# Patient Record
Sex: FEMALE | Race: WHITE | Employment: FULL TIME | URBAN - METROPOLITAN AREA
[De-identification: names, ages, dates, MRNs, and addresses within clinical notes are randomized per-mention and may not be internally consistent; named-entity substitution may affect disease eponyms.]

---

## 2017-01-09 ENCOUNTER — OFFICE VISIT (OUTPATIENT)
Dept: FAMILY MEDICINE CLINIC | Age: 52
End: 2017-01-09

## 2017-01-09 VITALS
HEIGHT: 60 IN | WEIGHT: 137.6 LBS | OXYGEN SATURATION: 96 % | SYSTOLIC BLOOD PRESSURE: 120 MMHG | BODY MASS INDEX: 27.01 KG/M2 | DIASTOLIC BLOOD PRESSURE: 80 MMHG | TEMPERATURE: 97.7 F | HEART RATE: 74 BPM

## 2017-01-09 DIAGNOSIS — J45.21 MILD INTERMITTENT ASTHMA WITH ACUTE EXACERBATION: Primary | ICD-10-CM

## 2017-01-09 PROCEDURE — 99213 OFFICE O/P EST LOW 20 MIN: CPT | Performed by: NURSE PRACTITIONER

## 2017-01-09 PROCEDURE — 94150 VITAL CAPACITY TEST: CPT | Performed by: NURSE PRACTITIONER

## 2017-01-09 PROCEDURE — 94640 AIRWAY INHALATION TREATMENT: CPT | Performed by: NURSE PRACTITIONER

## 2017-01-09 RX ORDER — ALBUTEROL SULFATE 90 UG/1
2 AEROSOL, METERED RESPIRATORY (INHALATION) EVERY 6 HOURS PRN
Qty: 3 INHALER | Refills: 1 | Status: SHIPPED | OUTPATIENT
Start: 2017-01-09 | End: 2019-09-09

## 2017-01-09 RX ORDER — MONTELUKAST SODIUM 10 MG/1
10 TABLET ORAL DAILY
Qty: 90 TABLET | Refills: 1 | Status: SHIPPED | OUTPATIENT
Start: 2017-01-09 | End: 2017-06-20 | Stop reason: SDUPTHER

## 2017-01-09 RX ORDER — PREDNISONE 10 MG/1
TABLET ORAL
Qty: 20 TABLET | Refills: 0 | Status: SHIPPED | OUTPATIENT
Start: 2017-01-09 | End: 2017-08-15 | Stop reason: ALTCHOICE

## 2017-01-09 RX ORDER — ALBUTEROL SULFATE 2.5 MG/3ML
2.5 SOLUTION RESPIRATORY (INHALATION) ONCE
Status: COMPLETED | OUTPATIENT
Start: 2017-01-09 | End: 2017-01-09

## 2017-01-09 RX ADMIN — ALBUTEROL SULFATE 2.5 MG: 2.5 SOLUTION RESPIRATORY (INHALATION) at 16:16

## 2017-01-09 ASSESSMENT — ENCOUNTER SYMPTOMS
WHEEZING: 0
HEARTBURN: 0
CHEST TIGHTNESS: 1
COUGH: 1
EYES NEGATIVE: 1
GASTROINTESTINAL NEGATIVE: 1
ALLERGIC/IMMUNOLOGIC NEGATIVE: 1
HEMOPTYSIS: 0
SORE THROAT: 0
SHORTNESS OF BREATH: 0
RHINORRHEA: 0

## 2017-04-20 RX ORDER — PAROXETINE 10 MG/1
10 TABLET, FILM COATED ORAL EVERY MORNING
Qty: 90 TABLET | Refills: 0 | Status: SHIPPED | OUTPATIENT
Start: 2017-04-20 | End: 2017-07-19 | Stop reason: SDUPTHER

## 2017-05-08 DIAGNOSIS — E03.9 ACQUIRED HYPOTHYROIDISM: ICD-10-CM

## 2017-05-08 RX ORDER — LEVOTHYROXINE SODIUM 0.1 MG/1
100 TABLET ORAL DAILY
Qty: 90 TABLET | Refills: 0 | Status: SHIPPED | OUTPATIENT
Start: 2017-05-08 | End: 2017-08-07 | Stop reason: SDUPTHER

## 2017-06-20 DIAGNOSIS — J30.1 SEASONAL ALLERGIC RHINITIS DUE TO POLLEN: Primary | ICD-10-CM

## 2017-06-20 RX ORDER — MONTELUKAST SODIUM 10 MG/1
10 TABLET ORAL NIGHTLY
Qty: 90 TABLET | Refills: 1 | Status: SHIPPED | OUTPATIENT
Start: 2017-06-20 | End: 2017-12-18 | Stop reason: SDUPTHER

## 2017-07-19 RX ORDER — PAROXETINE 10 MG/1
10 TABLET, FILM COATED ORAL EVERY MORNING
Qty: 90 TABLET | Refills: 0 | Status: SHIPPED | OUTPATIENT
Start: 2017-07-19 | End: 2017-10-17 | Stop reason: SDUPTHER

## 2017-07-25 ENCOUNTER — TELEPHONE (OUTPATIENT)
Dept: FAMILY MEDICINE CLINIC | Age: 52
End: 2017-07-25

## 2017-07-25 DIAGNOSIS — Z00.00 ROUTINE GENERAL MEDICAL EXAMINATION AT A HEALTH CARE FACILITY: Primary | ICD-10-CM

## 2017-07-25 DIAGNOSIS — Z11.59 NEED FOR HEPATITIS C SCREENING TEST: ICD-10-CM

## 2017-07-25 DIAGNOSIS — Z11.4 SCREENING FOR HIV (HUMAN IMMUNODEFICIENCY VIRUS): ICD-10-CM

## 2017-08-07 DIAGNOSIS — E03.9 ACQUIRED HYPOTHYROIDISM: ICD-10-CM

## 2017-08-07 RX ORDER — LEVOTHYROXINE SODIUM 0.1 MG/1
100 TABLET ORAL DAILY
Qty: 90 TABLET | Refills: 1 | Status: SHIPPED | OUTPATIENT
Start: 2017-08-07 | End: 2018-01-22 | Stop reason: SDUPTHER

## 2017-08-15 ENCOUNTER — OFFICE VISIT (OUTPATIENT)
Dept: FAMILY MEDICINE CLINIC | Age: 52
End: 2017-08-15

## 2017-08-15 VITALS
HEART RATE: 64 BPM | HEIGHT: 60 IN | BODY MASS INDEX: 27.84 KG/M2 | SYSTOLIC BLOOD PRESSURE: 100 MMHG | OXYGEN SATURATION: 96 % | DIASTOLIC BLOOD PRESSURE: 60 MMHG | WEIGHT: 141.8 LBS | TEMPERATURE: 98 F

## 2017-08-15 DIAGNOSIS — Z00.00 ROUTINE GENERAL MEDICAL EXAMINATION AT A HEALTH CARE FACILITY: Primary | ICD-10-CM

## 2017-08-15 LAB
BILIRUBIN, POC: NORMAL
BLOOD URINE, POC: NORMAL
CLARITY, POC: CLEAR
COLOR, POC: YELLOW
GLUCOSE URINE, POC: NORMAL
KETONES, POC: NORMAL
LEUKOCYTE EST, POC: NORMAL
NITRITE, POC: NORMAL
PH, POC: 5
PROTEIN, POC: NORMAL
SPECIFIC GRAVITY, POC: 1.02
UROBILINOGEN, POC: 0.2

## 2017-08-15 PROCEDURE — 99396 PREV VISIT EST AGE 40-64: CPT | Performed by: NURSE PRACTITIONER

## 2017-08-15 PROCEDURE — 81002 URINALYSIS NONAUTO W/O SCOPE: CPT | Performed by: NURSE PRACTITIONER

## 2017-10-17 RX ORDER — PAROXETINE 10 MG/1
10 TABLET, FILM COATED ORAL EVERY MORNING
Qty: 90 TABLET | Refills: 1 | Status: SHIPPED | OUTPATIENT
Start: 2017-10-17 | End: 2018-10-26 | Stop reason: SDUPTHER

## 2017-12-07 ENCOUNTER — TELEPHONE (OUTPATIENT)
Dept: OTHER | Facility: CLINIC | Age: 52
End: 2017-12-07

## 2017-12-18 DIAGNOSIS — J30.1 SEASONAL ALLERGIC RHINITIS DUE TO POLLEN: ICD-10-CM

## 2017-12-18 RX ORDER — MONTELUKAST SODIUM 10 MG/1
10 TABLET ORAL NIGHTLY
Qty: 90 TABLET | Refills: 1 | Status: SHIPPED | OUTPATIENT
Start: 2017-12-18 | End: 2019-09-09

## 2018-01-22 DIAGNOSIS — E03.9 ACQUIRED HYPOTHYROIDISM: ICD-10-CM

## 2018-01-22 RX ORDER — LEVOTHYROXINE SODIUM 0.1 MG/1
100 TABLET ORAL DAILY
Qty: 90 TABLET | Refills: 1 | Status: SHIPPED | OUTPATIENT
Start: 2018-01-22 | End: 2018-07-21 | Stop reason: SDUPTHER

## 2018-07-21 DIAGNOSIS — E03.9 ACQUIRED HYPOTHYROIDISM: ICD-10-CM

## 2018-07-21 RX ORDER — LEVOTHYROXINE SODIUM 0.1 MG/1
TABLET ORAL
Qty: 90 TABLET | Refills: 0 | Status: SHIPPED | OUTPATIENT
Start: 2018-07-21 | End: 2019-02-06 | Stop reason: SDUPTHER

## 2018-07-26 ENCOUNTER — OFFICE VISIT (OUTPATIENT)
Dept: FAMILY MEDICINE CLINIC | Age: 53
End: 2018-07-26

## 2018-07-26 ENCOUNTER — HOSPITAL ENCOUNTER (OUTPATIENT)
Age: 53
Discharge: HOME | End: 2018-07-26
Payer: COMMERCIAL

## 2018-07-26 ENCOUNTER — HOSPITAL ENCOUNTER (OUTPATIENT)
Dept: GENERAL RADIOLOGY | Age: 53
Discharge: HOME | End: 2018-07-26
Payer: COMMERCIAL

## 2018-07-26 VITALS
WEIGHT: 143.6 LBS | BODY MASS INDEX: 28.04 KG/M2 | DIASTOLIC BLOOD PRESSURE: 86 MMHG | SYSTOLIC BLOOD PRESSURE: 124 MMHG | TEMPERATURE: 98.3 F

## 2018-07-26 DIAGNOSIS — E03.9 ACQUIRED HYPOTHYROIDISM: ICD-10-CM

## 2018-07-26 DIAGNOSIS — G89.29 CHRONIC RIGHT-SIDED LOW BACK PAIN WITHOUT SCIATICA: Primary | ICD-10-CM

## 2018-07-26 DIAGNOSIS — S76.019A HIP STRAIN, UNSPECIFIED LATERALITY, INITIAL ENCOUNTER: ICD-10-CM

## 2018-07-26 DIAGNOSIS — G89.29 CHRONIC RIGHT-SIDED LOW BACK PAIN WITHOUT SCIATICA: ICD-10-CM

## 2018-07-26 DIAGNOSIS — N95.1 MENOPAUSE SYNDROME: ICD-10-CM

## 2018-07-26 DIAGNOSIS — J30.1 CHRONIC SEASONAL ALLERGIC RHINITIS DUE TO POLLEN: ICD-10-CM

## 2018-07-26 DIAGNOSIS — M54.50 CHRONIC RIGHT-SIDED LOW BACK PAIN WITHOUT SCIATICA: ICD-10-CM

## 2018-07-26 DIAGNOSIS — M54.50 CHRONIC RIGHT-SIDED LOW BACK PAIN WITHOUT SCIATICA: Primary | ICD-10-CM

## 2018-07-26 LAB
T4 FREE: 1.4 NG/DL (ref 0.9–1.8)
TSH SERPL DL<=0.05 MIU/L-ACNC: 2.4 UIU/ML (ref 0.27–4.2)

## 2018-07-26 PROCEDURE — 72100 X-RAY EXAM L-S SPINE 2/3 VWS: CPT

## 2018-07-26 PROCEDURE — 99214 OFFICE O/P EST MOD 30 MIN: CPT | Performed by: FAMILY MEDICINE

## 2018-07-26 RX ORDER — CIPROFLOXACIN 500 MG/1
TABLET, FILM COATED ORAL
COMMUNITY
Start: 2018-07-23 | End: 2018-12-27

## 2018-07-27 ASSESSMENT — PATIENT HEALTH QUESTIONNAIRE - PHQ9
SUM OF ALL RESPONSES TO PHQ QUESTIONS 1-9: 2
SUM OF ALL RESPONSES TO PHQ9 QUESTIONS 1 & 2: 2
2. FEELING DOWN, DEPRESSED OR HOPELESS: 1
1. LITTLE INTEREST OR PLEASURE IN DOING THINGS: 1

## 2018-07-27 ASSESSMENT — ENCOUNTER SYMPTOMS
GASTROINTESTINAL NEGATIVE: 1
RESPIRATORY NEGATIVE: 1
EYES NEGATIVE: 1

## 2018-07-27 NOTE — PROGRESS NOTES
7/27/18    Linda Hays  1965      Chief Complaint   Patient presents with    Back Pain     lower back pain radiating into hip and leg, no injury, consant, dull ache, worse when working out     Low Back Pain: Patient complains of chronic low back pain. This is evaluated as a personal injury. The patient first noted symptoms severalmonths ago. It was related to no known injury. The pain is rated mild, moderate, and is located at the right lumbar area, right sacroiliac area or radiating to right leg(s). The pain is described as aching and occurs intermittently. The symptoms denies been progressive. Symptoms are exacerbated by sitting, standing, twisting and lying down. Factors which relieve the pain include change in body position and NSAIDs (otc). Other associated symptoms include none. Previous history of symptoms: never. Treatment efforts have included OTC NSAIDS, with and without relief. Hypothyroidism: Patient presents for evaluation of thyroid function. Symptoms consist of fatigue. Symptoms have present for several years. The symptoms are mild. The problem has been controlled. Previous thyroid studies include TSH. The hypothyroidism is due to hypothyroidism. Allergic Rhinitis: Linda Hays is here for evaluation of possible allergic rhinitis. Patient's symptoms include clear rhinorrhea, nasal congestion and postnasal drip. These symptoms are perennial with seasonal exacerbation. Current triggers include exposure to pollens. The patient has been suffering from these symptoms for approximately 12 years. The patient has tried over the counter medications with good relief of symptoms. Immunotherapy has never been tried. The patient has never had nasal polyps. The patient has no history of asthma. The patient does not suffer from frequent sinopulmonary infections. The patient has not had sinus surgery in the past. The patient has no history of eczema.     /86   Temp 98.3 °F (36.8 °C) (Oral)   Wt 143 lb 9.6 oz (65.1 kg)   BMI 28.04 kg/m²       Immunization History   Administered Date(s) Administered    Influenza, Quadv, 3 yrs and older, IM, Preservative Free 10/13/2016    Tdap (Boostrix, Adacel) 09/12/2014       Allergies   Allergen Reactions    Wasp Venom Other (See Comments)     Outpatient Prescriptions Marked as Taking for the 7/26/18 encounter (Office Visit) with Nanette Rosa MD   Medication Sig Dispense Refill    ciprofloxacin (CIPRO) 500 MG tablet       levothyroxine (SYNTHROID) 100 MCG tablet TAKE 1 TABLET DAILY 90 tablet 0    montelukast (SINGULAIR) 10 MG tablet Take 1 tablet by mouth nightly 90 tablet 1    PARoxetine (PAXIL) 10 MG tablet Take 1 tablet by mouth every morning 90 tablet 1    budesonide-formoterol (SYMBICORT) 160-4.5 MCG/ACT AERO Inhale 2 puffs into the lungs 2 times daily. 1 Inhaler 3       Past Medical History:   Diagnosis Date    Allergic rhinitis     Asthma     Hyperlipidemia     Hypothyroidism      No past surgical history on file. No family history on file. Social History     Social History    Marital status:      Spouse name: N/A    Number of children: N/A    Years of education: N/A     Occupational History    Not on file. Social History Main Topics    Smoking status: Passive Smoke Exposure - Never Smoker     Types: Cigarettes    Smokeless tobacco: Never Used    Alcohol use 0.0 oz/week    Drug use: No    Sexual activity: Yes     Partners: Male     Other Topics Concern    Not on file     Social History Narrative    No narrative on file         Any recent diagnostic tests, hospital reports, office notes or consultation letters were reviewed prior to and during the visit. Review of Systems   Constitutional: Negative. HENT: Negative. Eyes: Negative. Respiratory: Negative. Cardiovascular: Negative. Gastrointestinal: Negative. Genitourinary: Negative. Musculoskeletal: Negative.     Psychiatric/Behavioral: Negative. Physical Exam   Constitutional: She is oriented to person, place, and time. She appears well-developed and well-nourished. No distress. HENT:   Head: Normocephalic and atraumatic. Right Ear: Hearing, tympanic membrane, external ear and ear canal normal.   Left Ear: Hearing, tympanic membrane, external ear and ear canal normal.   Nose: Nose normal. No mucosal edema or rhinorrhea. Mouth/Throat: Uvula is midline, oropharynx is clear and moist and mucous membranes are normal.   Eyes: Conjunctivae and EOM are normal. Pupils are equal, round, and reactive to light. Right eye exhibits no discharge. Left eye exhibits no discharge. No scleral icterus. Neck: Trachea normal and normal range of motion. Neck supple. No JVD present. No tracheal deviation present. No thyromegaly present. Cardiovascular: Normal rate, regular rhythm, normal heart sounds and intact distal pulses. Exam reveals no gallop and no friction rub. No murmur heard. Pulses:       Dorsalis pedis pulses are 2+ on the right side, and 2+ on the left side. Posterior tibial pulses are 2+ on the right side, and 2+ on the left side. Pulmonary/Chest: Effort normal and breath sounds normal. No stridor. No respiratory distress. She has no decreased breath sounds. She has no wheezes. She has no rhonchi. She has no rales. She exhibits no tenderness. Abdominal: Soft. Bowel sounds are normal. She exhibits no distension and no mass. There is no hepatosplenomegaly. There is no tenderness. There is no rebound and no guarding. No hernia. Musculoskeletal:        Right hip: She exhibits decreased range of motion, decreased strength and tenderness. She exhibits no bony tenderness. Left hip: She exhibits normal range of motion, normal strength, no tenderness and no bony tenderness. Lumbar back: She exhibits decreased range of motion, tenderness and bony tenderness.  She exhibits no swelling, no deformity, no pain and no

## 2018-08-30 ENCOUNTER — OFFICE VISIT (OUTPATIENT)
Dept: INTERNAL MEDICINE | Age: 53
End: 2018-08-30

## 2018-08-30 ENCOUNTER — HOSPITAL ENCOUNTER (OUTPATIENT)
Dept: MAMMOGRAPHY | Age: 53
Discharge: HOME | End: 2018-08-30
Payer: COMMERCIAL

## 2018-08-30 VITALS
HEIGHT: 60 IN | SYSTOLIC BLOOD PRESSURE: 120 MMHG | DIASTOLIC BLOOD PRESSURE: 80 MMHG | HEART RATE: 68 BPM | WEIGHT: 144 LBS | BODY MASS INDEX: 28.27 KG/M2 | OXYGEN SATURATION: 96 %

## 2018-08-30 DIAGNOSIS — Z12.31 ENCOUNTER FOR SCREENING MAMMOGRAM FOR BREAST CANCER: ICD-10-CM

## 2018-08-30 DIAGNOSIS — L21.9 DERMATITIS SEBORRHEICA: ICD-10-CM

## 2018-08-30 DIAGNOSIS — L40.9 PSORIASIS: ICD-10-CM

## 2018-08-30 DIAGNOSIS — Z00.00 ROUTINE GENERAL MEDICAL EXAMINATION AT A HEALTH CARE FACILITY: Primary | ICD-10-CM

## 2018-08-30 PROCEDURE — 77067 SCR MAMMO BI INCL CAD: CPT

## 2018-08-30 PROCEDURE — 99396 PREV VISIT EST AGE 40-64: CPT | Performed by: NURSE PRACTITIONER

## 2018-08-30 RX ORDER — CLOBETASOL PROPIONATE 0.5 MG/G
OINTMENT TOPICAL
Qty: 60 G | Refills: 3 | Status: SHIPPED | OUTPATIENT
Start: 2018-08-30 | End: 2019-09-09

## 2018-08-30 RX ORDER — MOMETASONE FUROATE 1 MG/G
CREAM TOPICAL
Qty: 50 G | Refills: 3 | Status: ON HOLD | OUTPATIENT
Start: 2018-08-30 | End: 2019-09-11

## 2018-08-30 ASSESSMENT — PATIENT HEALTH QUESTIONNAIRE - PHQ9
SUM OF ALL RESPONSES TO PHQ9 QUESTIONS 1 & 2: 0
2. FEELING DOWN, DEPRESSED OR HOPELESS: 0
SUM OF ALL RESPONSES TO PHQ QUESTIONS 1-9: 0
1. LITTLE INTEREST OR PLEASURE IN DOING THINGS: 0
SUM OF ALL RESPONSES TO PHQ QUESTIONS 1-9: 0

## 2018-08-30 ASSESSMENT — ENCOUNTER SYMPTOMS
ROS SKIN COMMENTS: PSORIASIS
ABDOMINAL PAIN: 0
CONSTIPATION: 0
RHINORRHEA: 0
EYE REDNESS: 0
BLOOD IN STOOL: 0
EYE ITCHING: 0
WHEEZING: 0
NAUSEA: 0
SORE THROAT: 0
COUGH: 0
SHORTNESS OF BREATH: 0
SINUS PRESSURE: 0
COLOR CHANGE: 0
CHEST TIGHTNESS: 0
VOMITING: 0
BACK PAIN: 0
DIARRHEA: 0

## 2018-08-30 NOTE — PROGRESS NOTES
Position: Sitting   Cuff Size: Medium Adult   Pulse: 68   SpO2: 96%   Weight: 144 lb (65.3 kg)   Height: 5' (1.524 m)     Estimated body mass index is 28.12 kg/m² as calculated from the following:    Height as of this encounter: 5' (1.524 m). Weight as of this encounter: 144 lb (65.3 kg). Physical Exam   Constitutional: She appears well-developed and well-nourished. HENT:   Right Ear: Hearing, tympanic membrane, external ear and ear canal normal.   Left Ear: Hearing, tympanic membrane, external ear and ear canal normal.   Nose: No mucosal edema or rhinorrhea. Right sinus exhibits no maxillary sinus tenderness and no frontal sinus tenderness. Left sinus exhibits no maxillary sinus tenderness and no frontal sinus tenderness. Mouth/Throat: No oropharyngeal exudate, posterior oropharyngeal edema, posterior oropharyngeal erythema or tonsillar abscesses. Cardiovascular: Normal rate, regular rhythm and normal heart sounds. Pulmonary/Chest: Effort normal and breath sounds normal.   Lymphadenopathy:        Head (right side): No submental, no submandibular, no tonsillar, no preauricular, no posterior auricular and no occipital adenopathy present. Head (left side): No submental, no submandibular, no tonsillar, no preauricular, no posterior auricular and no occipital adenopathy present. She has no cervical adenopathy. Skin: Skin is warm and dry. Rash noted. seborrheic dermatitis, psoriasis       No flowsheet data found.     Lab Results   Component Value Date    CHOL 166 07/29/2017    CHOL 159 08/27/2016    CHOL 152 08/29/2015    TRIG 105 07/29/2017    TRIG 90 08/27/2016    TRIG 84 08/29/2015    HDL 60 07/29/2017    HDL 53 08/27/2016    HDL 61 08/29/2015    HDL 64 10/24/2011    LDLCALC 85 07/29/2017    LDLCALC 88 08/27/2016    LDLCALC 74 08/29/2015    GLUCOSE 92 07/29/2017    LABA1C 5.4 07/29/2017       The 10-year ASCVD risk score (Marilin Ovalle et al., 2013) is: 0.9%    Values used to calculate the score:      Age: 46 years      Sex: Female      Is Non- : No      Diabetic: No      Tobacco smoker: No      Systolic Blood Pressure: 675 mmHg      Is BP treated: No      HDL Cholesterol: 60 mg/dL      Total Cholesterol: 166 mg/dL    Immunization History   Administered Date(s) Administered    Influenza, Quadv, 3 yrs and older, IM, PF (Fluzone 3 yrs and older or Afluria 5 yrs and older) 10/13/2016    Pneumococcal Polysaccharide ([id number]) 10/23/2015    Tdap (Boostrix, Adacel) 09/12/2014       Health Maintenance   Topic Date Due    Cervical cancer screen  09/24/2013    Shingles Vaccine (1 of 2 - 2 Dose Series) 10/24/2015    Breast cancer screen  09/12/2016    Flu vaccine (1) 09/01/2018    TSH testing  07/26/2019    Lipid screen  07/29/2022    DTaP/Tdap/Td vaccine (2 - Td) 09/12/2024    Colon cancer screen colonoscopy  08/29/2026    Pneumococcal med risk  Completed    Hepatitis C screen  Completed    HIV screen  Completed       ASSESSMENT/PLAN:  1. Routine general medical examination at a health care facility    - Basic Metabolic Panel; Future  - CBC; Future  - Hemoglobin A1C; Future  - Lipid, Fasting; Future  - T4, Free; Future  - TSH with Reflex; Future  - Vitamin D 1,25 Dihydroxy; Future    2. Encounter for screening mammogram for breast cancer    - [facility] Digital Screen Bilateral [[id number]]; Future    3. Dermatitis seborrheica    - mometasone (ELOCON) 0.1 % cream; Apply topically daily. Dispense: 50 g; Refill: 3    4. Psoriasis    - clobetasol (TEMOVATE) 0.05 % ointment; Apply topically 2 times daily. Dispense: 60 g; Refill: 3      No Follow-up on file. An electronic signature was used to authenticate this note.     --REGINA Angela - CNP on 8/30/2018 at 2:39 PM

## 2018-09-01 DIAGNOSIS — Z00.00 ROUTINE GENERAL MEDICAL EXAMINATION AT A HEALTH CARE FACILITY: ICD-10-CM

## 2018-09-01 LAB
ANION GAP SERPL CALCULATED.3IONS-SCNC: 12 MMOL/L (ref 3–16)
BUN BLDV-MCNC: 11 MG/DL (ref 7–20)
CALCIUM SERPL-MCNC: 9.5 MG/DL (ref 8.3–10.6)
CHLORIDE BLD-SCNC: 102 MMOL/L (ref 99–110)
CHOLESTEROL, FASTING: 168 MG/DL (ref 0–199)
CO2: 27 MMOL/L (ref 21–32)
CREAT SERPL-MCNC: 0.7 MG/DL (ref 0.6–1.1)
GFR AFRICAN AMERICAN: >60
GFR NON-AFRICAN AMERICAN: >60
GLUCOSE BLD-MCNC: 90 MG/DL (ref 70–99)
HCT VFR BLD CALC: 42.6 % (ref 36–48)
HDLC SERPL-MCNC: 50 MG/DL (ref 40–60)
HEMOGLOBIN: 14.1 G/DL (ref 12–16)
LDL CHOLESTEROL CALCULATED: 88 MG/DL
MCH RBC QN AUTO: 30.3 PG (ref 26–34)
MCHC RBC AUTO-ENTMCNC: 33.2 G/DL (ref 31–36)
MCV RBC AUTO: 91.2 FL (ref 80–100)
PDW BLD-RTO: 13.3 % (ref 12.4–15.4)
PLATELET # BLD: 286 K/UL (ref 135–450)
PMV BLD AUTO: 8.6 FL (ref 5–10.5)
POTASSIUM SERPL-SCNC: 4.3 MMOL/L (ref 3.5–5.1)
RBC # BLD: 4.67 M/UL (ref 4–5.2)
SODIUM BLD-SCNC: 141 MMOL/L (ref 136–145)
T4 FREE: 1.3 NG/DL (ref 0.9–1.8)
TRIGLYCERIDE, FASTING: 152 MG/DL (ref 0–150)
TSH REFLEX: 1.71 UIU/ML (ref 0.27–4.2)
VLDLC SERPL CALC-MCNC: 30 MG/DL
WBC # BLD: 4.8 K/UL (ref 4–11)

## 2018-09-02 LAB
ESTIMATED AVERAGE GLUCOSE: 108.3 MG/DL
HBA1C MFR BLD: 5.4 %

## 2018-09-03 LAB — VITAMIN D 1,25-DIHYDROXY: 47.4 PG/ML (ref 19.9–79.3)

## 2018-09-05 ENCOUNTER — TELEPHONE (OUTPATIENT)
Dept: INTERNAL MEDICINE | Age: 53
End: 2018-09-05

## 2018-10-26 ENCOUNTER — TELEPHONE (OUTPATIENT)
Dept: INTERNAL MEDICINE CLINIC | Age: 53
End: 2018-10-26

## 2018-10-26 RX ORDER — PAROXETINE 10 MG/1
10 TABLET, FILM COATED ORAL EVERY MORNING
Qty: 90 TABLET | Refills: 1 | Status: SHIPPED | OUTPATIENT
Start: 2018-10-26 | End: 2018-10-28 | Stop reason: SDUPTHER

## 2018-10-28 RX ORDER — PAROXETINE 10 MG/1
10 TABLET, FILM COATED ORAL EVERY MORNING
Qty: 90 TABLET | Refills: 1 | Status: SHIPPED | OUTPATIENT
Start: 2018-10-28 | End: 2019-02-06 | Stop reason: SDUPTHER

## 2018-12-27 ENCOUNTER — TELEPHONE (OUTPATIENT)
Dept: INTERNAL MEDICINE CLINIC | Age: 53
End: 2018-12-27

## 2018-12-27 ENCOUNTER — OFFICE VISIT (OUTPATIENT)
Dept: INTERNAL MEDICINE CLINIC | Age: 53
End: 2018-12-27
Payer: COMMERCIAL

## 2018-12-27 VITALS
BODY MASS INDEX: 28.37 KG/M2 | HEART RATE: 80 BPM | WEIGHT: 144.5 LBS | OXYGEN SATURATION: 92 % | SYSTOLIC BLOOD PRESSURE: 124 MMHG | HEIGHT: 60 IN | DIASTOLIC BLOOD PRESSURE: 70 MMHG

## 2018-12-27 DIAGNOSIS — J32.4 CHRONIC PANSINUSITIS: ICD-10-CM

## 2018-12-27 DIAGNOSIS — R06.2 WHEEZING: Primary | ICD-10-CM

## 2018-12-27 DIAGNOSIS — J40 BRONCHITIS: ICD-10-CM

## 2018-12-27 PROCEDURE — 99213 OFFICE O/P EST LOW 20 MIN: CPT | Performed by: INTERNAL MEDICINE

## 2018-12-27 PROCEDURE — 96372 THER/PROPH/DIAG INJ SC/IM: CPT | Performed by: INTERNAL MEDICINE

## 2018-12-27 PROCEDURE — 94640 AIRWAY INHALATION TREATMENT: CPT | Performed by: INTERNAL MEDICINE

## 2018-12-27 RX ORDER — PREDNISONE 10 MG/1
TABLET ORAL
Qty: 30 TABLET | Refills: 0 | Status: SHIPPED | OUTPATIENT
Start: 2018-12-28 | End: 2019-01-06

## 2018-12-27 RX ORDER — METHYLPREDNISOLONE ACETATE 80 MG/ML
80 INJECTION, SUSPENSION INTRA-ARTICULAR; INTRALESIONAL; INTRAMUSCULAR; SOFT TISSUE ONCE
Status: COMPLETED | OUTPATIENT
Start: 2018-12-27 | End: 2018-12-27

## 2018-12-27 RX ORDER — ALBUTEROL SULFATE 2.5 MG/3ML
2.5 SOLUTION RESPIRATORY (INHALATION) ONCE
Status: COMPLETED | OUTPATIENT
Start: 2018-12-27 | End: 2018-12-27

## 2018-12-27 RX ORDER — CEFDINIR 300 MG/1
300 CAPSULE ORAL 2 TIMES DAILY
Qty: 20 CAPSULE | Refills: 0 | Status: SHIPPED | OUTPATIENT
Start: 2018-12-27 | End: 2019-01-06

## 2018-12-27 RX ADMIN — ALBUTEROL SULFATE 2.5 MG: 2.5 SOLUTION RESPIRATORY (INHALATION) at 17:00

## 2018-12-27 RX ADMIN — METHYLPREDNISOLONE ACETATE 80 MG: 80 INJECTION, SUSPENSION INTRA-ARTICULAR; INTRALESIONAL; INTRAMUSCULAR; SOFT TISSUE at 17:00

## 2018-12-27 ASSESSMENT — ENCOUNTER SYMPTOMS
SHORTNESS OF BREATH: 0
VOMITING: 0
SINUS PRESSURE: 1
NAUSEA: 0
FACIAL SWELLING: 0
WHEEZING: 0
CHEST TIGHTNESS: 0
RHINORRHEA: 1
COUGH: 1

## 2018-12-27 ASSESSMENT — PATIENT HEALTH QUESTIONNAIRE - PHQ9
1. LITTLE INTEREST OR PLEASURE IN DOING THINGS: 0
SUM OF ALL RESPONSES TO PHQ QUESTIONS 1-9: 0
SUM OF ALL RESPONSES TO PHQ9 QUESTIONS 1 & 2: 0
2. FEELING DOWN, DEPRESSED OR HOPELESS: 0
SUM OF ALL RESPONSES TO PHQ QUESTIONS 1-9: 0

## 2018-12-27 NOTE — PROGRESS NOTES
Subjective:      Patient ID: Judy Chiang is a 48 y.o. y.o. female. Chief Complaint   Patient presents with    Cough    Chest Congestion    Wheezing       HPI    Patient is here feeling sick for the last 5 days. She has sinus and ear pressure. She has congestion in her chest.  She is coughing up thick colored phlegm. She has no fever or chills. She has intermittent wheezing. Vitals:    12/27/18 1633   BP: 124/70   Pulse: 80   SpO2: 92%       Wt Readings from Last 3 Encounters:   12/27/18 144 lb 8 oz (65.5 kg)   08/30/18 144 lb (65.3 kg)   07/26/18 143 lb 9.6 oz (65.1 kg)           Discussed use, benefit, and side effects of prescribed medications. Barriers to medication compliance addressed. All patient questions answered. Pt voiced understanding. Review of Systems   Constitutional: Negative for activity change, appetite change, chills, diaphoresis and fever. HENT: Positive for congestion, postnasal drip, rhinorrhea, sinus pressure and sneezing. Negative for ear discharge, ear pain, facial swelling and hearing loss. Respiratory: Positive for cough. Negative for chest tightness, shortness of breath and wheezing. Gastrointestinal: Negative for nausea and vomiting. Musculoskeletal: Negative for neck pain. Objective:   Physical Exam   Constitutional: She appears well-developed and well-nourished. HENT:   Head: Normocephalic and atraumatic. Right Ear: Tympanic membrane and ear canal normal.   Left Ear: Tympanic membrane and ear canal normal.   Nose: Mucosal edema and rhinorrhea present. Mouth/Throat: Uvula is midline, oropharynx is clear and moist and mucous membranes are normal.   Cardiovascular: Normal rate, regular rhythm and S1 normal.    Pulmonary/Chest: Effort normal. She has wheezes.        Assessment:      See Problem List assessment and plan       Plan:     Sinusitis, Chronic  Omnicef  Prednisone  Mucinex  Align  Call if no better      Bronchitis  Improved with Albuterol and Depomedrol  Omnicef  Probiotic  Mucinex  Use inhalers  Steroid taper beginning tomorrow        Patient engaged in shared decision making. Information given to evaluateoptions of treatment, understand what is needed and discuss importance of following plan.

## 2019-02-05 ENCOUNTER — PATIENT MESSAGE (OUTPATIENT)
Dept: INTERNAL MEDICINE CLINIC | Age: 54
End: 2019-02-05

## 2019-02-05 DIAGNOSIS — E03.9 ACQUIRED HYPOTHYROIDISM: ICD-10-CM

## 2019-02-06 RX ORDER — PAROXETINE 10 MG/1
10 TABLET, FILM COATED ORAL EVERY MORNING
Qty: 90 TABLET | Refills: 1 | Status: SHIPPED | OUTPATIENT
Start: 2019-02-06 | End: 2019-12-02 | Stop reason: SDUPTHER

## 2019-02-06 RX ORDER — LEVOTHYROXINE SODIUM 0.1 MG/1
TABLET ORAL
Qty: 90 TABLET | Refills: 0 | Status: SHIPPED | OUTPATIENT
Start: 2019-02-06 | End: 2019-05-07 | Stop reason: SDUPTHER

## 2019-03-12 ENCOUNTER — OFFICE VISIT (OUTPATIENT)
Dept: INTERNAL MEDICINE CLINIC | Age: 54
End: 2019-03-12
Payer: COMMERCIAL

## 2019-03-12 VITALS
WEIGHT: 146 LBS | HEART RATE: 67 BPM | SYSTOLIC BLOOD PRESSURE: 122 MMHG | BODY MASS INDEX: 28.66 KG/M2 | HEIGHT: 60 IN | DIASTOLIC BLOOD PRESSURE: 82 MMHG | OXYGEN SATURATION: 97 %

## 2019-03-12 DIAGNOSIS — M75.52 BURSITIS OF LEFT SHOULDER: ICD-10-CM

## 2019-03-12 PROCEDURE — 99213 OFFICE O/P EST LOW 20 MIN: CPT | Performed by: NURSE PRACTITIONER

## 2019-03-12 RX ORDER — METHYLPREDNISOLONE 4 MG/1
TABLET ORAL
Qty: 1 KIT | Refills: 0 | Status: SHIPPED | OUTPATIENT
Start: 2019-03-12 | End: 2019-03-18

## 2019-03-12 ASSESSMENT — PATIENT HEALTH QUESTIONNAIRE - PHQ9
SUM OF ALL RESPONSES TO PHQ9 QUESTIONS 1 & 2: 0
1. LITTLE INTEREST OR PLEASURE IN DOING THINGS: 0
SUM OF ALL RESPONSES TO PHQ QUESTIONS 1-9: 0
2. FEELING DOWN, DEPRESSED OR HOPELESS: 0
SUM OF ALL RESPONSES TO PHQ QUESTIONS 1-9: 0

## 2019-03-12 ASSESSMENT — ENCOUNTER SYMPTOMS
SHORTNESS OF BREATH: 0
COUGH: 0
BACK PAIN: 0
COLOR CHANGE: 0
ABDOMINAL PAIN: 0
VOMITING: 0
DIARRHEA: 0
NAUSEA: 0
WHEEZING: 0

## 2019-05-07 ENCOUNTER — OFFICE VISIT (OUTPATIENT)
Dept: INTERNAL MEDICINE CLINIC | Age: 54
End: 2019-05-07
Payer: COMMERCIAL

## 2019-05-07 VITALS
WEIGHT: 145 LBS | SYSTOLIC BLOOD PRESSURE: 114 MMHG | HEART RATE: 71 BPM | OXYGEN SATURATION: 96 % | DIASTOLIC BLOOD PRESSURE: 72 MMHG | BODY MASS INDEX: 28.32 KG/M2

## 2019-05-07 DIAGNOSIS — M79.89 SWELLING OF BOTH HANDS: Primary | ICD-10-CM

## 2019-05-07 DIAGNOSIS — E03.9 ACQUIRED HYPOTHYROIDISM: ICD-10-CM

## 2019-05-07 DIAGNOSIS — M79.89 SWELLING OF BOTH HANDS: ICD-10-CM

## 2019-05-07 LAB
C-REACTIVE PROTEIN: 7.7 MG/L (ref 0–5.1)
RHEUMATOID FACTOR: <10 IU/ML
SEDIMENTATION RATE, ERYTHROCYTE: 12 MM/HR (ref 0–30)

## 2019-05-07 PROCEDURE — 99213 OFFICE O/P EST LOW 20 MIN: CPT | Performed by: NURSE PRACTITIONER

## 2019-05-07 RX ORDER — LEVOTHYROXINE SODIUM 0.1 MG/1
TABLET ORAL
Qty: 90 TABLET | Refills: 0 | Status: SHIPPED | OUTPATIENT
Start: 2019-05-07 | End: 2019-08-16 | Stop reason: SDUPTHER

## 2019-05-07 ASSESSMENT — ENCOUNTER SYMPTOMS
BACK PAIN: 0
CHEST TIGHTNESS: 0
COLOR CHANGE: 0
ABDOMINAL PAIN: 0
NAUSEA: 0
BLOOD IN STOOL: 0
SINUS PRESSURE: 0
DIARRHEA: 0
WHEEZING: 0
RHINORRHEA: 0
EYE ITCHING: 0
SHORTNESS OF BREATH: 0
VOMITING: 0
COUGH: 0
CONSTIPATION: 0
SORE THROAT: 0
EYE REDNESS: 0

## 2019-05-07 NOTE — PROGRESS NOTES
Subjective:      Patient ID: Gregory Chamberlain is a 48 y.o. female. Chief Complaint   Patient presents with    Hand Pain     swelling and pain in her hands. started in the right and now it is in the both    Shoulder Pain     worse at night when she lays on it, she has shooting pain when she moves a certain way       HPI   Pain in the left shoulder for the past month . Diagnosed with bursitis, she was given round of steroids. Which did not help. The shoulder is bothering her at night and laying on it causes a pain in her upper left aspect of her arm and it is keeping her awake. She also reports that 3 weeks ago both hands started to swell. She also has skin peaking. Review of Systems   Constitutional: Negative for chills, fatigue and fever. HENT: Negative for congestion, ear pain, postnasal drip, rhinorrhea, sinus pressure, sneezing and sore throat. Eyes: Negative for redness and itching. Respiratory: Negative for cough, chest tightness, shortness of breath and wheezing. Cardiovascular: Negative for chest pain and palpitations. Gastrointestinal: Negative for abdominal pain, blood in stool, constipation, diarrhea, nausea and vomiting. Endocrine: Negative for cold intolerance and heat intolerance. Genitourinary: Negative for difficulty urinating, dysuria, flank pain, frequency, hematuria and urgency. Musculoskeletal: Positive for arthralgias and joint swelling (bilateral hands, left shoulder). Negative for back pain and myalgias. Skin: Negative for color change, pallor, rash and wound. Allergic/Immunologic: Negative for environmental allergies and food allergies. Neurological: Negative for dizziness, seizures, syncope, weakness, light-headedness, numbness and headaches. Hematological: Negative for adenopathy. Does not bruise/bleed easily. Psychiatric/Behavioral: Negative for confusion, sleep disturbance and suicidal ideas. The patient is not nervous/anxious and is not hyperactive.

## 2019-05-08 ENCOUNTER — TELEPHONE (OUTPATIENT)
Dept: INTERNAL MEDICINE CLINIC | Age: 54
End: 2019-05-08

## 2019-05-10 RX ORDER — MELOXICAM 15 MG/1
15 TABLET ORAL DAILY
Qty: 30 TABLET | Refills: 3 | Status: SHIPPED | OUTPATIENT
Start: 2019-05-10 | End: 2019-09-09

## 2019-06-03 DIAGNOSIS — M25.50 ARTHRALGIA, UNSPECIFIED JOINT: Primary | ICD-10-CM

## 2019-06-12 ENCOUNTER — OFFICE VISIT (OUTPATIENT)
Dept: RHEUMATOLOGY | Age: 54
End: 2019-06-12
Payer: COMMERCIAL

## 2019-06-12 VITALS
BODY MASS INDEX: 28.86 KG/M2 | WEIGHT: 147 LBS | DIASTOLIC BLOOD PRESSURE: 74 MMHG | SYSTOLIC BLOOD PRESSURE: 108 MMHG | HEIGHT: 60 IN

## 2019-06-12 DIAGNOSIS — M25.50 POLYARTHRALGIA: ICD-10-CM

## 2019-06-12 DIAGNOSIS — R20.2 PARESTHESIA OF BOTH HANDS: Primary | ICD-10-CM

## 2019-06-12 DIAGNOSIS — Z13.828: ICD-10-CM

## 2019-06-12 LAB
A/G RATIO: 1.6 (ref 1.1–2.2)
ALBUMIN SERPL-MCNC: 4.1 G/DL (ref 3.4–5)
ALP BLD-CCNC: 82 U/L (ref 40–129)
ALT SERPL-CCNC: 96 U/L (ref 10–40)
ANION GAP SERPL CALCULATED.3IONS-SCNC: 11 MMOL/L (ref 3–16)
AST SERPL-CCNC: 98 U/L (ref 15–37)
BASOPHILS ABSOLUTE: 0 K/UL (ref 0–0.2)
BASOPHILS RELATIVE PERCENT: 0.4 %
BILIRUB SERPL-MCNC: 0.4 MG/DL (ref 0–1)
BILIRUBIN URINE: NEGATIVE
BLOOD, URINE: NEGATIVE
BUN BLDV-MCNC: 17 MG/DL (ref 7–20)
C-REACTIVE PROTEIN: 7.9 MG/L (ref 0–5.1)
CALCIUM SERPL-MCNC: 9.5 MG/DL (ref 8.3–10.6)
CHLORIDE BLD-SCNC: 101 MMOL/L (ref 99–110)
CLARITY: CLEAR
CO2: 27 MMOL/L (ref 21–32)
COLOR: YELLOW
CREAT SERPL-MCNC: 0.6 MG/DL (ref 0.6–1.1)
EOSINOPHILS ABSOLUTE: 0.4 K/UL (ref 0–0.6)
EOSINOPHILS RELATIVE PERCENT: 6.3 %
GFR AFRICAN AMERICAN: >60
GFR NON-AFRICAN AMERICAN: >60
GLOBULIN: 2.6 G/DL
GLUCOSE BLD-MCNC: 91 MG/DL (ref 70–99)
GLUCOSE URINE: NEGATIVE MG/DL
HBV SURFACE AB TITR SER: <3.5 MIU/ML
HCT VFR BLD CALC: 42.1 % (ref 36–48)
HEMOGLOBIN: 14.1 G/DL (ref 12–16)
HEPATITIS B CORE IGM ANTIBODY: NORMAL
HEPATITIS B SURFACE ANTIGEN INTERPRETATION: NORMAL
HEPATITIS C ANTIBODY INTERPRETATION: NORMAL
KETONES, URINE: NEGATIVE MG/DL
LEUKOCYTE ESTERASE, URINE: NEGATIVE
LYMPHOCYTES ABSOLUTE: 1.5 K/UL (ref 1–5.1)
LYMPHOCYTES RELATIVE PERCENT: 21.3 %
MCH RBC QN AUTO: 30.5 PG (ref 26–34)
MCHC RBC AUTO-ENTMCNC: 33.6 G/DL (ref 31–36)
MCV RBC AUTO: 91 FL (ref 80–100)
MICROSCOPIC EXAMINATION: NORMAL
MONOCYTES ABSOLUTE: 0.6 K/UL (ref 0–1.3)
MONOCYTES RELATIVE PERCENT: 9.1 %
NEUTROPHILS ABSOLUTE: 4.3 K/UL (ref 1.7–7.7)
NEUTROPHILS RELATIVE PERCENT: 62.9 %
NITRITE, URINE: NEGATIVE
PDW BLD-RTO: 13.7 % (ref 12.4–15.4)
PH UA: 7 (ref 5–8)
PLATELET # BLD: 309 K/UL (ref 135–450)
PMV BLD AUTO: 8.7 FL (ref 5–10.5)
POTASSIUM SERPL-SCNC: 4.8 MMOL/L (ref 3.5–5.1)
PROTEIN UA: NEGATIVE MG/DL
RBC # BLD: 4.63 M/UL (ref 4–5.2)
SEDIMENTATION RATE, ERYTHROCYTE: 10 MM/HR (ref 0–30)
SODIUM BLD-SCNC: 139 MMOL/L (ref 136–145)
SPECIFIC GRAVITY UA: 1.01 (ref 1–1.03)
TOTAL CK: 1626 U/L (ref 26–192)
TOTAL PROTEIN: 6.7 G/DL (ref 6.4–8.2)
URINE TYPE: NORMAL
UROBILINOGEN, URINE: 0.2 E.U./DL
WBC # BLD: 6.8 K/UL (ref 4–11)

## 2019-06-12 PROCEDURE — 99244 OFF/OP CNSLTJ NEW/EST MOD 40: CPT | Performed by: INTERNAL MEDICINE

## 2019-06-13 LAB
ANA INTERPRETATION: ABNORMAL
ANA PATTERN: ABNORMAL
ANA TITER: ABNORMAL
ANTI-CENTROMERE B IGG: <0.2 AI (ref 0–0.9)
ANTI-CHROMATIN IGG: <0.2 AI (ref 0–0.9)
ANTI-DSDNA IGG: <1 IU/ML (ref 0–9)
ANTI-JO1 IGG: >8 AI (ref 0–0.9)
ANTI-NUCLEAR ANTIBODY (ANA): POSITIVE
ANTI-RIBOSOMAL P IGG: <0.2 AI (ref 0–0.9)
ANTI-RNP IGG: 0.2 AI (ref 0–0.9)
ANTI-SCL70 IGG: <0.2 AI (ref 0–0.9)
ANTI-SMITH IGG: <0.2 AI (ref 0–0.9)
ANTI-SMRNP IGG: <0.2 AI (ref 0–0.9)
ANTI-SS-A IGG: 0.2 AI (ref 0–0.9)
ANTI-SS-B IGG: <0.2 AI (ref 0–0.9)
CYCLIC CITRULLINATED PEPTIDE ANTIBODY IGG: <0.5 U/ML (ref 0–2.9)

## 2019-06-14 LAB — ALDOLASE: 48.9 U/L (ref 1.5–8.1)

## 2019-06-27 ENCOUNTER — OFFICE VISIT (OUTPATIENT)
Dept: RHEUMATOLOGY | Age: 54
End: 2019-06-27
Payer: COMMERCIAL

## 2019-06-27 VITALS
DIASTOLIC BLOOD PRESSURE: 78 MMHG | SYSTOLIC BLOOD PRESSURE: 115 MMHG | WEIGHT: 146 LBS | BODY MASS INDEX: 28.66 KG/M2 | HEIGHT: 60 IN

## 2019-06-27 DIAGNOSIS — Z23 NEED FOR VACCINATION: ICD-10-CM

## 2019-06-27 DIAGNOSIS — M33.20 POLYMYOSITIS (HCC): Primary | ICD-10-CM

## 2019-06-27 PROCEDURE — 99215 OFFICE O/P EST HI 40 MIN: CPT | Performed by: INTERNAL MEDICINE

## 2019-06-27 PROCEDURE — 90471 IMMUNIZATION ADMIN: CPT | Performed by: INTERNAL MEDICINE

## 2019-06-27 PROCEDURE — 90746 HEPB VACCINE 3 DOSE ADULT IM: CPT | Performed by: INTERNAL MEDICINE

## 2019-06-27 RX ORDER — AZATHIOPRINE 50 MG/1
TABLET ORAL
Qty: 30 TABLET | Refills: 0 | Status: SHIPPED | OUTPATIENT
Start: 2019-06-27 | End: 2019-09-09

## 2019-06-27 RX ORDER — PREDNISONE 20 MG/1
TABLET ORAL
Qty: 60 TABLET | Refills: 0 | Status: SHIPPED | OUTPATIENT
Start: 2019-06-27 | End: 2019-09-09

## 2019-06-27 NOTE — PROGRESS NOTES
Rx.    Bilateral paresthesias and carpal tunnel distribution-improved with wrist brace. Plan-  Discussed diagnosis, prognosis, treatment plan and monitoring. She is up-to-date with cancer monitoring, is due for mammogram and Pap smear this year, last colonoscopy was 4 years ago, normal.   Will obtain full myositis panel today. Obtain PFTs as well. If abnormal, will plan for high-resolution CT. Therapeutically-  Start with prednisone 40 mg daily with taper. Azathioprine 50 mg a day. Side effects, directions were explained. Recheck labs in 2 weeks. Hepatitis B vaccine was given 1 dose today, will do booster in  next visit. Do not lift weight at this time. Once CPK normalizes, okay to resume exercise. Follow-up in 1 month. Time spent 40 mints, > 20 mints was spent explaining disease, diagnosis, prognosis, monitoring, complications, reviewing various medications side effects and monitoring. Patient indicates understanding and agrees with the management plan. I reviewed patient's history, referral documents and electronic medical records. Copy of consult note is being routed electronically/faxed to referring physician. #######################################################################    Subjective-  Follow up for inflammatory polyarthritis and muscle weakness. History is  unchanged from last visit 2 weeks ago except  Feels that legs are weak and is having trouble from getting up from couch and sitting position parminder in AM. Joints are still sore, swollen, and can not make fist. Hand and feet skin is very dry and peeling. Nail fold are very red and crack. Labs- Elevated CPK, aldolase, sed rate, crp, strongly positive anti SHANDRA. Rf, CCP negatve.     History from 2 weeks ago- [address] problem began around Eliud time 2018- abrupt onset of left shoulder pain, was told to have bursitis, still has pain despite steroid ( helped a little bit), NSAIDs and stretches, then 3 month later noticed pain and meetings of clubs or organizations: Not on file     Relationship status: Not on file    Intimate partner violence:     Fear of current or ex partner: Not on file     Emotionally abused: Not on file     Physically abused: Not on file     Forced sexual activity: Not on file   Other Topics Concern    Not on file   Social History Narrative    Not on file       No family history of autoimmune diseases    Current Outpatient Medications   Medication Sig Dispense Refill    azaTHIOprine (IMURAN) 50 MG tablet Take 1 tab po daily. 30 tablet 0    predniSONE (DELTASONE) 20 MG tablet Take 2 tab po dailyx 2 weeks, then take 1.5 tab po daily. 60 tablet 0    meloxicam (MOBIC) 15 MG tablet Take 1 tablet by mouth daily 30 tablet 3    levothyroxine (SYNTHROID) 100 MCG tablet TAKE 1 TABLET DAILY 90 tablet 0    PARoxetine (PAXIL) 10 MG tablet Take 1 tablet by mouth every morning 90 tablet 1    mometasone (ELOCON) 0.1 % cream Apply topically daily. 50 g 3    clobetasol (TEMOVATE) 0.05 % ointment Apply topically 2 times daily. 60 g 3    montelukast (SINGULAIR) 10 MG tablet Take 1 tablet by mouth nightly 90 tablet 1    budesonide-formoterol (SYMBICORT) 160-4.5 MCG/ACT AERO Inhale 2 puffs into the lungs 2 times daily. 1 Inhaler 3    albuterol sulfate HFA (PROVENTIL HFA) 108 (90 BASE) MCG/ACT inhaler Inhale 2 puffs into the lungs every 6 hours as needed for Wheezing or Shortness of Breath MAY SUBSTITUTE PER INSURANCE 3 Inhaler 1     No current facility-administered medications for this visit. Allergies   Allergen Reactions    Wasp Venom Other (See Comments)       PHYSICAL EXAM:    Vitals:    /78   Ht 5' (1.524 m)   Wt 146 lb (66.2 kg)   LMP  (Approximate)   BMI 28.51 kg/m²   General appearance/ Psychiatric: well nourished, and well groomed, normal judgement, alert, appears stated age and cooperative. MKS:   Sclerodactyly in both hand- all fingers, right worse than left.  Puffiness of all fingers both hand, Rt >

## 2019-07-10 ENCOUNTER — HOSPITAL ENCOUNTER (OUTPATIENT)
Dept: PULMONOLOGY | Age: 54
Discharge: HOME | End: 2019-07-10
Payer: COMMERCIAL

## 2019-07-10 DIAGNOSIS — M33.20 POLYMYOSITIS (HCC): ICD-10-CM

## 2019-07-10 PROCEDURE — 94726 PLETHYSMOGRAPHY LUNG VOLUMES: CPT

## 2019-07-10 PROCEDURE — 94640 AIRWAY INHALATION TREATMENT: CPT

## 2019-07-10 PROCEDURE — 94761 N-INVAS EAR/PLS OXIMETRY MLT: CPT

## 2019-07-10 PROCEDURE — 94729 DIFFUSING CAPACITY: CPT

## 2019-07-10 PROCEDURE — 6360000002 HC RX W HCPCS: Performed by: INTERNAL MEDICINE

## 2019-07-10 PROCEDURE — 94060 EVALUATION OF WHEEZING: CPT

## 2019-07-10 PROCEDURE — 94664 DEMO&/EVAL PT USE INHALER: CPT

## 2019-07-10 RX ORDER — ALBUTEROL SULFATE 2.5 MG/3ML
2.5 SOLUTION RESPIRATORY (INHALATION) ONCE
Status: COMPLETED | OUTPATIENT
Start: 2019-07-10 | End: 2019-07-10

## 2019-07-10 RX ADMIN — ALBUTEROL SULFATE 2.5 MG: 2.5 SOLUTION RESPIRATORY (INHALATION) at 16:53

## 2019-07-16 DIAGNOSIS — M33.20 POLYMYOSITIS (HCC): ICD-10-CM

## 2019-07-16 LAB
A/G RATIO: 1.7 (ref 1.1–2.2)
ALBUMIN SERPL-MCNC: 3.9 G/DL (ref 3.4–5)
ALP BLD-CCNC: 57 U/L (ref 40–129)
ALT SERPL-CCNC: 57 U/L (ref 10–40)
ANION GAP SERPL CALCULATED.3IONS-SCNC: 14 MMOL/L (ref 3–16)
AST SERPL-CCNC: 41 U/L (ref 15–37)
BASOPHILS ABSOLUTE: 0 K/UL (ref 0–0.2)
BASOPHILS RELATIVE PERCENT: 0.5 %
BILIRUB SERPL-MCNC: 0.4 MG/DL (ref 0–1)
BUN BLDV-MCNC: 19 MG/DL (ref 7–20)
C-REACTIVE PROTEIN: 2.9 MG/L (ref 0–5.1)
CALCIUM SERPL-MCNC: 9.4 MG/DL (ref 8.3–10.6)
CHLORIDE BLD-SCNC: 103 MMOL/L (ref 99–110)
CO2: 25 MMOL/L (ref 21–32)
CREAT SERPL-MCNC: 0.8 MG/DL (ref 0.6–1.1)
EOSINOPHILS ABSOLUTE: 0.2 K/UL (ref 0–0.6)
EOSINOPHILS RELATIVE PERCENT: 2.1 %
GFR AFRICAN AMERICAN: >60
GFR NON-AFRICAN AMERICAN: >60
GLOBULIN: 2.3 G/DL
GLUCOSE BLD-MCNC: 85 MG/DL (ref 70–99)
HCT VFR BLD CALC: 40.5 % (ref 36–48)
HEMOGLOBIN: 13.5 G/DL (ref 12–16)
LYMPHOCYTES ABSOLUTE: 2.5 K/UL (ref 1–5.1)
LYMPHOCYTES RELATIVE PERCENT: 30.4 %
MCH RBC QN AUTO: 30.4 PG (ref 26–34)
MCHC RBC AUTO-ENTMCNC: 33.3 G/DL (ref 31–36)
MCV RBC AUTO: 91.4 FL (ref 80–100)
MONOCYTES ABSOLUTE: 0.7 K/UL (ref 0–1.3)
MONOCYTES RELATIVE PERCENT: 8.2 %
NEUTROPHILS ABSOLUTE: 4.9 K/UL (ref 1.7–7.7)
NEUTROPHILS RELATIVE PERCENT: 58.8 %
PDW BLD-RTO: 14.2 % (ref 12.4–15.4)
PLATELET # BLD: 313 K/UL (ref 135–450)
PMV BLD AUTO: 8.5 FL (ref 5–10.5)
POTASSIUM SERPL-SCNC: 3.9 MMOL/L (ref 3.5–5.1)
RBC # BLD: 4.43 M/UL (ref 4–5.2)
REASON FOR REJECTION: NORMAL
REJECTED TEST: NORMAL
SEDIMENTATION RATE, ERYTHROCYTE: 10 MM/HR (ref 0–30)
SODIUM BLD-SCNC: 142 MMOL/L (ref 136–145)
TOTAL CK: 591 U/L (ref 26–192)
TOTAL PROTEIN: 6.2 G/DL (ref 6.4–8.2)
WBC # BLD: 8.3 K/UL (ref 4–11)

## 2019-07-18 LAB — ALDOLASE: 26.6 U/L (ref 1.5–8.1)

## 2019-07-22 LAB — MISCELLANEOUS LAB TEST ORDER: ABNORMAL

## 2019-07-24 ENCOUNTER — NURSE ONLY (OUTPATIENT)
Dept: INTERNAL MEDICINE CLINIC | Age: 54
End: 2019-07-24
Payer: COMMERCIAL

## 2019-07-24 ENCOUNTER — OFFICE VISIT (OUTPATIENT)
Dept: RHEUMATOLOGY | Age: 54
End: 2019-07-24
Payer: COMMERCIAL

## 2019-07-24 ENCOUNTER — TELEPHONE (OUTPATIENT)
Dept: INTERNAL MEDICINE CLINIC | Age: 54
End: 2019-07-24

## 2019-07-24 VITALS
HEIGHT: 60 IN | BODY MASS INDEX: 28.47 KG/M2 | DIASTOLIC BLOOD PRESSURE: 70 MMHG | WEIGHT: 145 LBS | SYSTOLIC BLOOD PRESSURE: 116 MMHG

## 2019-07-24 DIAGNOSIS — Z23 NEED FOR HEPATITIS B BOOSTER VACCINATION: Primary | ICD-10-CM

## 2019-07-24 DIAGNOSIS — Z00.00 ROUTINE GENERAL MEDICAL EXAMINATION AT A HEALTH CARE FACILITY: Primary | ICD-10-CM

## 2019-07-24 DIAGNOSIS — Z79.899 HIGH RISK MEDICATION USE: ICD-10-CM

## 2019-07-24 DIAGNOSIS — M33.20 POLYMYOSITIS (HCC): Primary | ICD-10-CM

## 2019-07-24 DIAGNOSIS — R20.2 RIGHT HAND PARESTHESIA: ICD-10-CM

## 2019-07-24 PROCEDURE — 90471 IMMUNIZATION ADMIN: CPT | Performed by: INTERNAL MEDICINE

## 2019-07-24 PROCEDURE — 90746 HEPB VACCINE 3 DOSE ADULT IM: CPT | Performed by: INTERNAL MEDICINE

## 2019-07-24 PROCEDURE — 99214 OFFICE O/P EST MOD 30 MIN: CPT | Performed by: INTERNAL MEDICINE

## 2019-07-24 RX ORDER — AZATHIOPRINE 50 MG/1
TABLET ORAL
Qty: 60 TABLET | Refills: 1 | Status: SHIPPED | OUTPATIENT
Start: 2019-07-24 | End: 2019-09-09

## 2019-07-24 RX ORDER — PREDNISONE 10 MG/1
TABLET ORAL
Qty: 120 TABLET | Refills: 1 | Status: SHIPPED | OUTPATIENT
Start: 2019-07-24 | End: 2019-09-09

## 2019-07-24 RX ORDER — AZATHIOPRINE 50 MG/1
TABLET ORAL
Qty: 120 TABLET | Refills: 2 | Status: SHIPPED | OUTPATIENT
Start: 2019-07-24 | End: 2019-11-13 | Stop reason: SDUPTHER

## 2019-07-24 NOTE — PROGRESS NOTES
delayed Rx. Bilateral paresthesias and carpal tunnel distribution-improved with wrist brace. Today's visit-  Feels nearly 100% better other than paresthesias in her fingers, right hand persistent, left hand occasionally. This is affecting ADLs. Tolerating medications well. Is on 40 mg of prednisone and 50 mg of azathioprine. Labs showed improvement in CPK, aldolase, and inflammatory markers. TPMT activity is mildly  Low ( 22.2, normal is 24-44). No objective muscle weakness today. PFT normal-DLCO, mild restrictive defect though to be from poor chest wall effort. Susana Avina Up-to-date with age-specific cancer screening. Plan-  Obtain nerve conduction study for suspected right-sided median nerve compression. For polymyositis-  Increase azathioprine 100 mg a day, taper prednisone, tapering directions were discussed with patient. Call me with any concerns or questions. Follow-up in blood work in 4 weeks. Patient indicates understanding and agrees with the management plan. I reviewed patient's history, referral documents and electronic medical records. Copy of consult note is being routed electronically/faxed to referring physician. #######################################################################    Subjective-  Follow-up for polymyositis. States that she feels 100% better in terms of muscle and joint pain and weakness. Only concern she has is constant tingling and numbness in the right hand, which affects ADLs. She started going to the gym, does light exercises. Skin and periungual changes resolved. Labs-improved. Tolerating medications well. Denies any GI upset, infections, irritability, weight gain. No dysphagia, diplopia or weakness in any muscles. All other review of systems are negative. Labs- Elevated CPK, aldolase, sed rate, crp, strongly positive anti SHANDRA. Rf, CCP negatve. All other ROS are negative.     Past Medical History:   Diagnosis Date    Allergic rhinitis     Asthma ischemia or deformities noted in digits or nails. Mechanics hand and feet. HEENT: Normal lids, lacrimal glands and pupils. No oral or nasal ulcers. Salivary glands reveal no evidence of abnormality. External inspection of the ears and nose within normal limits. Neck: No masses or asymmetry. No thyroid enlargement. Chest: Normal effort, clear to auscultation. Heart s1/2 regular, no added sounds. DATA:   Lab Results   Component Value Date    WBC 8.3 07/16/2019    HGB 13.5 07/16/2019    HCT 40.5 07/16/2019    MCV 91.4 07/16/2019     07/16/2019         Chemistry        Component Value Date/Time     07/16/2019 0743    K 3.9 07/16/2019 0743     07/16/2019 0743    CO2 25 07/16/2019 0743    BUN 19 07/16/2019 0743    CREATININE 0.8 07/16/2019 0743        Component Value Date/Time    CALCIUM 9.4 07/16/2019 0743    ALKPHOS 57 07/16/2019 0743    AST 41 (H) 07/16/2019 0743    ALT 57 (H) 07/16/2019 0743    BILITOT 0.4 07/16/2019 0743          No results found for: [facility]  Lab Results   Component Value Date    SEDRATE 10 07/16/2019     Lab Results   Component Value Date    CRP 2.9 07/16/2019     Lab Results   Component Value Date    MICHELLE POSITIVE (A) 06/12/2019    SEDRATE 10 07/16/2019     Lab Results   Component Value Date    NCBWEQI 428 (H) 07/16/2019     Lab Results   Component Value Date    TSH 2.40 07/26/2018     Lab Results   Component Value Date    VITD25 33.1 07/29/2017         Radiology Review:    7/9/19-   IMPRESSION:  Mild restrictive ventilatory defect. Finding may be  consistent with neuromuscular chest wall, pleural, or parenchymal  disorder, including mild obesity.     A/P- See above.

## 2019-07-26 LAB — MISCELLANEOUS LAB TEST ORDER: ABNORMAL

## 2019-08-05 ENCOUNTER — PATIENT MESSAGE (OUTPATIENT)
Dept: RHEUMATOLOGY | Age: 54
End: 2019-08-05

## 2019-08-05 ENCOUNTER — HOSPITAL ENCOUNTER (OUTPATIENT)
Dept: NEUROLOGY | Age: 54
Discharge: HOME | End: 2019-08-05
Payer: COMMERCIAL

## 2019-08-05 ENCOUNTER — TELEPHONE (OUTPATIENT)
Dept: RHEUMATOLOGY | Age: 54
End: 2019-08-05

## 2019-08-05 DIAGNOSIS — G56.03 BILATERAL CARPAL TUNNEL SYNDROME: Primary | ICD-10-CM

## 2019-08-05 PROCEDURE — 95886 MUSC TEST DONE W/N TEST COMP: CPT | Performed by: PHYSICAL MEDICINE & REHABILITATION

## 2019-08-05 PROCEDURE — 95908 NRV CNDJ TST 3-4 STUDIES: CPT | Performed by: PHYSICAL MEDICINE & REHABILITATION

## 2019-08-05 NOTE — PROCEDURES
Elbow    5.4  4.9  A Elbow B Elbow 1.2 8.0 67 >50   A Elbow    6.6  4.3            EMG     Side Muscle Nerve Root Ins Act Fibs Psw Amp Dur Poly Recrt Int Rodríguez Pickerel Comment   Right Deltoid Axillary C5-6 Nml Nml Nml Nml Nml 0 Nml Nml    Right Biceps Musculocut C5-6 Nml Nml Nml Nml Nml 0 Nml Nml    Right Triceps Radial C6-7-8 Nml Nml Nml Nml Nml 0 Nml Nml    Right BrachioRad Radial C5-6 Nml Nml Nml Nml Nml 0 Nml Nml    Right PronatorTeres Median C6-7 Nml Nml Nml Nml Nml 0 Nml Nml    Right Ext Indicis Radial (Post Int) C7-8 Nml Nml Nml Nml Nml 0 Nml Nml    Right 1stDorInt Ulnar C8-T1 Nml Nml Nml Nml Nml 0 Nml Nml    Right Abd Poll Brev Median C8-T1 Nml Nml Nml Nml Nml 0 Nml Nml    Right Cervical Parasp Up Rami C1-3 Nml Nml Nml         Right Cervical Parasp Mid Rami C4-6 Nml Nml Nml         Right Cervical Parasp Low Rami C7-8 Nml Nml Nml         Electronically signed by Christina Boston DO on 8/5/2019 at 4:07 PM

## 2019-08-06 NOTE — TELEPHONE ENCOUNTER
From: Nathaniel Hays  To: Meghna Eagle MD  Sent: 8/5/2019 5:33 PM EDT  Subject: Test Results Question    Hello, I received your nurses call this afternoon on my way home from the nerve test. She said to keep wearing the wrist brace during sleep. The doctor at the test said I had moderate to severe carpel tunnel. If you think I need surgery, I would like to schedule it as soon as possible. I have vacation to schedule before the end of the year and would rather get it done sooner than later. Thanks.

## 2019-08-16 ENCOUNTER — OFFICE VISIT (OUTPATIENT)
Dept: ORTHOPEDIC SURGERY | Age: 54
End: 2019-08-16
Payer: COMMERCIAL

## 2019-08-16 VITALS — BODY MASS INDEX: 28.48 KG/M2 | WEIGHT: 145.06 LBS | HEIGHT: 60 IN | RESPIRATION RATE: 15 BRPM

## 2019-08-16 DIAGNOSIS — G56.01 RIGHT CARPAL TUNNEL SYNDROME: ICD-10-CM

## 2019-08-16 DIAGNOSIS — Z00.00 ROUTINE GENERAL MEDICAL EXAMINATION AT A HEALTH CARE FACILITY: ICD-10-CM

## 2019-08-16 DIAGNOSIS — E03.9 ACQUIRED HYPOTHYROIDISM: ICD-10-CM

## 2019-08-16 DIAGNOSIS — Z79.899 HIGH RISK MEDICATION USE: ICD-10-CM

## 2019-08-16 LAB
A/G RATIO: 2.1 (ref 1.1–2.2)
ALBUMIN SERPL-MCNC: 4 G/DL (ref 3.4–5)
ALBUMIN SERPL-MCNC: 4.1 G/DL (ref 3.4–5)
ALP BLD-CCNC: 52 U/L (ref 40–129)
ALP BLD-CCNC: 55 U/L (ref 40–129)
ALT SERPL-CCNC: 24 U/L (ref 10–40)
ALT SERPL-CCNC: 25 U/L (ref 10–40)
ANION GAP SERPL CALCULATED.3IONS-SCNC: 12 MMOL/L (ref 3–16)
AST SERPL-CCNC: 19 U/L (ref 15–37)
AST SERPL-CCNC: 21 U/L (ref 15–37)
BASOPHILS ABSOLUTE: 0 K/UL (ref 0–0.2)
BASOPHILS RELATIVE PERCENT: 0.3 %
BILIRUB SERPL-MCNC: 0.5 MG/DL (ref 0–1)
BILIRUB SERPL-MCNC: 0.5 MG/DL (ref 0–1)
BILIRUBIN DIRECT: <0.2 MG/DL (ref 0–0.3)
BILIRUBIN, INDIRECT: ABNORMAL MG/DL (ref 0–1)
BUN BLDV-MCNC: 14 MG/DL (ref 7–20)
C-REACTIVE PROTEIN: 1 MG/L (ref 0–5.1)
CALCIUM SERPL-MCNC: 9.3 MG/DL (ref 8.3–10.6)
CHLORIDE BLD-SCNC: 102 MMOL/L (ref 99–110)
CHOLESTEROL, FASTING: 192 MG/DL (ref 0–199)
CO2: 29 MMOL/L (ref 21–32)
CREAT SERPL-MCNC: 0.7 MG/DL (ref 0.6–1.1)
CREAT SERPL-MCNC: 0.8 MG/DL (ref 0.6–1.1)
EOSINOPHILS ABSOLUTE: 0.1 K/UL (ref 0–0.6)
EOSINOPHILS RELATIVE PERCENT: 0.8 %
ESTIMATED AVERAGE GLUCOSE: 105.4 MG/DL
GFR AFRICAN AMERICAN: >60
GFR AFRICAN AMERICAN: >60
GFR NON-AFRICAN AMERICAN: >60
GFR NON-AFRICAN AMERICAN: >60
GLOBULIN: 2 G/DL
GLUCOSE BLD-MCNC: 87 MG/DL (ref 70–99)
HBA1C MFR BLD: 5.3 %
HCT VFR BLD CALC: 42.1 % (ref 36–48)
HDLC SERPL-MCNC: 60 MG/DL (ref 40–60)
HEMOGLOBIN: 13.8 G/DL (ref 12–16)
LDL CHOLESTEROL CALCULATED: 88 MG/DL
LYMPHOCYTES ABSOLUTE: 2.6 K/UL (ref 1–5.1)
LYMPHOCYTES RELATIVE PERCENT: 25.5 %
MCH RBC QN AUTO: 30.4 PG (ref 26–34)
MCHC RBC AUTO-ENTMCNC: 32.8 G/DL (ref 31–36)
MCV RBC AUTO: 92.9 FL (ref 80–100)
MONOCYTES ABSOLUTE: 0.8 K/UL (ref 0–1.3)
MONOCYTES RELATIVE PERCENT: 8.2 %
NEUTROPHILS ABSOLUTE: 6.6 K/UL (ref 1.7–7.7)
NEUTROPHILS RELATIVE PERCENT: 65.2 %
PDW BLD-RTO: 15 % (ref 12.4–15.4)
PLATELET # BLD: 315 K/UL (ref 135–450)
PMV BLD AUTO: 8.5 FL (ref 5–10.5)
POTASSIUM SERPL-SCNC: 3.9 MMOL/L (ref 3.5–5.1)
RBC # BLD: 4.53 M/UL (ref 4–5.2)
SEDIMENTATION RATE, ERYTHROCYTE: 8 MM/HR (ref 0–30)
SODIUM BLD-SCNC: 143 MMOL/L (ref 136–145)
TOTAL CK: 203 U/L (ref 26–192)
TOTAL PROTEIN: 6 G/DL (ref 6.4–8.2)
TOTAL PROTEIN: 6.1 G/DL (ref 6.4–8.2)
TRIGLYCERIDE, FASTING: 219 MG/DL (ref 0–150)
TSH REFLEX: 2.21 UIU/ML (ref 0.27–4.2)
VITAMIN D 25-HYDROXY: 25.9 NG/ML
VLDLC SERPL CALC-MCNC: 44 MG/DL
WBC # BLD: 10 K/UL (ref 4–11)

## 2019-08-16 PROCEDURE — 99243 OFF/OP CNSLTJ NEW/EST LOW 30: CPT | Performed by: ORTHOPAEDIC SURGERY

## 2019-08-16 RX ORDER — LEVOTHYROXINE SODIUM 0.1 MG/1
TABLET ORAL
Qty: 90 TABLET | Refills: 0 | Status: SHIPPED | OUTPATIENT
Start: 2019-08-16 | End: 2019-12-02 | Stop reason: SDUPTHER

## 2019-08-19 LAB — ALDOLASE: 9 U/L (ref 1.5–8.1)

## 2019-08-30 ENCOUNTER — TELEPHONE (OUTPATIENT)
Dept: ORTHOPEDIC SURGERY | Age: 54
End: 2019-08-30

## 2019-08-30 ENCOUNTER — OFFICE VISIT (OUTPATIENT)
Dept: RHEUMATOLOGY | Age: 54
End: 2019-08-30
Payer: COMMERCIAL

## 2019-08-30 ENCOUNTER — OFFICE VISIT (OUTPATIENT)
Dept: INTERNAL MEDICINE CLINIC | Age: 54
End: 2019-08-30
Payer: COMMERCIAL

## 2019-08-30 VITALS
SYSTOLIC BLOOD PRESSURE: 124 MMHG | DIASTOLIC BLOOD PRESSURE: 76 MMHG | HEIGHT: 60 IN | BODY MASS INDEX: 27.88 KG/M2 | WEIGHT: 142 LBS

## 2019-08-30 VITALS
BODY MASS INDEX: 27.88 KG/M2 | WEIGHT: 142 LBS | SYSTOLIC BLOOD PRESSURE: 124 MMHG | DIASTOLIC BLOOD PRESSURE: 76 MMHG | HEIGHT: 60 IN

## 2019-08-30 DIAGNOSIS — G56.01 RIGHT CARPAL TUNNEL SYNDROME: ICD-10-CM

## 2019-08-30 DIAGNOSIS — Z79.899 HIGH RISK MEDICATION USE: ICD-10-CM

## 2019-08-30 DIAGNOSIS — E55.9 VITAMIN D DEFICIENCY: ICD-10-CM

## 2019-08-30 DIAGNOSIS — J45.21 MILD INTERMITTENT ASTHMA WITH ACUTE EXACERBATION: ICD-10-CM

## 2019-08-30 DIAGNOSIS — M33.20 POLYMYOSITIS (HCC): Primary | ICD-10-CM

## 2019-08-30 DIAGNOSIS — Z01.818 PREOP EXAMINATION: ICD-10-CM

## 2019-08-30 DIAGNOSIS — Z00.00 ROUTINE GENERAL MEDICAL EXAMINATION AT A HEALTH CARE FACILITY: Primary | ICD-10-CM

## 2019-08-30 DIAGNOSIS — G56.03 BILATERAL CARPAL TUNNEL SYNDROME: ICD-10-CM

## 2019-08-30 DIAGNOSIS — E03.9 ACQUIRED HYPOTHYROIDISM: ICD-10-CM

## 2019-08-30 PROCEDURE — 99214 OFFICE O/P EST MOD 30 MIN: CPT | Performed by: INTERNAL MEDICINE

## 2019-08-30 PROCEDURE — 99396 PREV VISIT EST AGE 40-64: CPT | Performed by: NURSE PRACTITIONER

## 2019-08-30 PROCEDURE — 90746 HEPB VACCINE 3 DOSE ADULT IM: CPT | Performed by: NURSE PRACTITIONER

## 2019-08-30 PROCEDURE — 90471 IMMUNIZATION ADMIN: CPT | Performed by: NURSE PRACTITIONER

## 2019-08-30 RX ORDER — AZATHIOPRINE 50 MG/1
TABLET ORAL
Qty: 180 TABLET | Refills: 0 | Status: SHIPPED | OUTPATIENT
Start: 2019-08-30 | End: 2019-09-09

## 2019-08-30 RX ORDER — PREDNISONE 10 MG/1
TABLET ORAL
Qty: 120 TABLET | Refills: 1 | Status: SHIPPED | OUTPATIENT
Start: 2019-08-30 | End: 2021-03-23

## 2019-08-30 ASSESSMENT — ENCOUNTER SYMPTOMS
DIARRHEA: 0
RHINORRHEA: 0
BLOOD IN STOOL: 0
SINUS PRESSURE: 0
VOMITING: 0
COUGH: 0
EYE REDNESS: 0
SORE THROAT: 0
COLOR CHANGE: 0
CONSTIPATION: 0
ABDOMINAL PAIN: 0
WHEEZING: 0
EYE ITCHING: 0
SHORTNESS OF BREATH: 0
CHEST TIGHTNESS: 0
NAUSEA: 0
BACK PAIN: 0

## 2019-08-30 ASSESSMENT — PATIENT HEALTH QUESTIONNAIRE - PHQ9
SUM OF ALL RESPONSES TO PHQ QUESTIONS 1-9: 0
SUM OF ALL RESPONSES TO PHQ QUESTIONS 1-9: 0
2. FEELING DOWN, DEPRESSED OR HOPELESS: 0
SUM OF ALL RESPONSES TO PHQ9 QUESTIONS 1 & 2: 0
1. LITTLE INTEREST OR PLEASURE IN DOING THINGS: 0

## 2019-08-30 NOTE — TELEPHONE ENCOUNTER
Auth: NPR  Date: 9/11/19  Reference # [id number]  Spoke with: Kathleen MOBLEY  Type of SX: Outpatient  Location: [facility]  CPT [id number]   SX area: [facility]

## 2019-08-30 NOTE — PROGRESS NOTES
well-developed and well-nourished. HENT:   Head: Normocephalic. Right Ear: Hearing, tympanic membrane, external ear and ear canal normal.   Left Ear: Hearing, tympanic membrane, external ear and ear canal normal.   Nose: No mucosal edema or rhinorrhea. Right sinus exhibits no maxillary sinus tenderness and no frontal sinus tenderness. Left sinus exhibits no maxillary sinus tenderness and no frontal sinus tenderness. Mouth/Throat: No oropharyngeal exudate, posterior oropharyngeal edema, posterior oropharyngeal erythema or tonsillar abscesses. Eyes: Pupils are equal, round, and reactive to light. Conjunctivae and EOM are normal.   Neck: Normal range of motion. Neck supple. No JVD present. Cardiovascular: Normal rate, regular rhythm and normal heart sounds. Exam reveals no gallop and no friction rub. No murmur heard. Pulmonary/Chest: Effort normal and breath sounds normal. No respiratory distress. She has no wheezes. Abdominal: Soft. Bowel sounds are normal. She exhibits no distension and no mass. There is no tenderness. There is no rebound and no guarding. Musculoskeletal: Normal range of motion. She exhibits no tenderness. Lymphadenopathy:        Head (right side): No submental, no submandibular, no tonsillar, no preauricular, no posterior auricular and no occipital adenopathy present. Head (left side): No submental, no submandibular, no tonsillar, no preauricular, no posterior auricular and no occipital adenopathy present. She has no cervical adenopathy. Neurological: She is alert and oriented to person, place, and time. She has normal reflexes. Skin: Skin is warm and dry. Psychiatric: She has a normal mood and affect.  Her behavior is normal.       Assessment:      See ProblemList assessment and plan       Plan:      Routine general medical examination at a health care facility  Reviewed recent labs  HM up to date  Will see gyn for pap      Preop examination  Pre-Operative Risk

## 2019-09-10 ENCOUNTER — ANESTHESIA EVENT (OUTPATIENT)
Dept: OPERATING ROOM | Age: 54
End: 2019-09-10
Payer: COMMERCIAL

## 2019-09-10 NOTE — ANESTHESIA PRE PROCEDURE
AZATHIOPRINE Abdominal:           Vascular:     - DVT and PE. Anesthesia Plan      MAC and TIVA     ASA 3       Induction: intravenous. MIPS: Prophylactic antiemetics administered. Anesthetic plan and risks discussed with patient and mother. Plan discussed with CRNA.           Dayday Rebollar MD

## 2019-09-10 NOTE — H&P
I have reviewed the H&P and examined the patient and find no relevant changes. I have reviewed with the patient and/or family the risks, benefits, and alternatives to the procedure(s). Past Medical History:   Diagnosis Date    Allergic rhinitis     Arthritis     Asthma     Chronic sinusitis     Hypothyroidism     Polymyositis ([facility] [address]      Past Surgical History:   Procedure Laterality Date    NASAL SEPTUM SURGERY  2012    TONSILLECTOMY       No current facility-administered medications on file prior to encounter. Current Outpatient Medications on File Prior to Encounter   Medication Sig Dispense Refill    ALBUTEROL SULFATE IN Inhale into the lungs as needed      levothyroxine (SYNTHROID) 100 MCG tablet 1 tab daily 90 tablet 0    azaTHIOprine (IMURAN) 50 MG tablet Take 2 tab po daily. (Patient taking differently: Take 100 mg by mouth nightly ) 120 tablet 2    PARoxetine (PAXIL) 10 MG tablet Take 1 tablet by mouth every morning 90 tablet 1    mometasone (ELOCON) 0.1 % cream Apply topically daily.  (Patient taking differently: as needed Apply topically daily prn) 50 g [address] [facility]

## 2019-09-11 ENCOUNTER — ANESTHESIA (OUTPATIENT)
Dept: OPERATING ROOM | Age: 54
End: 2019-09-11
Payer: COMMERCIAL

## 2019-09-11 ENCOUNTER — HOSPITAL ENCOUNTER (OUTPATIENT)
Age: 54
Setting detail: OUTPATIENT SURGERY
Discharge: HOME | End: 2019-09-11
Attending: ORTHOPAEDIC SURGERY | Admitting: ORTHOPAEDIC SURGERY
Payer: COMMERCIAL

## 2019-09-11 VITALS
HEART RATE: 87 BPM | TEMPERATURE: 97.6 F | DIASTOLIC BLOOD PRESSURE: 64 MMHG | RESPIRATION RATE: 20 BRPM | HEIGHT: 60 IN | BODY MASS INDEX: 27.88 KG/M2 | SYSTOLIC BLOOD PRESSURE: 112 MMHG | WEIGHT: 142 LBS | OXYGEN SATURATION: 99 %

## 2019-09-11 VITALS — SYSTOLIC BLOOD PRESSURE: 87 MMHG | DIASTOLIC BLOOD PRESSURE: 54 MMHG | OXYGEN SATURATION: 98 %

## 2019-09-11 DIAGNOSIS — G56.01 RIGHT CARPAL TUNNEL SYNDROME: Primary | ICD-10-CM

## 2019-09-11 PROCEDURE — 2500000003 HC RX 250 WO HCPCS: Performed by: ANESTHESIOLOGY

## 2019-09-11 PROCEDURE — 2580000003 HC RX 258: Performed by: ANESTHESIOLOGY

## 2019-09-11 PROCEDURE — 2709999900 HC NON-CHARGEABLE SUPPLY: Performed by: ORTHOPAEDIC SURGERY

## 2019-09-11 PROCEDURE — 6360000002 HC RX W HCPCS: Performed by: NURSE ANESTHETIST, CERTIFIED REGISTERED

## 2019-09-11 PROCEDURE — 3700000001 HC ADD 15 MINUTES (ANESTHESIA): Performed by: ORTHOPAEDIC SURGERY

## 2019-09-11 PROCEDURE — 7100000010 HC PHASE II RECOVERY - FIRST 15 MIN: Performed by: ORTHOPAEDIC SURGERY

## 2019-09-11 PROCEDURE — 3700000000 HC ANESTHESIA ATTENDED CARE: Performed by: ORTHOPAEDIC SURGERY

## 2019-09-11 PROCEDURE — 3600000004 HC SURGERY LEVEL 4 BASE: Performed by: ORTHOPAEDIC SURGERY

## 2019-09-11 PROCEDURE — 7100000011 HC PHASE II RECOVERY - ADDTL 15 MIN: Performed by: ORTHOPAEDIC SURGERY

## 2019-09-11 PROCEDURE — 3600000014 HC SURGERY LEVEL 4 ADDTL 15MIN: Performed by: ORTHOPAEDIC SURGERY

## 2019-09-11 PROCEDURE — 2500000003 HC RX 250 WO HCPCS: Performed by: ORTHOPAEDIC SURGERY

## 2019-09-11 PROCEDURE — 2500000003 HC RX 250 WO HCPCS: Performed by: NURSE ANESTHETIST, CERTIFIED REGISTERED

## 2019-09-11 RX ORDER — HYDROCODONE BITARTRATE AND ACETAMINOPHEN 5; 325 MG/1; MG/1
1 TABLET ORAL EVERY 6 HOURS PRN
Qty: 10 TABLET | Refills: 0 | Status: SHIPPED | OUTPATIENT
Start: 2019-09-11 | End: 2019-09-18

## 2019-09-11 RX ORDER — PROMETHAZINE HYDROCHLORIDE 25 MG/ML
6.25 INJECTION, SOLUTION INTRAMUSCULAR; INTRAVENOUS
Status: DISCONTINUED | OUTPATIENT
Start: 2019-09-11 | End: 2019-09-11 | Stop reason: HOSPADM

## 2019-09-11 RX ORDER — OXYCODONE HYDROCHLORIDE AND ACETAMINOPHEN 5; 325 MG/1; MG/1
1 TABLET ORAL PRN
Status: DISCONTINUED | OUTPATIENT
Start: 2019-09-11 | End: 2019-09-11 | Stop reason: HOSPADM

## 2019-09-11 RX ORDER — OXYCODONE HYDROCHLORIDE AND ACETAMINOPHEN 5; 325 MG/1; MG/1
2 TABLET ORAL PRN
Status: DISCONTINUED | OUTPATIENT
Start: 2019-09-11 | End: 2019-09-11 | Stop reason: HOSPADM

## 2019-09-11 RX ORDER — IBUPROFEN 600 MG/1
600 TABLET ORAL EVERY 6 HOURS PRN
Qty: 60 TABLET | Refills: 1 | Status: ON HOLD | OUTPATIENT
Start: 2019-09-11 | End: 2019-12-27 | Stop reason: HOSPADM

## 2019-09-11 RX ORDER — MIDAZOLAM HYDROCHLORIDE 1 MG/ML
INJECTION INTRAMUSCULAR; INTRAVENOUS PRN
Status: DISCONTINUED | OUTPATIENT
Start: 2019-09-11 | End: 2019-09-11 | Stop reason: SDUPTHER

## 2019-09-11 RX ORDER — SODIUM CHLORIDE 0.9 % (FLUSH) 0.9 %
10 SYRINGE (ML) INJECTION EVERY 12 HOURS SCHEDULED
Status: DISCONTINUED | OUTPATIENT
Start: 2019-09-11 | End: 2019-09-11 | Stop reason: HOSPADM

## 2019-09-11 RX ORDER — SODIUM CHLORIDE, SODIUM LACTATE, POTASSIUM CHLORIDE, CALCIUM CHLORIDE 600; 310; 30; 20 MG/100ML; MG/100ML; MG/100ML; MG/100ML
INJECTION, SOLUTION INTRAVENOUS CONTINUOUS
Status: DISCONTINUED | OUTPATIENT
Start: 2019-09-11 | End: 2019-09-11 | Stop reason: HOSPADM

## 2019-09-11 RX ORDER — ONDANSETRON 2 MG/ML
4 INJECTION INTRAMUSCULAR; INTRAVENOUS
Status: DISCONTINUED | OUTPATIENT
Start: 2019-09-11 | End: 2019-09-11 | Stop reason: HOSPADM

## 2019-09-11 RX ORDER — LIDOCAINE HYDROCHLORIDE 20 MG/ML
INJECTION, SOLUTION INFILTRATION; PERINEURAL PRN
Status: DISCONTINUED | OUTPATIENT
Start: 2019-09-11 | End: 2019-09-11 | Stop reason: SDUPTHER

## 2019-09-11 RX ORDER — FENTANYL CITRATE 50 UG/ML
25 INJECTION, SOLUTION INTRAMUSCULAR; INTRAVENOUS EVERY 5 MIN PRN
Status: DISCONTINUED | OUTPATIENT
Start: 2019-09-11 | End: 2019-09-11 | Stop reason: HOSPADM

## 2019-09-11 RX ORDER — SODIUM CHLORIDE 0.9 % (FLUSH) 0.9 %
10 SYRINGE (ML) INJECTION PRN
Status: DISCONTINUED | OUTPATIENT
Start: 2019-09-11 | End: 2019-09-11 | Stop reason: HOSPADM

## 2019-09-11 RX ORDER — PROPOFOL 10 MG/ML
INJECTION, EMULSION INTRAVENOUS PRN
Status: DISCONTINUED | OUTPATIENT
Start: 2019-09-11 | End: 2019-09-11 | Stop reason: SDUPTHER

## 2019-09-11 RX ORDER — MEPERIDINE HYDROCHLORIDE 50 MG/ML
12.5 INJECTION INTRAMUSCULAR; INTRAVENOUS; SUBCUTANEOUS EVERY 5 MIN PRN
Status: DISCONTINUED | OUTPATIENT
Start: 2019-09-11 | End: 2019-09-11 | Stop reason: HOSPADM

## 2019-09-11 RX ORDER — HYDRALAZINE HYDROCHLORIDE 20 MG/ML
5 INJECTION INTRAMUSCULAR; INTRAVENOUS EVERY 10 MIN PRN
Status: DISCONTINUED | OUTPATIENT
Start: 2019-09-11 | End: 2019-09-11 | Stop reason: HOSPADM

## 2019-09-11 RX ORDER — FENTANYL CITRATE 50 UG/ML
INJECTION, SOLUTION INTRAMUSCULAR; INTRAVENOUS PRN
Status: DISCONTINUED | OUTPATIENT
Start: 2019-09-11 | End: 2019-09-11 | Stop reason: SDUPTHER

## 2019-09-11 RX ADMIN — MIDAZOLAM HYDROCHLORIDE 2 MG: 2 INJECTION, SOLUTION INTRAMUSCULAR; INTRAVENOUS at 07:39

## 2019-09-11 RX ADMIN — FENTANYL CITRATE 50 MCG: 50 INJECTION INTRAMUSCULAR; INTRAVENOUS at 07:41

## 2019-09-11 RX ADMIN — LIDOCAINE HYDROCHLORIDE 3 ML: 20 INJECTION, SOLUTION INFILTRATION; PERINEURAL at 07:44

## 2019-09-11 RX ADMIN — FAMOTIDINE 20 MG: 10 INJECTION, SOLUTION INTRAVENOUS at 06:55

## 2019-09-11 RX ADMIN — SODIUM CHLORIDE, POTASSIUM CHLORIDE, SODIUM LACTATE AND CALCIUM CHLORIDE: 600; 310; 30; 20 INJECTION, SOLUTION INTRAVENOUS at 07:18

## 2019-09-11 RX ADMIN — SODIUM CHLORIDE, POTASSIUM CHLORIDE, SODIUM LACTATE AND CALCIUM CHLORIDE: 600; 310; 30; 20 INJECTION, SOLUTION INTRAVENOUS at 06:45

## 2019-09-11 RX ADMIN — PROPOFOL 140 MG: 10 INJECTION, EMULSION INTRAVENOUS at 07:44

## 2019-09-11 RX ADMIN — FENTANYL CITRATE 50 MCG: 50 INJECTION INTRAMUSCULAR; INTRAVENOUS at 07:44

## 2019-09-11 ASSESSMENT — PULMONARY FUNCTION TESTS
PIF_VALUE: 0
PIF_VALUE: 1
PIF_VALUE: 0
PIF_VALUE: 0
PIF_VALUE: 1
PIF_VALUE: 0
PIF_VALUE: 1
PIF_VALUE: 0
PIF_VALUE: 0
PIF_VALUE: 1

## 2019-09-11 ASSESSMENT — LIFESTYLE VARIABLES: SMOKING_STATUS: 0

## 2019-09-11 ASSESSMENT — PAIN - FUNCTIONAL ASSESSMENT: PAIN_FUNCTIONAL_ASSESSMENT: 0-10

## 2019-09-11 ASSESSMENT — PAIN SCALES - GENERAL
PAINLEVEL_OUTOF10: 0

## 2019-09-11 NOTE — ANESTHESIA POSTPROCEDURE EVALUATION
Department of Anesthesiology  Postprocedure Note    Patient: Augustin Germain  MRN: [id number]  YOB: 1965  Date of evaluation: 9/11/2019    Procedure Summary     Date:  09/11/19 Room / Location:  [facility] OR 04 / [facility]    Anesthesia Start:  0740 Anesthesia Stop:  3883    Procedure:  RIGHT CARPAL TUNNEL RELEASE (Right Hand) Diagnosis:       Right carpal tunnel syndrome      (Right carpal tunnel syndrome)    Surgeon:  Gilbert Medrano MD Responsible Provider:  Jd Gasca MD    Anesthesia Type:  MAC, TIVA ASA Status:  3     Anesthesia Type: MAC, TIVA    Luis Daniel Phase I: Luis Daniel Score: 10    Luis Daniel Phase II: Luis Daniel Score: 10    Last vitals: Reviewed and per EMR flowsheets.      Anesthesia Post Evaluation   Anesthetic Problems: no   Cardiovascular System Stable: yes  Respiratory Function: Airway Patent yes  ETT no  Ventilator no  Level of consciousness: awake, alert and oriented  Post-op pain: adequate analgesia  Hydration Adequate: yes  Nausea/Vomiting:no  Other Issues:     Reema Parisi MD

## 2019-09-12 ENCOUNTER — HOSPITAL ENCOUNTER (OUTPATIENT)
Dept: GENERAL RADIOLOGY | Age: 54
Discharge: HOME | End: 2019-09-12
Payer: COMMERCIAL

## 2019-09-12 DIAGNOSIS — Z79.899 HIGH RISK MEDICATION USE: ICD-10-CM

## 2019-09-12 PROCEDURE — 77080 DXA BONE DENSITY AXIAL: CPT

## 2019-09-16 DIAGNOSIS — Z00.00 ROUTINE GENERAL MEDICAL EXAMINATION AT A HEALTH CARE FACILITY: ICD-10-CM

## 2019-09-16 DIAGNOSIS — Z79.899 HIGH RISK MEDICATION USE: ICD-10-CM

## 2019-09-16 DIAGNOSIS — E55.9 VITAMIN D DEFICIENCY: ICD-10-CM

## 2019-09-16 LAB
BILIRUBIN URINE: NEGATIVE
BLOOD, URINE: NEGATIVE
CHOLESTEROL, FASTING: 188 MG/DL (ref 0–199)
CLARITY: CLEAR
COLOR: YELLOW
GLUCOSE URINE: NEGATIVE MG/DL
HDLC SERPL-MCNC: 74 MG/DL (ref 40–60)
KETONES, URINE: NEGATIVE MG/DL
LDL CHOLESTEROL CALCULATED: 88 MG/DL
LEUKOCYTE ESTERASE, URINE: NEGATIVE
MICROSCOPIC EXAMINATION: NORMAL
NITRITE, URINE: NEGATIVE
PH UA: 6 (ref 5–8)
PROTEIN UA: NEGATIVE MG/DL
SPECIFIC GRAVITY UA: 1.01 (ref 1–1.03)
TOTAL CK: 171 U/L (ref 26–192)
TRIGLYCERIDE, FASTING: 130 MG/DL (ref 0–150)
TSH REFLEX: 3.85 UIU/ML (ref 0.27–4.2)
URINE TYPE: NORMAL
UROBILINOGEN, URINE: 0.2 E.U./DL
VITAMIN D 25-HYDROXY: 28.2 NG/ML
VLDLC SERPL CALC-MCNC: 26 MG/DL

## 2019-09-17 LAB
ESTIMATED AVERAGE GLUCOSE: 111.2 MG/DL
HBA1C MFR BLD: 5.5 %

## 2019-09-23 ENCOUNTER — OFFICE VISIT (OUTPATIENT)
Dept: ORTHOPEDIC SURGERY | Age: 54
End: 2019-09-23

## 2019-09-23 DIAGNOSIS — G56.01 RIGHT CARPAL TUNNEL SYNDROME: ICD-10-CM

## 2019-09-23 DIAGNOSIS — G56.01 RIGHT CARPAL TUNNEL SYNDROME: Primary | ICD-10-CM

## 2019-09-23 PROCEDURE — L3908 WHO COCK-UP NONMOLDE PRE OTS: HCPCS | Performed by: ORTHOPAEDIC SURGERY

## 2019-09-23 PROCEDURE — 99024 POSTOP FOLLOW-UP VISIT: CPT | Performed by: ORTHOPAEDIC SURGERY

## 2019-09-29 PROBLEM — Z01.818 PREOP EXAMINATION: Status: RESOLVED | Noted: 2019-08-30 | Resolved: 2019-09-29

## 2019-09-29 PROBLEM — Z00.00 ROUTINE GENERAL MEDICAL EXAMINATION AT A HEALTH CARE FACILITY: Status: RESOLVED | Noted: 2019-08-30 | Resolved: 2019-09-29

## 2019-10-08 ENCOUNTER — OFFICE VISIT (OUTPATIENT)
Dept: RHEUMATOLOGY | Age: 54
End: 2019-10-08
Payer: COMMERCIAL

## 2019-10-08 VITALS
BODY MASS INDEX: 28.86 KG/M2 | HEIGHT: 60 IN | WEIGHT: 147 LBS | DIASTOLIC BLOOD PRESSURE: 78 MMHG | SYSTOLIC BLOOD PRESSURE: 120 MMHG

## 2019-10-08 DIAGNOSIS — Z79.899 HIGH RISK MEDICATION USE: ICD-10-CM

## 2019-10-08 DIAGNOSIS — M33.20 POLYMYOSITIS (HCC): Primary | ICD-10-CM

## 2019-10-08 PROCEDURE — 99214 OFFICE O/P EST MOD 30 MIN: CPT | Performed by: INTERNAL MEDICINE

## 2019-11-13 RX ORDER — AZATHIOPRINE 50 MG/1
TABLET ORAL
Qty: 120 TABLET | Refills: 2 | Status: SHIPPED | OUTPATIENT
Start: 2019-11-13 | End: 2019-12-30 | Stop reason: SDUPTHER

## 2019-11-15 DIAGNOSIS — Z79.899 HIGH RISK MEDICATION USE: ICD-10-CM

## 2019-11-15 LAB
ALBUMIN SERPL-MCNC: 3.9 G/DL (ref 3.4–5)
ALP BLD-CCNC: 61 U/L (ref 40–129)
ALT SERPL-CCNC: 23 U/L (ref 10–40)
AST SERPL-CCNC: 26 U/L (ref 15–37)
BASOPHILS ABSOLUTE: 0 K/UL (ref 0–0.2)
BASOPHILS RELATIVE PERCENT: 0.5 %
BILIRUB SERPL-MCNC: 0.5 MG/DL (ref 0–1)
BILIRUBIN DIRECT: <0.2 MG/DL (ref 0–0.3)
BILIRUBIN, INDIRECT: ABNORMAL MG/DL (ref 0–1)
C-REACTIVE PROTEIN: 3.5 MG/L (ref 0–5.1)
CREAT SERPL-MCNC: 0.7 MG/DL (ref 0.6–1.1)
EOSINOPHILS ABSOLUTE: 0.3 K/UL (ref 0–0.6)
EOSINOPHILS RELATIVE PERCENT: 4.7 %
GFR AFRICAN AMERICAN: >60
GFR NON-AFRICAN AMERICAN: >60
HCT VFR BLD CALC: 41.2 % (ref 36–48)
HEMOGLOBIN: 13.7 G/DL (ref 12–16)
LYMPHOCYTES ABSOLUTE: 1.3 K/UL (ref 1–5.1)
LYMPHOCYTES RELATIVE PERCENT: 19.7 %
MCH RBC QN AUTO: 31.1 PG (ref 26–34)
MCHC RBC AUTO-ENTMCNC: 33.3 G/DL (ref 31–36)
MCV RBC AUTO: 93.3 FL (ref 80–100)
MONOCYTES ABSOLUTE: 0.5 K/UL (ref 0–1.3)
MONOCYTES RELATIVE PERCENT: 7.7 %
NEUTROPHILS ABSOLUTE: 4.4 K/UL (ref 1.7–7.7)
NEUTROPHILS RELATIVE PERCENT: 67.4 %
PDW BLD-RTO: 14.5 % (ref 12.4–15.4)
PLATELET # BLD: 325 K/UL (ref 135–450)
PMV BLD AUTO: 8.4 FL (ref 5–10.5)
RBC # BLD: 4.41 M/UL (ref 4–5.2)
SEDIMENTATION RATE, ERYTHROCYTE: 16 MM/HR (ref 0–30)
TOTAL CK: 130 U/L (ref 26–192)
TOTAL PROTEIN: 6.2 G/DL (ref 6.4–8.2)
WBC # BLD: 6.6 K/UL (ref 4–11)

## 2019-11-17 LAB — ALDOLASE: 6.8 U/L (ref 1.5–8.1)

## 2019-11-19 ENCOUNTER — OFFICE VISIT (OUTPATIENT)
Dept: ORTHOPEDIC SURGERY | Age: 54
End: 2019-11-19
Payer: COMMERCIAL

## 2019-11-19 DIAGNOSIS — G56.02 LEFT CARPAL TUNNEL SYNDROME: ICD-10-CM

## 2019-11-19 PROCEDURE — 99213 OFFICE O/P EST LOW 20 MIN: CPT | Performed by: ORTHOPAEDIC SURGERY

## 2019-12-02 DIAGNOSIS — E03.9 ACQUIRED HYPOTHYROIDISM: ICD-10-CM

## 2019-12-02 RX ORDER — PAROXETINE 10 MG/1
10 TABLET, FILM COATED ORAL EVERY MORNING
Qty: 90 TABLET | Refills: 1 | Status: SHIPPED | OUTPATIENT
Start: 2019-12-02 | End: 2021-03-23

## 2019-12-02 RX ORDER — LEVOTHYROXINE SODIUM 0.1 MG/1
TABLET ORAL
Qty: 90 TABLET | Refills: 0 | Status: SHIPPED | OUTPATIENT
Start: 2019-12-02 | End: 2020-02-26 | Stop reason: SDUPTHER

## 2019-12-12 ENCOUNTER — OFFICE VISIT (OUTPATIENT)
Dept: RHEUMATOLOGY | Age: 54
End: 2019-12-12
Payer: COMMERCIAL

## 2019-12-12 VITALS
BODY MASS INDEX: 29.25 KG/M2 | HEIGHT: 60 IN | SYSTOLIC BLOOD PRESSURE: 118 MMHG | DIASTOLIC BLOOD PRESSURE: 76 MMHG | WEIGHT: 149 LBS

## 2019-12-12 DIAGNOSIS — M33.20 POLYMYOSITIS (HCC): Primary | ICD-10-CM

## 2019-12-12 DIAGNOSIS — Z79.899 HIGH RISK MEDICATION USE: ICD-10-CM

## 2019-12-12 PROCEDURE — 99214 OFFICE O/P EST MOD 30 MIN: CPT | Performed by: INTERNAL MEDICINE

## 2019-12-20 ENCOUNTER — TELEPHONE (OUTPATIENT)
Dept: ORTHOPEDIC SURGERY | Age: 54
End: 2019-12-20

## 2019-12-26 ENCOUNTER — ANESTHESIA EVENT (OUTPATIENT)
Dept: OPERATING ROOM | Age: 54
End: 2019-12-26
Payer: COMMERCIAL

## 2019-12-27 ENCOUNTER — ANESTHESIA (OUTPATIENT)
Dept: OPERATING ROOM | Age: 54
End: 2019-12-27
Payer: COMMERCIAL

## 2019-12-27 ENCOUNTER — HOSPITAL ENCOUNTER (OUTPATIENT)
Age: 54
Setting detail: OUTPATIENT SURGERY
Discharge: HOME | End: 2019-12-27
Attending: ORTHOPAEDIC SURGERY | Admitting: ORTHOPAEDIC SURGERY
Payer: COMMERCIAL

## 2019-12-27 VITALS
HEART RATE: 67 BPM | TEMPERATURE: 97.9 F | RESPIRATION RATE: 16 BRPM | BODY MASS INDEX: 29.25 KG/M2 | SYSTOLIC BLOOD PRESSURE: 110 MMHG | HEIGHT: 60 IN | WEIGHT: 149 LBS | OXYGEN SATURATION: 99 % | DIASTOLIC BLOOD PRESSURE: 67 MMHG

## 2019-12-27 VITALS — DIASTOLIC BLOOD PRESSURE: 64 MMHG | SYSTOLIC BLOOD PRESSURE: 96 MMHG | OXYGEN SATURATION: 95 %

## 2019-12-27 DIAGNOSIS — G56.02 LEFT CARPAL TUNNEL SYNDROME: Primary | ICD-10-CM

## 2019-12-27 PROCEDURE — 7100000011 HC PHASE II RECOVERY - ADDTL 15 MIN: Performed by: ORTHOPAEDIC SURGERY

## 2019-12-27 PROCEDURE — 3600000012 HC SURGERY LEVEL 2 ADDTL 15MIN: Performed by: ORTHOPAEDIC SURGERY

## 2019-12-27 PROCEDURE — 2580000003 HC RX 258: Performed by: NURSE ANESTHETIST, CERTIFIED REGISTERED

## 2019-12-27 PROCEDURE — 2580000003 HC RX 258: Performed by: ANESTHESIOLOGY

## 2019-12-27 PROCEDURE — 3700000000 HC ANESTHESIA ATTENDED CARE: Performed by: ORTHOPAEDIC SURGERY

## 2019-12-27 PROCEDURE — 2709999900 HC NON-CHARGEABLE SUPPLY: Performed by: ORTHOPAEDIC SURGERY

## 2019-12-27 PROCEDURE — 7100000010 HC PHASE II RECOVERY - FIRST 15 MIN: Performed by: ORTHOPAEDIC SURGERY

## 2019-12-27 PROCEDURE — 3700000001 HC ADD 15 MINUTES (ANESTHESIA): Performed by: ORTHOPAEDIC SURGERY

## 2019-12-27 PROCEDURE — 2500000003 HC RX 250 WO HCPCS: Performed by: NURSE ANESTHETIST, CERTIFIED REGISTERED

## 2019-12-27 PROCEDURE — 2500000003 HC RX 250 WO HCPCS: Performed by: ORTHOPAEDIC SURGERY

## 2019-12-27 PROCEDURE — 6360000002 HC RX W HCPCS: Performed by: NURSE ANESTHETIST, CERTIFIED REGISTERED

## 2019-12-27 PROCEDURE — 3600000002 HC SURGERY LEVEL 2 BASE: Performed by: ORTHOPAEDIC SURGERY

## 2019-12-27 RX ORDER — HYDROCODONE BITARTRATE AND ACETAMINOPHEN 5; 325 MG/1; MG/1
1 TABLET ORAL EVERY 6 HOURS PRN
Qty: 10 TABLET | Refills: 0 | Status: SHIPPED | OUTPATIENT
Start: 2019-12-27 | End: 2020-01-03

## 2019-12-27 RX ORDER — IBUPROFEN 600 MG/1
600 TABLET ORAL EVERY 6 HOURS PRN
Qty: 60 TABLET | Refills: 1 | Status: SHIPPED | OUTPATIENT
Start: 2019-12-27

## 2019-12-27 RX ORDER — PROPOFOL 10 MG/ML
INJECTION, EMULSION INTRAVENOUS PRN
Status: DISCONTINUED | OUTPATIENT
Start: 2019-12-27 | End: 2019-12-27 | Stop reason: SDUPTHER

## 2019-12-27 RX ORDER — LIDOCAINE HYDROCHLORIDE 10 MG/ML
1 INJECTION, SOLUTION EPIDURAL; INFILTRATION; INTRACAUDAL; PERINEURAL
Status: DISCONTINUED | OUTPATIENT
Start: 2019-12-27 | End: 2019-12-27 | Stop reason: HOSPADM

## 2019-12-27 RX ORDER — SODIUM CHLORIDE 0.9 % (FLUSH) 0.9 %
10 SYRINGE (ML) INJECTION PRN
Status: DISCONTINUED | OUTPATIENT
Start: 2019-12-27 | End: 2019-12-27 | Stop reason: HOSPADM

## 2019-12-27 RX ORDER — SODIUM CHLORIDE, SODIUM LACTATE, POTASSIUM CHLORIDE, CALCIUM CHLORIDE 600; 310; 30; 20 MG/100ML; MG/100ML; MG/100ML; MG/100ML
INJECTION, SOLUTION INTRAVENOUS CONTINUOUS
Status: DISCONTINUED | OUTPATIENT
Start: 2019-12-27 | End: 2019-12-27 | Stop reason: HOSPADM

## 2019-12-27 RX ORDER — OXYCODONE HYDROCHLORIDE AND ACETAMINOPHEN 5; 325 MG/1; MG/1
1 TABLET ORAL PRN
Status: DISCONTINUED | OUTPATIENT
Start: 2019-12-27 | End: 2019-12-27 | Stop reason: HOSPADM

## 2019-12-27 RX ORDER — ONDANSETRON 2 MG/ML
4 INJECTION INTRAMUSCULAR; INTRAVENOUS EVERY 30 MIN PRN
Status: DISCONTINUED | OUTPATIENT
Start: 2019-12-27 | End: 2019-12-27 | Stop reason: HOSPADM

## 2019-12-27 RX ORDER — HYDRALAZINE HYDROCHLORIDE 20 MG/ML
5 INJECTION INTRAMUSCULAR; INTRAVENOUS EVERY 30 MIN PRN
Status: DISCONTINUED | OUTPATIENT
Start: 2019-12-27 | End: 2019-12-27 | Stop reason: HOSPADM

## 2019-12-27 RX ORDER — SODIUM CHLORIDE 0.9 % (FLUSH) 0.9 %
10 SYRINGE (ML) INJECTION EVERY 12 HOURS SCHEDULED
Status: DISCONTINUED | OUTPATIENT
Start: 2019-12-27 | End: 2019-12-27 | Stop reason: HOSPADM

## 2019-12-27 RX ORDER — LABETALOL HYDROCHLORIDE 5 MG/ML
5 INJECTION, SOLUTION INTRAVENOUS
Status: DISCONTINUED | OUTPATIENT
Start: 2019-12-27 | End: 2019-12-27 | Stop reason: HOSPADM

## 2019-12-27 RX ORDER — LIDOCAINE HYDROCHLORIDE 20 MG/ML
INJECTION, SOLUTION INFILTRATION; PERINEURAL PRN
Status: DISCONTINUED | OUTPATIENT
Start: 2019-12-27 | End: 2019-12-27 | Stop reason: SDUPTHER

## 2019-12-27 RX ORDER — ONDANSETRON 2 MG/ML
INJECTION INTRAMUSCULAR; INTRAVENOUS PRN
Status: DISCONTINUED | OUTPATIENT
Start: 2019-12-27 | End: 2019-12-27 | Stop reason: SDUPTHER

## 2019-12-27 RX ORDER — SODIUM CHLORIDE, SODIUM LACTATE, POTASSIUM CHLORIDE, CALCIUM CHLORIDE 600; 310; 30; 20 MG/100ML; MG/100ML; MG/100ML; MG/100ML
INJECTION, SOLUTION INTRAVENOUS CONTINUOUS PRN
Status: DISCONTINUED | OUTPATIENT
Start: 2019-12-27 | End: 2019-12-27 | Stop reason: SDUPTHER

## 2019-12-27 RX ORDER — DIPHENHYDRAMINE HYDROCHLORIDE 50 MG/ML
6.25 INJECTION INTRAMUSCULAR; INTRAVENOUS
Status: DISCONTINUED | OUTPATIENT
Start: 2019-12-27 | End: 2019-12-27 | Stop reason: HOSPADM

## 2019-12-27 RX ORDER — OXYCODONE HYDROCHLORIDE AND ACETAMINOPHEN 5; 325 MG/1; MG/1
2 TABLET ORAL PRN
Status: DISCONTINUED | OUTPATIENT
Start: 2019-12-27 | End: 2019-12-27 | Stop reason: HOSPADM

## 2019-12-27 RX ORDER — MEPERIDINE HYDROCHLORIDE 50 MG/ML
12.5 INJECTION INTRAMUSCULAR; INTRAVENOUS; SUBCUTANEOUS EVERY 5 MIN PRN
Status: DISCONTINUED | OUTPATIENT
Start: 2019-12-27 | End: 2019-12-27 | Stop reason: HOSPADM

## 2019-12-27 RX ADMIN — LIDOCAINE HYDROCHLORIDE 80 MG: 20 INJECTION, SOLUTION INFILTRATION; PERINEURAL at 10:27

## 2019-12-27 RX ADMIN — PROPOFOL 25 MG: 10 INJECTION, EMULSION INTRAVENOUS at 10:36

## 2019-12-27 RX ADMIN — PROPOFOL 25 MG: 10 INJECTION, EMULSION INTRAVENOUS at 10:32

## 2019-12-27 RX ADMIN — SODIUM CHLORIDE, POTASSIUM CHLORIDE, SODIUM LACTATE AND CALCIUM CHLORIDE: 600; 310; 30; 20 INJECTION, SOLUTION INTRAVENOUS at 10:23

## 2019-12-27 RX ADMIN — PROPOFOL 100 MG: 10 INJECTION, EMULSION INTRAVENOUS at 10:27

## 2019-12-27 RX ADMIN — ONDANSETRON 4 MG: 2 INJECTION INTRAMUSCULAR; INTRAVENOUS at 10:27

## 2019-12-27 RX ADMIN — SODIUM CHLORIDE, POTASSIUM CHLORIDE, SODIUM LACTATE AND CALCIUM CHLORIDE: 600; 310; 30; 20 INJECTION, SOLUTION INTRAVENOUS at 08:33

## 2019-12-27 ASSESSMENT — PULMONARY FUNCTION TESTS
PIF_VALUE: 1
PIF_VALUE: 0
PIF_VALUE: 1
PIF_VALUE: 0
PIF_VALUE: 1
PIF_VALUE: 0
PIF_VALUE: 1

## 2019-12-27 ASSESSMENT — PAIN SCALES - GENERAL: PAINLEVEL_OUTOF10: 0

## 2019-12-30 DIAGNOSIS — M33.20 POLYMYOSITIS (HCC): ICD-10-CM

## 2019-12-30 DIAGNOSIS — Z79.899 HIGH RISK MEDICATION USE: ICD-10-CM

## 2019-12-30 LAB
ALBUMIN SERPL-MCNC: 4.4 G/DL (ref 3.4–5)
ALP BLD-CCNC: 67 U/L (ref 40–129)
ALT SERPL-CCNC: 17 U/L (ref 10–40)
AST SERPL-CCNC: 27 U/L (ref 15–37)
BASOPHILS ABSOLUTE: 0 K/UL (ref 0–0.2)
BASOPHILS RELATIVE PERCENT: 0.3 %
BILIRUB SERPL-MCNC: 0.5 MG/DL (ref 0–1)
BILIRUBIN DIRECT: <0.2 MG/DL (ref 0–0.3)
BILIRUBIN, INDIRECT: NORMAL MG/DL (ref 0–1)
C-REACTIVE PROTEIN: 1.5 MG/L (ref 0–5.1)
CREAT SERPL-MCNC: 0.6 MG/DL (ref 0.6–1.1)
EOSINOPHILS ABSOLUTE: 0 K/UL (ref 0–0.6)
EOSINOPHILS RELATIVE PERCENT: 0.5 %
GFR AFRICAN AMERICAN: >60
GFR NON-AFRICAN AMERICAN: >60
HCT VFR BLD CALC: 41.5 % (ref 36–48)
HEMOGLOBIN: 14.2 G/DL (ref 12–16)
LYMPHOCYTES ABSOLUTE: 0.6 K/UL (ref 1–5.1)
LYMPHOCYTES RELATIVE PERCENT: 7.4 %
MCH RBC QN AUTO: 32.2 PG (ref 26–34)
MCHC RBC AUTO-ENTMCNC: 34.1 G/DL (ref 31–36)
MCV RBC AUTO: 94.3 FL (ref 80–100)
MONOCYTES ABSOLUTE: 0.2 K/UL (ref 0–1.3)
MONOCYTES RELATIVE PERCENT: 3.2 %
NEUTROPHILS ABSOLUTE: 6.8 K/UL (ref 1.7–7.7)
NEUTROPHILS RELATIVE PERCENT: 88.6 %
PDW BLD-RTO: 14.2 % (ref 12.4–15.4)
PLATELET # BLD: 360 K/UL (ref 135–450)
PMV BLD AUTO: 8.2 FL (ref 5–10.5)
RBC # BLD: 4.4 M/UL (ref 4–5.2)
SEDIMENTATION RATE, ERYTHROCYTE: 14 MM/HR (ref 0–30)
TOTAL CK: 363 U/L (ref 26–192)
TOTAL PROTEIN: 6.8 G/DL (ref 6.4–8.2)
WBC # BLD: 7.6 K/UL (ref 4–11)

## 2020-01-01 LAB — ALDOLASE: 9.5 U/L (ref 1.5–8.1)

## 2020-01-06 ENCOUNTER — OFFICE VISIT (OUTPATIENT)
Dept: ORTHOPEDIC SURGERY | Age: 55
End: 2020-01-06

## 2020-01-06 VITALS — BODY MASS INDEX: 29.26 KG/M2 | HEIGHT: 60 IN | WEIGHT: 149.03 LBS

## 2020-01-06 PROCEDURE — 99024 POSTOP FOLLOW-UP VISIT: CPT | Performed by: ORTHOPAEDIC SURGERY

## 2020-01-06 NOTE — PROGRESS NOTES
The patient is doing overall quite well after surgery. The wound is healing without signs of infection or dehiscence. There is satisfactory range of motion of the fingers. Doing well after left carpal tunnel release. We will plan for suture removal, and discuss appropriate wound care. Activities may be advanced depending upon comfort. Follow-up will be depending upon range of symptoms over the next several weeks. No orders of the defined types were placed in this encounter.

## 2020-01-28 ENCOUNTER — OFFICE VISIT (OUTPATIENT)
Dept: INTERNAL MEDICINE CLINIC | Age: 55
End: 2020-01-28
Payer: COMMERCIAL

## 2020-01-28 VITALS
HEART RATE: 71 BPM | DIASTOLIC BLOOD PRESSURE: 72 MMHG | BODY MASS INDEX: 29.1 KG/M2 | WEIGHT: 149 LBS | SYSTOLIC BLOOD PRESSURE: 110 MMHG | OXYGEN SATURATION: 95 %

## 2020-01-28 PROCEDURE — 99213 OFFICE O/P EST LOW 20 MIN: CPT | Performed by: NURSE PRACTITIONER

## 2020-01-28 ASSESSMENT — PATIENT HEALTH QUESTIONNAIRE - PHQ9
SUM OF ALL RESPONSES TO PHQ QUESTIONS 1-9: 0
SUM OF ALL RESPONSES TO PHQ9 QUESTIONS 1 & 2: 0
SUM OF ALL RESPONSES TO PHQ QUESTIONS 1-9: 0
2. FEELING DOWN, DEPRESSED OR HOPELESS: 0
1. LITTLE INTEREST OR PLEASURE IN DOING THINGS: 0

## 2020-01-29 ASSESSMENT — ENCOUNTER SYMPTOMS
VOMITING: 0
SHORTNESS OF BREATH: 0
RHINORRHEA: 0
SINUS PRESSURE: 0
CHEST TIGHTNESS: 0
ABDOMINAL PAIN: 0
EYE ITCHING: 0
SORE THROAT: 0
EYE REDNESS: 0
DIARRHEA: 0
COUGH: 0
BLOOD IN STOOL: 0
BACK PAIN: 0
COLOR CHANGE: 0
WHEEZING: 0
CONSTIPATION: 0
NAUSEA: 0

## 2020-01-29 NOTE — ASSESSMENT & PLAN NOTE
She has tried ketoconazole shampoo with no improvement  She has very thick hair, so wonder if there is enough penetration of ketoconazole to the area  Refer out to derm.

## 2020-01-29 NOTE — ASSESSMENT & PLAN NOTE
She has used multiple steroidal topicals and shampoos without improvement  At this time, will refer to derm for recommendations for long term maintenance of rash.

## 2020-01-29 NOTE — PROGRESS NOTES
Subjective:      Patient ID: Ana Maria Evans is a 47 y.o. female. Chief Complaint   Patient presents with    Other     f/up        HPI  Linda is in the office today with concerns about her skin. She has areas of redness and dry scaly plaques all over her body. Worse on her scalp. She has redness to bilateral hands and plaques to bilateral elbows. She was previously treated with oral steroids, and this improved symptoms, but once the steroids were complete, the rashes came right back. She has not seen derm yet. She does follow with Dr. Rebecca Nixon. Review of Systems   Constitutional: Negative for chills, fatigue and fever. HENT: Negative for congestion, ear pain, postnasal drip, rhinorrhea, sinus pressure, sneezing and sore throat. Eyes: Negative for redness and itching. Respiratory: Negative for cough, chest tightness, shortness of breath and wheezing. Cardiovascular: Negative for chest pain and palpitations. Gastrointestinal: Negative for abdominal pain, blood in stool, constipation, diarrhea, nausea and vomiting. Endocrine: Negative for cold intolerance and heat intolerance. Genitourinary: Negative for difficulty urinating, dysuria, flank pain, frequency, hematuria and urgency. Musculoskeletal: Negative for arthralgias, back pain, joint swelling and myalgias. Skin: Positive for rash (bilateral hands, elbows, and scalp). Negative for color change, pallor and wound. Allergic/Immunologic: Negative for environmental allergies and food allergies. Neurological: Negative for dizziness, seizures, syncope, weakness, light-headedness, numbness and headaches. Hematological: Negative for adenopathy. Does not bruise/bleed easily. Psychiatric/Behavioral: Negative for confusion, sleep disturbance and suicidal ideas. The patient is not nervous/anxious and is not hyperactive. Objective:   Physical Exam  Constitutional:       Appearance: She is well-developed.    HENT:      Right Ear: Hearing,

## 2020-02-03 RX ORDER — AZATHIOPRINE 50 MG/1
TABLET ORAL
Qty: 90 TABLET | Refills: 2 | Status: SHIPPED | OUTPATIENT
Start: 2020-02-03 | End: 2021-03-23

## 2020-02-19 ENCOUNTER — OFFICE VISIT (OUTPATIENT)
Dept: DERMATOLOGY | Age: 55
End: 2020-02-19
Payer: COMMERCIAL

## 2020-02-19 ENCOUNTER — OFFICE VISIT (OUTPATIENT)
Dept: RHEUMATOLOGY | Age: 55
End: 2020-02-19
Payer: COMMERCIAL

## 2020-02-19 VITALS
WEIGHT: 147 LBS | HEIGHT: 60 IN | SYSTOLIC BLOOD PRESSURE: 120 MMHG | BODY MASS INDEX: 28.86 KG/M2 | DIASTOLIC BLOOD PRESSURE: 76 MMHG

## 2020-02-19 LAB
ALT SERPL-CCNC: 47 U/L (ref 10–40)
AST SERPL-CCNC: 61 U/L (ref 15–37)
BASOPHILS ABSOLUTE: 0 K/UL (ref 0–0.2)
BASOPHILS RELATIVE PERCENT: 0.7 %
CREAT SERPL-MCNC: 0.5 MG/DL (ref 0.6–1.1)
EOSINOPHILS ABSOLUTE: 0.2 K/UL (ref 0–0.6)
EOSINOPHILS RELATIVE PERCENT: 3.1 %
GFR AFRICAN AMERICAN: >60
GFR NON-AFRICAN AMERICAN: >60
HCT VFR BLD CALC: 39.4 % (ref 36–48)
HEMOGLOBIN: 13.1 G/DL (ref 12–16)
LYMPHOCYTES ABSOLUTE: 0.8 K/UL (ref 1–5.1)
LYMPHOCYTES RELATIVE PERCENT: 13.2 %
MCH RBC QN AUTO: 31.4 PG (ref 26–34)
MCHC RBC AUTO-ENTMCNC: 33.2 G/DL (ref 31–36)
MCV RBC AUTO: 94.5 FL (ref 80–100)
MONOCYTES ABSOLUTE: 0.4 K/UL (ref 0–1.3)
MONOCYTES RELATIVE PERCENT: 6.6 %
NEUTROPHILS ABSOLUTE: 4.6 K/UL (ref 1.7–7.7)
NEUTROPHILS RELATIVE PERCENT: 76.4 %
PDW BLD-RTO: 14.2 % (ref 12.4–15.4)
PLATELET # BLD: 352 K/UL (ref 135–450)
PMV BLD AUTO: 8.6 FL (ref 5–10.5)
RBC # BLD: 4.18 M/UL (ref 4–5.2)
SEDIMENTATION RATE, ERYTHROCYTE: 14 MM/HR (ref 0–30)
TOTAL CK: 1794 U/L (ref 26–192)
WBC # BLD: 6.1 K/UL (ref 4–11)

## 2020-02-19 PROCEDURE — 11103 TANGNTL BX SKIN EA SEP/ADDL: CPT | Performed by: DERMATOLOGY

## 2020-02-19 PROCEDURE — 11104 PUNCH BX SKIN SINGLE LESION: CPT | Performed by: DERMATOLOGY

## 2020-02-19 PROCEDURE — 99204 OFFICE O/P NEW MOD 45 MIN: CPT | Performed by: DERMATOLOGY

## 2020-02-19 PROCEDURE — 81003 URINALYSIS AUTO W/O SCOPE: CPT | Performed by: INTERNAL MEDICINE

## 2020-02-19 PROCEDURE — 99214 OFFICE O/P EST MOD 30 MIN: CPT | Performed by: INTERNAL MEDICINE

## 2020-02-19 RX ORDER — CLOBETASOL PROPIONATE 0.5 MG/G
OINTMENT TOPICAL
Qty: 60 G | Refills: 2 | Status: SHIPPED | OUTPATIENT
Start: 2020-02-19 | End: 2022-09-07 | Stop reason: ALTCHOICE

## 2020-02-19 RX ORDER — PREDNISONE 10 MG/1
TABLET ORAL
Qty: 90 TABLET | Refills: 0 | Status: SHIPPED | OUTPATIENT
Start: 2020-02-19 | End: 2020-06-17

## 2020-02-19 RX ORDER — AZATHIOPRINE 50 MG/1
TABLET ORAL
Qty: 360 TABLET | Refills: 0 | Status: SHIPPED | OUTPATIENT
Start: 2020-02-19 | End: 2020-06-17

## 2020-02-19 RX ORDER — TRIAMCINOLONE ACETONIDE 0.25 MG/G
CREAM TOPICAL
Qty: 80 G | Refills: 2 | Status: SHIPPED | OUTPATIENT
Start: 2020-02-19 | End: 2022-09-07

## 2020-02-19 NOTE — PATIENT INSTRUCTIONS
Biopsy Wound Care Instructions     Keep the bandage in place for 24 hours.  Cleanse the wound with mild soapy water daily   Gently dry the area.  Apply Vaseline or petroleum jelly to the wound using a cotton tipped applicator.  Cover with a clean bandage.  Repeat this process until the biopsy site is healed.  If you had stitches placed, continue treating the site until the stitches are removed. Remember to make an appointment to return to have your stitches removed by our staff.  You may shower and bathe as usual.     ** Biopsy results generally take around 7 business days to come back. If you have not heard from us by then, please call the office at [phone] between 8AM and 4PM Monday through Friday.

## 2020-02-19 NOTE — PROGRESS NOTES
[address] [facility], [place]                                                                                                                         [phone] (C) [phone] (F)      Dear Dr. Emile Cabrera, APRN - CNP:  Please find Rheumatology assessment. Thank you for giving me the opportunity to be involved in Linda Hays's care and I look forward following Linda along with you. If you have any questions or concerns please feel free to reach me. Note is transcribed using voice recognition software. Inadvertent computerized transcription errors may be present. Patient identification: Mary Hays,: ,67 y.o. Sex: female     A/P  Linda was seen today for follow-up. 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 -     predniSONE (DELTASONE) 10 MG tablet; Take 2 tab po daily x 7 days, 1.5 mg po daily x 7 days, then 1 tab po daily. 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 A/P from Today's visit-  1.   Polymyositis-anti-Deidra syndrome-  Symptomatic after she stopped prednisone in mid 2019-has mechanics hand, scattered scaly rash in her forearms, left anterior lateral abdominal wall, and diffused fine scaly lesions in the scalp which looks more like a seborrheic keratitis versus psoriasis. Noticing muscle weakness and pain mainly in her arm muscles, not much in the lower extremities. CK -trending up, had normalized. Is taking 150 mg of Imuran a day. Normal ADLs and recreational activities. TPMT activity is mildly  Low ( 22.2, normal is 24-44). PFT normal-DLCO, mild restrictive defect though to be from poor chest wall effort. Long [place] Up-to-date with age-specific cancer screening. Plan-  We will check baseline labs today. Increase Imuran 200 mg a day, start 20 mg of prednisone taper. Recheck labs in 4 to 6 weeks. She is also seeing Derm this afternoon, she was advised to call me with worsening symptoms, otherwise follow-up in 4 to 6 weeks. If remains symptomatic then, will plan to add low-dose tacrolimus vs Plaquenil. She is aware of myositis emergencies, doing much better now, do not anticipate any. Patient indicates understanding and agrees with the management plan. I reviewed patient's history, referral documents and electronic medical records. #######################################################################    Subjective-  Follow-up for polymyositis. Interval Chanda Brown has predominant skin manifestations after she stopped prednisone in late December 2019. Dry scaly and cracking fingertips, palms and dorsum of her hand, fine scaly lesions in her forearm, elbows, left anterior abdominal wall, as well as  erythema and scales in her scalp. She is also noticing muscle soreness stiffness and discomfort in her arm muscles. Finger joints are getting tight, achy and stiff. Overall feels poorly. Was doing pretty good when she was taking low-dose prednisone with Imuran. She denies any shortness of breath, exertional dyspnea. Has pretty good exercise tolerance, has been working out despite of skin manifestations and muscle pain. She is very compliant with azathioprine.    Denies any muscle weakness, dysphagia, difficulty. Neck flexion extension are normal.  Distal muscles in her finger joints and feet normal.   Extraocular movements are normal.    Skin:         Dry  looking hand and fingers. Prominent cuticular changes erythema and induration and subjective discomfort. Fine scaly erythematous lesions in her forearm, elbows, left anterior/lateral  abdominal wall. Scalp is full of scales and erythema. Fine scales. No evidence ischemia or deformities noted in digits or nails. Mechanics hand and feet. Chest: Normal effort, clear to auscultation. Heart, S1-S2 regular no edema. DATA:   Lab Results   Component Value Date    WBC 7.6 12/30/2019    HGB 14.2 12/30/2019    HCT 41.5 12/30/2019    MCV 94.3 12/30/2019     12/30/2019         Chemistry        Component Value Date/Time     08/16/2019 0951    K 3.9 08/16/2019 0951     08/16/2019 0951    CO2 29 08/16/2019 0951    BUN 14 08/16/2019 0951    CREATININE 0.6 12/30/2019 1354        Component Value Date/Time    CALCIUM 9.3 08/16/2019 0951    ALKPHOS 67 12/30/2019 1354    AST 27 12/30/2019 1354    ALT 17 12/30/2019 1354    BILITOT 0.5 12/30/2019 1354          No results found for: [facility]  Lab Results   Component Value Date    SEDRATE 14 12/30/2019     Lab Results   Component Value Date    CRP 1.5 12/30/2019     Lab Results   Component Value Date    MICHELLE POSITIVE (A) 06/12/2019    SEDRATE 14 12/30/2019     Lab Results   Component Value Date    UDZFJQQ 224 (H) 12/30/2019     Lab Results   Component Value Date    TSH 2.40 07/26/2018     Lab Results   Component Value Date    VITD25 28.2 (L) 09/16/2019         Radiology Review:    7/9/19-   IMPRESSION:  Mild restrictive ventilatory defect. Finding may be  consistent with neuromuscular chest wall, pleural, or parenchymal  disorder, including mild obesity.     A/P- See above.

## 2020-02-19 NOTE — PROGRESS NOTES
[address] [facility], [facility]    Previous clinic visit:  none    CC:  rash    HPI:    1.)  Here today with many mo h/o worsening scaly eruption located on photoexposed areas of arms, chest, back and on lateral thighs. Has also had a several year h/o scaly itchy rash on scalp that has proven historically difficult to tx with topical cs. Of note, patient is currently established with Dr. Cale Katz and is being treated for/worked up for antisynthetase syndrome and polymyositis. Has been tx with prednisone and imuran but given flaring of disease and high myositis markers, the plan is to likey change immunosuppressant strategy. DERM HISTORY:   Personal history of NMSC or MM- no    Family history of NMSC or MM- no   Sunburns easily- Yes   Uses sunscreen- Yes  History of tanning bed use- No    ADDITIONAL HISTORY:    I have reviewed past medical and surgical histories, current medications, allergies, social and family histories as documented in the patient's electronic medical record. Relevant additional history: ___     Current Outpatient Medications   Medication Sig Dispense Refill    predniSONE (DELTASONE) 10 MG tablet Take 2 tab po daily x 7 days, 1.5 mg po daily x 7 days, then 1 tab po daily. 90 tablet 0    ibuprofen (ADVIL;MOTRIN) 600 MG tablet Take 1 tablet by mouth every 6 hours as needed for Pain 60 tablet 1    levothyroxine (SYNTHROID) 100 MCG tablet 1 tab daily 90 tablet 0    PARoxetine (PAXIL) 10 MG tablet Take 1 tablet by mouth every morning 90 tablet 1    ALBUTEROL SULFATE IN Inhale into the lungs as needed      predniSONE (DELTASONE) 10 MG tablet 2 tab po daily x 15 days. 1.5 tab po daily x 15 days,thereafter take 1 tab po daily. 120 tablet 1    azaTHIOprine (IMURAN) 50 MG tablet Take 4 tab po once a week. (Patient not taking: Reported on 2/19/2020) 360 tablet 0    azaTHIOprine (IMURAN) 50 MG tablet Take 3 tab po daily.  (Patient not taking: Reported on 2/19/2020) 90 tablet

## 2020-02-20 ENCOUNTER — TELEPHONE (OUTPATIENT)
Dept: INTERNAL MEDICINE CLINIC | Age: 55
End: 2020-02-20

## 2020-02-20 LAB
BACTERIA: ABNORMAL /HPF
BILIRUBIN URINE: NEGATIVE
BLOOD, URINE: NEGATIVE
CLARITY: ABNORMAL
COLOR: YELLOW
EPITHELIAL CELLS, UA: 7 /HPF (ref 0–5)
GLUCOSE URINE: NEGATIVE MG/DL
HYALINE CASTS: 6 /LPF (ref 0–8)
KETONES, URINE: NEGATIVE MG/DL
LEUKOCYTE ESTERASE, URINE: ABNORMAL
MICROSCOPIC EXAMINATION: YES
NITRITE, URINE: NEGATIVE
PH UA: 6 (ref 5–8)
PROTEIN UA: NEGATIVE MG/DL
RBC UA: 1 /HPF (ref 0–4)
SPECIFIC GRAVITY UA: 1.01 (ref 1–1.03)
URINE TYPE: ABNORMAL
UROBILINOGEN, URINE: 1 E.U./DL
WBC UA: 3 /HPF (ref 0–5)

## 2020-02-21 ENCOUNTER — TELEPHONE (OUTPATIENT)
Dept: DERMATOLOGY | Age: 55
End: 2020-02-21

## 2020-02-21 LAB — ALDOLASE: 37.9 U/L (ref 1.5–8.1)

## 2020-02-21 RX ORDER — MYCOPHENOLATE MOFETIL 500 MG/1
TABLET ORAL
Qty: 60 TABLET | Refills: 1 | Status: SHIPPED | OUTPATIENT
Start: 2020-02-21 | End: 2020-06-17

## 2020-02-21 NOTE — TELEPHONE ENCOUNTER
----- Message from Wendie Almanzar MD sent at 2/20/2020  4:00 PM EST -----  Domenica Parks,  Can you update patient that Dr. Cale Katz, her rheum, is chewing on next steps for her systemic therapy and hence asks we hold on otezla for now.  Will update with biopsy results when available    VR

## 2020-02-21 NOTE — PROGRESS NOTES
Spoke with pt, active PM, start on Cellcept 1 tab BID ( start with 1 a day x 1 week, then BID)- decrease Imuran 150 mg daily, stay on 20 mg prednisone, Labs in 3 weeks. Risk, benefits, directions explained. patient showed understanding.

## 2020-02-26 LAB — DERMATOLOGY PATHOLOGY REPORT: NORMAL

## 2020-02-27 RX ORDER — LEVOTHYROXINE SODIUM 0.1 MG/1
TABLET ORAL
Qty: 90 TABLET | Refills: 0 | Status: SHIPPED | OUTPATIENT
Start: 2020-02-27 | End: 2020-06-14 | Stop reason: SDUPTHER

## 2020-03-05 ENCOUNTER — OFFICE VISIT (OUTPATIENT)
Dept: INTERNAL MEDICINE CLINIC | Age: 55
End: 2020-03-05

## 2020-03-05 NOTE — PROGRESS NOTES
 Chronic seasonal allergic rhinitis due to pollen    Bronchitis    Bursitis of left shoulder    Swelling of both hands    Right carpal tunnel syndrome    Left carpal tunnel syndrome       Current Outpatient Medications   Medication Sig Dispense Refill    levothyroxine (SYNTHROID) 100 MCG tablet 1 tab daily 90 tablet 0    mycophenolate (CELLCEPT) 500 MG tablet Take 1 tab po BID 60 tablet 1    azaTHIOprine (IMURAN) 50 MG tablet Take 4 tab po once a week. (Patient not taking: Reported on 2/19/2020) 360 tablet 0    predniSONE (DELTASONE) 10 MG tablet Take 2 tab po daily x 7 days, 1.5 mg po daily x 7 days, then 1 tab po daily. 90 tablet 0    clobetasol (TEMOVATE) 0.05 % ointment Apply to affected area twice daily on body and hands 60 g 2    triamcinolone (KENALOG) 0.025 % cream Apply to affected area on face twice daily 80 g 2    azaTHIOprine (IMURAN) 50 MG tablet Take 3 tab po daily. (Patient not taking: Reported on 2/19/2020) 90 tablet 2    ibuprofen (ADVIL;MOTRIN) 600 MG tablet Take 1 tablet by mouth every 6 hours as needed for Pain 60 tablet 1    PARoxetine (PAXIL) 10 MG tablet Take 1 tablet by mouth every morning 90 tablet 1    ALBUTEROL SULFATE IN Inhale into the lungs as needed      predniSONE (DELTASONE) 10 MG tablet 2 tab po daily x 15 days. 1.5 tab po daily x 15 days,thereafter take 1 tab po daily. 120 tablet 1     No current facility-administered medications for this visit.           NUTRITION ASSESSMENT    Biochemical Data, Medical Tests and Procedures:    Lab Results   Component Value Date    LABA1C 5.5 09/16/2019     Lab Results   Component Value Date    .2 09/16/2019       Lab Results   Component Value Date    CHOL 166 07/29/2017    CHOL 159 08/27/2016    CHOL 152 08/29/2015     Lab Results   Component Value Date    TRIG 105 07/29/2017    TRIG 90 08/27/2016    TRIG 84 08/29/2015     Lab Results   Component Value Date    HDL 74 (H) 09/16/2019    HDL 60 08/16/2019    HDL 50 09/01/2018     Lab Results   Component Value Date    LDLCALC 88 09/16/2019    LDLCALC 88 08/16/2019    LDLCALC 88 09/01/2018     Lab Results   Component Value Date    LABVLDL 26 09/16/2019    LABVLDL 44 08/16/2019    LABVLDL 30 09/01/2018     No results found for: CHOLHDLRATIO    Lab Results   Component Value Date    WBC 6.1 02/19/2020    HGB 13.1 02/19/2020    HCT 39.4 02/19/2020    MCV 94.5 02/19/2020     02/19/2020     Lab Results   Component Value Date    CREATININE 0.5 (L) 02/19/2020    BUN 14 08/16/2019     08/16/2019    K 3.9 08/16/2019     08/16/2019    CO2 29 08/16/2019       Anthropometric Measurements: Wt Readings from Last 3 Encounters:   02/19/20 147 lb (66.7 kg)   01/28/20 149 lb (67.6 kg)   01/06/20 149 lb 0.5 oz (67.6 kg)      BMI Readings from Last 3 Encounters:   02/19/20 28.71 kg/m²   01/28/20 29.10 kg/m²   01/06/20 29.11 kg/m²   Usual adult weight 115 until menopause  Patient's stated goal weight: \"size 4 adelaide--I don't have a clue what that weight is\"  7% Weight loss goal weight: 137 lb    Physical Activity Assessment:  Physical activity: Jazzercise (and 18 minutes on Recumbent bike 5 days per week)  Frequency of activity: 2/week (Monday/Wednesday)  Duration of activity: 60 minutes  Obstacles to activity: winter weather     Sleep: less than 7 hours nightly    Works 7:30 - 4:00  Sitting at desk in [employer] and Nutrition History:   Usual beverages: Crystal Lite at home (gave up soda 4 years ago), coffee with 3 spoons sugar and Fijian vanilla FF creamer  Alcohol consumption: none  Frequency of meals away from home: 2 - 3 per week (Friday evenings with Mom, Saturday lunch, Tuesday take-out)  Number of people in household: 2 (shares shopping;  does 90% of cooking)  Food Availability Problems:   Within the past 12 months, have you worried that your food would run out before you got money to buy more? No  Within the past 12 months, has the food you bought not lasted till the end of the month and you didn't have money to get more?  No    Usual meals:   FOOD RECALL  Up at 5:45 am  First meal--Usual time: 8:30 at work  Today: Orange pineapple glass (10 - 12 oz ) and banana and 2 slice raisin bread   Different Day: skim mill with low-fat chocolate syrup with pop tarts or muffins or brownies or cereal bar    Snack  nond    Second meal--Usual time: 1:30p  Recent: yogurt (Carb master)  Different Day: Regular yogurt    Snack  4:30 - 5p At home before Jazzercise: crystal lite and pretzels    Third meal--Usual time: 8 pm on Monday/Wednesday; 5pm other day  Recent: Salad from bag, with cottage cheese, a few chips  Different Day: Meatloaf, green beans, corn  Fridays: [employer] (1/2 price pizza), [employer] (lettuce wraps or tempura green beans), food court in [address]          Follow Up: declined    Referring Provider: REGINA Aleman CNP  Time spent with patient: 90 minutes

## 2020-03-06 ENCOUNTER — TELEPHONE (OUTPATIENT)
Dept: DERMATOLOGY | Age: 55
End: 2020-03-06

## 2020-03-06 NOTE — TELEPHONE ENCOUNTER
----- Message from Bertin Barreto MD sent at 3/4/2020  7:52 AM EST -----  Both biopsies revealed dermatitis. No evidence of usual histologic pattern of a vacuolar interface dermatitis such as DM. Treatment with topical cs. Will defer to rheum re: treatment of her underlying myositis.

## 2020-03-18 ENCOUNTER — OFFICE VISIT (OUTPATIENT)
Dept: RHEUMATOLOGY | Age: 55
End: 2020-03-18
Payer: COMMERCIAL

## 2020-03-18 VITALS — HEIGHT: 60 IN | BODY MASS INDEX: 28.27 KG/M2 | WEIGHT: 144 LBS

## 2020-03-18 LAB
A/G RATIO: 1.8 (ref 1.1–2.2)
ALBUMIN SERPL-MCNC: 4.2 G/DL (ref 3.4–5)
ALP BLD-CCNC: 64 U/L (ref 40–129)
ALT SERPL-CCNC: 50 U/L (ref 10–40)
ANION GAP SERPL CALCULATED.3IONS-SCNC: 13 MMOL/L (ref 3–16)
AST SERPL-CCNC: 50 U/L (ref 15–37)
BASOPHILS ABSOLUTE: 0 K/UL (ref 0–0.2)
BASOPHILS RELATIVE PERCENT: 0.2 %
BILIRUB SERPL-MCNC: 0.5 MG/DL (ref 0–1)
BILIRUBIN URINE: NEGATIVE
BLOOD, URINE: NEGATIVE
BUN BLDV-MCNC: 13 MG/DL (ref 7–20)
C-REACTIVE PROTEIN: 1.6 MG/L (ref 0–5.1)
CALCIUM SERPL-MCNC: 9.5 MG/DL (ref 8.3–10.6)
CHLORIDE BLD-SCNC: 101 MMOL/L (ref 99–110)
CLARITY: CLEAR
CO2: 26 MMOL/L (ref 21–32)
COLOR: YELLOW
CREAT SERPL-MCNC: 0.6 MG/DL (ref 0.6–1.1)
EOSINOPHILS ABSOLUTE: 0 K/UL (ref 0–0.6)
EOSINOPHILS RELATIVE PERCENT: 0 %
GFR AFRICAN AMERICAN: >60
GFR NON-AFRICAN AMERICAN: >60
GLOBULIN: 2.4 G/DL
GLUCOSE BLD-MCNC: 127 MG/DL (ref 70–99)
GLUCOSE URINE: NEGATIVE MG/DL
HCT VFR BLD CALC: 43.2 % (ref 36–48)
HEMOGLOBIN: 14.1 G/DL (ref 12–16)
KETONES, URINE: NEGATIVE MG/DL
LEUKOCYTE ESTERASE, URINE: NEGATIVE
LYMPHOCYTES ABSOLUTE: 0.4 K/UL (ref 1–5.1)
LYMPHOCYTES RELATIVE PERCENT: 5.4 %
MCH RBC QN AUTO: 31.5 PG (ref 26–34)
MCHC RBC AUTO-ENTMCNC: 32.7 G/DL (ref 31–36)
MCV RBC AUTO: 96.6 FL (ref 80–100)
MICROSCOPIC EXAMINATION: NORMAL
MONOCYTES ABSOLUTE: 0.2 K/UL (ref 0–1.3)
MONOCYTES RELATIVE PERCENT: 2.5 %
NEUTROPHILS ABSOLUTE: 7.4 K/UL (ref 1.7–7.7)
NEUTROPHILS RELATIVE PERCENT: 91.9 %
NITRITE, URINE: NEGATIVE
PDW BLD-RTO: 15 % (ref 12.4–15.4)
PH UA: 6.5 (ref 5–8)
PLATELET # BLD: 413 K/UL (ref 135–450)
PMV BLD AUTO: 8.3 FL (ref 5–10.5)
POTASSIUM SERPL-SCNC: 4.5 MMOL/L (ref 3.5–5.1)
PROTEIN UA: NEGATIVE MG/DL
RBC # BLD: 4.47 M/UL (ref 4–5.2)
SODIUM BLD-SCNC: 140 MMOL/L (ref 136–145)
SPECIFIC GRAVITY UA: 1.02 (ref 1–1.03)
TOTAL CK: 762 U/L (ref 26–192)
TOTAL PROTEIN: 6.6 G/DL (ref 6.4–8.2)
URINE TYPE: NORMAL
UROBILINOGEN, URINE: 1 E.U./DL
WBC # BLD: 8 K/UL (ref 4–11)

## 2020-03-18 PROCEDURE — 99214 OFFICE O/P EST MOD 30 MIN: CPT | Performed by: INTERNAL MEDICINE

## 2020-03-18 PROCEDURE — 81003 URINALYSIS AUTO W/O SCOPE: CPT | Performed by: INTERNAL MEDICINE

## 2020-03-18 NOTE — PROGRESS NOTES
 mycophenolate (CELLCEPT) 500 MG tablet Take 1 tab po BID 60 tablet 1    azaTHIOprine (IMURAN) 50 MG tablet Take 4 tab po once a week. 360 tablet 0    predniSONE (DELTASONE) 10 MG tablet Take 2 tab po daily x 7 days, 1.5 mg po daily x 7 days, then 1 tab po daily. 90 tablet 0    clobetasol (TEMOVATE) 0.05 % ointment Apply to affected area twice daily on body and hands 60 g 2    triamcinolone (KENALOG) 0.025 % cream Apply to affected area on face twice daily 80 g 2    azaTHIOprine (IMURAN) 50 MG tablet Take 3 tab po daily. 90 tablet 2    ibuprofen (ADVIL;MOTRIN) 600 MG tablet Take 1 tablet by mouth every 6 hours as needed for Pain 60 tablet 1    PARoxetine (PAXIL) 10 MG tablet Take 1 tablet by mouth every morning 90 tablet 1    ALBUTEROL SULFATE IN Inhale into the lungs as needed      predniSONE (DELTASONE) 10 MG tablet 2 tab po daily x 15 days. 1.5 tab po daily x 15 days,thereafter take 1 tab po daily. 120 tablet 1     No current facility-administered medications for this visit. Allergies   Allergen Reactions    Wasp Venom Other (See Comments)       PHYSICAL EXAM:    Vitals:    Ht 5' (1.524 m)   Wt 144 lb (65.3 kg)   LMP  (Approximate)   BMI 28.12 kg/m²   General appearance/ Psychiatric: well nourished, and well groomed, normal judgement, alert, appears stated age and cooperative. MKS: Normal musculoskeletal examination and upper, lower extremities and spine without any tender, swollen or inflamed joints. Normal muscle strength in upper and lower extremities proximally and distally. Able to get up from squatting position without any difficulty. Neck flexion extension are normal.  Distal muscles in her finger joints and feet normal.   Extraocular movements are normal.        The skin findings are from last visit, has normal skin now other than mild dryness on the tip of her finger pulps. Skin:            Scalp - fine scales and erythema- looks better than last visit.   No evidence ischemia

## 2020-03-19 RX ORDER — PREDNISONE 10 MG/1
TABLET ORAL
Qty: 90 TABLET | Refills: 0 | Status: SHIPPED | OUTPATIENT
Start: 2020-03-19 | End: 2020-06-17

## 2020-03-19 RX ORDER — MYCOPHENOLATE MOFETIL 500 MG/1
TABLET ORAL
Qty: 120 TABLET | Refills: 2 | Status: SHIPPED | OUTPATIENT
Start: 2020-03-19 | End: 2020-04-16 | Stop reason: SDUPTHER

## 2020-03-20 LAB — ALDOLASE: 23.6 U/L (ref 1.5–8.1)

## 2020-04-15 LAB
ALT SERPL-CCNC: 29 U/L (ref 10–40)
AST SERPL-CCNC: 32 U/L (ref 15–37)
BASOPHILS ABSOLUTE: 0 K/UL (ref 0–0.2)
BASOPHILS RELATIVE PERCENT: 0.6 %
C-REACTIVE PROTEIN: 12.9 MG/L (ref 0–5.1)
CREAT SERPL-MCNC: 0.7 MG/DL (ref 0.6–1.1)
EOSINOPHILS ABSOLUTE: 0.1 K/UL (ref 0–0.6)
EOSINOPHILS RELATIVE PERCENT: 1.4 %
GFR AFRICAN AMERICAN: >60
GFR NON-AFRICAN AMERICAN: >60
HCT VFR BLD CALC: 44.5 % (ref 36–48)
HEMOGLOBIN: 14.8 G/DL (ref 12–16)
LYMPHOCYTES ABSOLUTE: 1.7 K/UL (ref 1–5.1)
LYMPHOCYTES RELATIVE PERCENT: 21 %
MCH RBC QN AUTO: 31.5 PG (ref 26–34)
MCHC RBC AUTO-ENTMCNC: 33.1 G/DL (ref 31–36)
MCV RBC AUTO: 95 FL (ref 80–100)
MONOCYTES ABSOLUTE: 0.8 K/UL (ref 0–1.3)
MONOCYTES RELATIVE PERCENT: 9.4 %
NEUTROPHILS ABSOLUTE: 5.4 K/UL (ref 1.7–7.7)
NEUTROPHILS RELATIVE PERCENT: 67.6 %
PDW BLD-RTO: 16 % (ref 12.4–15.4)
PLATELET # BLD: 300 K/UL (ref 135–450)
PMV BLD AUTO: 8.4 FL (ref 5–10.5)
RBC # BLD: 4.69 M/UL (ref 4–5.2)
SEDIMENTATION RATE, ERYTHROCYTE: 15 MM/HR (ref 0–30)
TOTAL CK: 443 U/L (ref 26–192)
WBC # BLD: 8 K/UL (ref 4–11)

## 2020-04-17 LAB — ALDOLASE: 13.8 U/L (ref 1.5–8.1)

## 2020-04-17 RX ORDER — MYCOPHENOLATE MOFETIL 500 MG/1
TABLET ORAL
Qty: 120 TABLET | Refills: 3 | Status: SHIPPED | OUTPATIENT
Start: 2020-04-17 | End: 2020-07-15

## 2020-05-14 ENCOUNTER — VIRTUAL VISIT (OUTPATIENT)
Dept: RHEUMATOLOGY | Age: 55
End: 2020-05-14
Payer: COMMERCIAL

## 2020-05-14 LAB
A/G RATIO: 1.4 (ref 1.1–2.2)
ALBUMIN SERPL-MCNC: 3.8 G/DL (ref 3.4–5)
ALP BLD-CCNC: 76 U/L (ref 40–129)
ALT SERPL-CCNC: 26 U/L (ref 10–40)
ANION GAP SERPL CALCULATED.3IONS-SCNC: 9 MMOL/L (ref 3–16)
AST SERPL-CCNC: 25 U/L (ref 15–37)
BASOPHILS ABSOLUTE: 0 K/UL (ref 0–0.2)
BASOPHILS RELATIVE PERCENT: 0.4 %
BILIRUB SERPL-MCNC: 0.3 MG/DL (ref 0–1)
BUN BLDV-MCNC: 14 MG/DL (ref 7–20)
C-REACTIVE PROTEIN: 7.4 MG/L (ref 0–5.1)
CALCIUM SERPL-MCNC: 9.2 MG/DL (ref 8.3–10.6)
CHLORIDE BLD-SCNC: 102 MMOL/L (ref 99–110)
CO2: 27 MMOL/L (ref 21–32)
CREAT SERPL-MCNC: 0.7 MG/DL (ref 0.6–1.1)
EOSINOPHILS ABSOLUTE: 0.1 K/UL (ref 0–0.6)
EOSINOPHILS RELATIVE PERCENT: 1.3 %
GFR AFRICAN AMERICAN: >60
GFR NON-AFRICAN AMERICAN: >60
GLOBULIN: 2.7 G/DL
GLUCOSE BLD-MCNC: 99 MG/DL (ref 70–99)
HCT VFR BLD CALC: 43.4 % (ref 36–48)
HEMOGLOBIN: 14.3 G/DL (ref 12–16)
LYMPHOCYTES ABSOLUTE: 1.3 K/UL (ref 1–5.1)
LYMPHOCYTES RELATIVE PERCENT: 14.3 %
MCH RBC QN AUTO: 31.5 PG (ref 26–34)
MCHC RBC AUTO-ENTMCNC: 33 G/DL (ref 31–36)
MCV RBC AUTO: 95.4 FL (ref 80–100)
MONOCYTES ABSOLUTE: 0.6 K/UL (ref 0–1.3)
MONOCYTES RELATIVE PERCENT: 6.8 %
NEUTROPHILS ABSOLUTE: 6.9 K/UL (ref 1.7–7.7)
NEUTROPHILS RELATIVE PERCENT: 77.2 %
PDW BLD-RTO: 14.6 % (ref 12.4–15.4)
PLATELET # BLD: 325 K/UL (ref 135–450)
PMV BLD AUTO: 8.5 FL (ref 5–10.5)
POTASSIUM SERPL-SCNC: 4 MMOL/L (ref 3.5–5.1)
RBC # BLD: 4.55 M/UL (ref 4–5.2)
SEDIMENTATION RATE, ERYTHROCYTE: 10 MM/HR (ref 0–30)
SODIUM BLD-SCNC: 138 MMOL/L (ref 136–145)
TOTAL CK: 441 U/L (ref 26–192)
TOTAL PROTEIN: 6.5 G/DL (ref 6.4–8.2)
WBC # BLD: 8.9 K/UL (ref 4–11)

## 2020-05-14 PROCEDURE — 99214 OFFICE O/P EST MOD 30 MIN: CPT | Performed by: INTERNAL MEDICINE

## 2020-05-14 NOTE — PROGRESS NOTES
facial skin         [] Abnormal-            Psychiatric:       [] Normal Affect [] No Hallucinations        [] Abnormal-     Other pertinent observable physical exam findings-     ASSESSMENT/PLAN:  Linad was seen today for follow-up. 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 If remains stable, will cut back on prednisone, continue current dose of CellCept and Imuran. Reassessment in 2 months. Addendeum-  CK is still elevated, might be from rigorous exercises she is doing. Will ask her to cut back on exercises, stay on current meds, ok to reduce prednisone 5 mg daily. No follow-ups on file. Isma Grant is a 47 y.o. female being evaluated by a Virtual Visit (video visit) encounter to address concerns as mentioned above. A caregiver was present when appropriate. Due to this being a TeleHealth encounter (During ITBQX-07 public health emergency), evaluation of the following organ systems was limited: Vitals/Constitutional/EENT/Resp/CV/GI//MS/Neuro/Skin/Heme-Lymph-Imm. Pursuant to the emergency declaration under the [address] [place] [address], [address] [place] [address] authority and the [employer] Act, this Virtual Visit was conducted with patient's (and/or legal guardian's) consent, to reduce the patient's risk of exposure to COVID-19 and provide necessary medical care. The patient (and/or legal guardian) has also been advised to contact this office for worsening conditions or problems, and seek emergency medical treatment and/or call 911 if deemed necessary.      Patient identification was verified at the start of the visit: Yes    Total time spent on this encounter: Not billed by time    Services were provided through a video synchronous

## 2020-05-16 LAB — ALDOLASE: 12.5 U/L (ref 1.5–8.1)

## 2020-05-22 ENCOUNTER — TELEPHONE (OUTPATIENT)
Dept: RHEUMATOLOGY | Age: 55
End: 2020-05-22

## 2020-05-22 NOTE — TELEPHONE ENCOUNTER
Unlikely the med. Take photo of the rash, if does not get better in 2 weeks or so, check with Derm. Do not stop cellcept, it is very imp med for her condition.

## 2020-05-22 NOTE — TELEPHONE ENCOUNTER
Pt was advised to take picture. Will forward once received. Pt also knows to continue taking current medications and how important it is to her treatment.  Pt gave verbal understanding

## 2020-06-05 ENCOUNTER — PATIENT MESSAGE (OUTPATIENT)
Dept: DERMATOLOGY | Age: 55
End: 2020-06-05

## 2020-06-08 NOTE — TELEPHONE ENCOUNTER
From: Elise Hays  To: Trudy Diaz MD  Sent: 6/5/2020 11:56 AM EDT  Subject: Visit Follow-Up Question    Hi. I know it's been a while since my first visit. Can you please review my file and see if I can now be able to take a medication for my scalp & skin issues. Dr. Jennifer Gerardo was trying to regulate my medications. I am most concerned with my scalp. My hands are much better. Thanks for your time.

## 2020-06-15 ENCOUNTER — TELEPHONE (OUTPATIENT)
Dept: DERMATOLOGY | Age: 55
End: 2020-06-15

## 2020-06-15 RX ORDER — LEVOTHYROXINE SODIUM 0.1 MG/1
TABLET ORAL
Qty: 90 TABLET | Refills: 0 | Status: SHIPPED | OUTPATIENT
Start: 2020-06-15 | End: 2020-09-17 | Stop reason: SDUPTHER

## 2020-06-15 NOTE — TELEPHONE ENCOUNTER
Former [facility] patient   Per [employer], pt wants md advice on scalp/skin issues  Per Dr. Lilia Quiros, patient to be scheduled for in office visit.      LVM for patient to return my call to schedule

## 2020-06-17 ENCOUNTER — OFFICE VISIT (OUTPATIENT)
Dept: DERMATOLOGY | Age: 55
End: 2020-06-17
Payer: COMMERCIAL

## 2020-06-17 VITALS — TEMPERATURE: 97.9 F

## 2020-06-17 PROCEDURE — 99215 OFFICE O/P EST HI 40 MIN: CPT | Performed by: DERMATOLOGY

## 2020-06-17 RX ORDER — CLOBETASOL PROPIONATE 0.46 MG/ML
SOLUTION TOPICAL
Qty: 100 ML | Refills: 3 | Status: SHIPPED | OUTPATIENT
Start: 2020-06-17 | End: 2021-08-27

## 2020-06-17 NOTE — PATIENT INSTRUCTIONS
Otezla (apremilast)     What is it? Marine Riff (also known by its generic name apremilast) is an oral medication approved in March 2014 by the U.S. Food and Drug Administration (FDA) for the treatment of active psoriatic arthritis in adults. In September 2014, it was approved for moderate to severe plaque psoriasis. How does it work? Marine Riff treats psoriasis and psoriatic arthritis by regulating inflammation within immune cells. It inhibits an enzyme known as phosphodiesterase 4, or PDE4. PDE4 controls much of the inflammatory action within the cell, which can affect the level of inflammation associated with psoriatic disease. By helping to control inflammation in this way, Otezla improves joint tenderness and swelling in people with psoriatic arthritis, and redness and scaliness of plaque psoriasis. Who can take it? Marine Riff is prescribed for adults with active psoriatic arthritis. It is also prescribed for people with moderate to severe plaque psoriasis. Who should not take it?  The safety and effectiveness of Marine Riff in people under 25years of age has not been established.  The dose of Marine Riff should be modified or reduced in people with severe renal impairment. Nursing women should use caution when taking Marine Riff.  People with a known severe allergic reaction to this treatment or its components should not take Marine Riff. Side effects   Nausea   Diarrhea   Weight loss   Headache    Generally, these side effects were mild and do not cause patients to stop taking Marine Riff. They can occur during the first two weeks of treatment and tend to lessen with continued treatment. How is it used? Marine Riff is available as a 30 milligram (mg) tablet. The first five days of treatment is a start period, where the dosage will gradually increase over five days until the recommended dose of 30 milligrams twice daily is reached. Marine Riff is designed to be taken continuously to maintain improvement.     Can it be

## 2020-06-24 ENCOUNTER — OFFICE VISIT (OUTPATIENT)
Dept: DERMATOLOGY | Age: 55
End: 2020-06-24
Payer: COMMERCIAL

## 2020-06-24 VITALS — TEMPERATURE: 97.5 F

## 2020-06-24 PROCEDURE — 99213 OFFICE O/P EST LOW 20 MIN: CPT | Performed by: DERMATOLOGY

## 2020-06-24 RX ORDER — CHOLECALCIFEROL (VITAMIN D3) 125 MCG
CAPSULE ORAL
Qty: 30 CAPSULE | Refills: 5 | Status: SHIPPED | OUTPATIENT
Start: 2020-06-24 | End: 2022-10-25

## 2020-06-27 NOTE — PROGRESS NOTES
Patient's Name: Mario Syed  MRN: <[id number]>  YOB: 1965  Date of Visit: 6/24/2020  Primary Care Provider: REGINA Klein CNP  Referring Provider: No ref. provider found    Subjective:     Chief Complaint   Patient presents with    Psoriasis     f/u discuss Otezla        History of Present Illness: Patient presents for follow-up of scalp disease. Patient was last evaluated on 6/17/20. At their last visit they were prescribed clobetasol 0.05% solution  Patient reports her scalp has not changed  since last visit. However she does endorse very slight improvement after applying the clobetasol. Patient does report having loose stools as a result of taking Cellcept. denies fatigue, recent infections, fevers, night sweats, or unintentional weight loss  GI: denies nausea, heartburn, vomiting, abdominal pain, constipation  Skin: {denies leg swelling, mouth sores  Neuro: denies headaches or tremor. Side effects from medication (other): None    Past medical/surgical/family history reviewed with no changes since last visit on 6/17/20    Past Medical History:  Past Medical History:   Diagnosis Date    Allergic rhinitis     Arthritis     Asthma     Chronic sinusitis     Dermatitis     Hypothyroidism     Polymyositis ([address]        Past Surgical History:  Past Surgical History:   Procedure Laterality Date    CARPAL TUNNEL RELEASE Right 9/11/2019    RIGHT CARPAL TUNNEL RELEASE performed by Valentin Regalado MD at [facility] Left 12/27/2019    LEFT CARPAL TUNNEL RELEASE performed by Valentin Regalado MD at [address] [place] [address]  2012    SKIN BIOPSY      TONSILLECTOMY      WISDOM TOOTH EXTRACTION         Past Family History:  Family History   Problem Relation Age of Onset    Heart Disease Mother         afib    Stroke Mother     Atrial Fibrillation Mother     COPD Mother     Diabetes Father          Allergies:   Allergies Allergen Reactions    Wasp Venom Other (See Comments)       Current Medications:  Current Outpatient Medications   Medication Sig Dispense Refill    Lactobacillus (PROBIOTIC ACIDOPHILUS) CAPS Take one capsule once daily 30 capsule 5    clobetasol (TEMOVATE) 0.05 % external solution Apply a small amount daily for 2 weeks or until improved. 100 mL 3    levothyroxine (SYNTHROID) 100 MCG tablet 1 tab daily 90 tablet 0    mycophenolate (CELLCEPT) 500 MG tablet Take 2 tab po  tablet 3    clobetasol (TEMOVATE) 0.05 % ointment Apply to affected area twice daily on body and hands 60 g 2    triamcinolone (KENALOG) 0.025 % cream Apply to affected area on face twice daily 80 g 2    azaTHIOprine (IMURAN) 50 MG tablet Take 3 tab po daily. (Patient taking differently: daily ) 90 tablet 2    ibuprofen (ADVIL;MOTRIN) 600 MG tablet Take 1 tablet by mouth every 6 hours as needed for Pain 60 tablet 1    PARoxetine (PAXIL) 10 MG tablet Take 1 tablet by mouth every morning 90 tablet 1    ALBUTEROL SULFATE IN Inhale into the lungs as needed      predniSONE (DELTASONE) 10 MG tablet 2 tab po daily x 15 days. 1.5 tab po daily x 15 days,thereafter take 1 tab po daily. (Patient taking differently: 5 mg daily ) 120 tablet 1     No current facility-administered medications for this visit. Review of Systems:  Constitutional: No fevers, chills or recent illness.  feels well   Skin: Skin:As per HPI AND otherwise no new, bleeding or symptomatic skin lesions    Objective:   Physical Examination:  General: alert, comfortable, no apparent distress, well-appearing  Psych: alert, oriented and pleasant  Neuro: oriented to person, place, and time  Skin: Areas examined: head including face, lips, conjunctiva and lids, neck, hair/scalp, chest, right upper extremity, left upper extremity, left hand, right hand and digits and nails      All areas examined were within normal limits except those listed below with the appropriate

## 2020-06-27 NOTE — PATIENT INSTRUCTIONS
Otezla (apremilast)     What is it? Dartha Pod (also known by its generic name apremilast) is an oral medication approved in March 2014 by the U.S. Food and Drug Administration (FDA) for the treatment of active psoriatic arthritis in adults. In September 2014, it was approved for moderate to severe plaque psoriasis. How does it work? Dartha Pod treats psoriasis and psoriatic arthritis by regulating inflammation within immune cells. It inhibits an enzyme known as phosphodiesterase 4, or PDE4. PDE4 controls much of the inflammatory action within the cell, which can affect the level of inflammation associated with psoriatic disease. By helping to control inflammation in this way, Otezla improves joint tenderness and swelling in people with psoriatic arthritis, and redness and scaliness of plaque psoriasis. Who can take it? Dartha Pod is prescribed for adults with active psoriatic arthritis. It is also prescribed for people with moderate to severe plaque psoriasis. Who should not take it?  The safety and effectiveness of Dartha Pod in people under 25years of age has not been established.  The dose of Dartha Pod should be modified or reduced in people with severe renal impairment. Nursing women should use caution when taking Dartha Pod.  People with a known severe allergic reaction to this treatment or its components should not take Dartha Pod. Side effects   Nausea   Diarrhea   Weight loss   Headache    Generally, these side effects were mild and do not cause patients to stop taking Dartha Pod. They can occur during the first two weeks of treatment and tend to lessen with continued treatment. How is it used? Dartha Pod is available as a 30 milligram (mg) tablet. The first five days of treatment is a start period, where the dosage will gradually increase over five days until the recommended dose of 30 milligrams twice daily is reached. Dartha Pod is designed to be taken continuously to maintain improvement.     Can it be

## 2020-07-08 RX ORDER — APREMILAST 30 MG/1
1 TABLET, FILM COATED ORAL 2 TIMES DAILY
Qty: 60 TABLET | Refills: 0 | Status: SHIPPED | OUTPATIENT
Start: 2020-07-08 | End: 2020-07-29 | Stop reason: SDUPTHER

## 2020-07-08 NOTE — TELEPHONE ENCOUNTER
Pt called lm on vm stating she received a copay card from [facility] and it will only be $5 for her to fill the medication at [facility], she states she needs a new rx sent to them

## 2020-07-14 ENCOUNTER — OFFICE VISIT (OUTPATIENT)
Dept: RHEUMATOLOGY | Age: 55
End: 2020-07-14
Payer: COMMERCIAL

## 2020-07-14 VITALS — BODY MASS INDEX: 28.27 KG/M2 | TEMPERATURE: 98.1 F | WEIGHT: 144 LBS | HEIGHT: 60 IN

## 2020-07-14 LAB
A/G RATIO: 1.6 (ref 1.1–2.2)
ALBUMIN SERPL-MCNC: 3.9 G/DL (ref 3.4–5)
ALP BLD-CCNC: 80 U/L (ref 40–129)
ALT SERPL-CCNC: 18 U/L (ref 10–40)
ANION GAP SERPL CALCULATED.3IONS-SCNC: 13 MMOL/L (ref 3–16)
AST SERPL-CCNC: 22 U/L (ref 15–37)
BASOPHILS ABSOLUTE: 0 K/UL (ref 0–0.2)
BASOPHILS RELATIVE PERCENT: 0.4 %
BILIRUB SERPL-MCNC: <0.2 MG/DL (ref 0–1)
BILIRUBIN URINE: NEGATIVE
BLOOD, URINE: ABNORMAL
BUN BLDV-MCNC: 13 MG/DL (ref 7–20)
C-REACTIVE PROTEIN: 6.6 MG/L (ref 0–5.1)
CALCIUM SERPL-MCNC: 8.9 MG/DL (ref 8.3–10.6)
CHLORIDE BLD-SCNC: 104 MMOL/L (ref 99–110)
CLARITY: ABNORMAL
CO2: 25 MMOL/L (ref 21–32)
COLOR: YELLOW
CREAT SERPL-MCNC: 0.6 MG/DL (ref 0.6–1.1)
EOSINOPHILS ABSOLUTE: 0.2 K/UL (ref 0–0.6)
EOSINOPHILS RELATIVE PERCENT: 2.5 %
EPITHELIAL CELLS, UA: 7 /HPF (ref 0–5)
GFR AFRICAN AMERICAN: >60
GFR NON-AFRICAN AMERICAN: >60
GLOBULIN: 2.5 G/DL
GLUCOSE BLD-MCNC: 74 MG/DL (ref 70–99)
GLUCOSE URINE: NEGATIVE MG/DL
HCT VFR BLD CALC: 40.6 % (ref 36–48)
HEMOGLOBIN: 13.5 G/DL (ref 12–16)
HYALINE CASTS: 3 /LPF (ref 0–8)
KETONES, URINE: NEGATIVE MG/DL
LEUKOCYTE ESTERASE, URINE: ABNORMAL
LYMPHOCYTES ABSOLUTE: 1.1 K/UL (ref 1–5.1)
LYMPHOCYTES RELATIVE PERCENT: 15.8 %
MCH RBC QN AUTO: 31.3 PG (ref 26–34)
MCHC RBC AUTO-ENTMCNC: 33.2 G/DL (ref 31–36)
MCV RBC AUTO: 94.3 FL (ref 80–100)
MICROSCOPIC EXAMINATION: YES
MONOCYTES ABSOLUTE: 0.5 K/UL (ref 0–1.3)
MONOCYTES RELATIVE PERCENT: 7.5 %
NEUTROPHILS ABSOLUTE: 5.2 K/UL (ref 1.7–7.7)
NEUTROPHILS RELATIVE PERCENT: 73.8 %
NITRITE, URINE: NEGATIVE
PDW BLD-RTO: 13.1 % (ref 12.4–15.4)
PH UA: 5 (ref 5–8)
PLATELET # BLD: 326 K/UL (ref 135–450)
PMV BLD AUTO: 8.3 FL (ref 5–10.5)
POTASSIUM SERPL-SCNC: 4 MMOL/L (ref 3.5–5.1)
PROTEIN UA: NEGATIVE MG/DL
RBC # BLD: 4.31 M/UL (ref 4–5.2)
RBC UA: 2 /HPF (ref 0–4)
SEDIMENTATION RATE, ERYTHROCYTE: 13 MM/HR (ref 0–30)
SODIUM BLD-SCNC: 142 MMOL/L (ref 136–145)
SPECIFIC GRAVITY UA: 1.02 (ref 1–1.03)
TOTAL CK: 350 U/L (ref 26–192)
TOTAL PROTEIN: 6.4 G/DL (ref 6.4–8.2)
URINE TYPE: ABNORMAL
UROBILINOGEN, URINE: 0.2 E.U./DL
WBC # BLD: 7.1 K/UL (ref 4–11)
WBC UA: 29 /HPF (ref 0–5)

## 2020-07-14 PROCEDURE — 99214 OFFICE O/P EST MOD 30 MIN: CPT | Performed by: INTERNAL MEDICINE

## 2020-07-14 PROCEDURE — 81003 URINALYSIS AUTO W/O SCOPE: CPT | Performed by: INTERNAL MEDICINE

## 2020-07-14 NOTE — PROGRESS NOTES
[facility], [place]                                                                                                                          (N) [phone] (F)      Dear Dr. Zenobia Conroy, APRN - CNP:  Please find Rheumatology assessment. Thank you for giving me the opportunity to be involved in Linda Hays's care and I look forward following Linda along with you. If you have any questions or concerns please feel free to reach me. Note is transcribed using voice recognition software. Inadvertent computerized transcription errors may be present. Patient identification: Latanya Hays,: ,26 y.o. Sex: female     A/P  Linda was seen today for follow-up. 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 A/P from Today's visit-  1. Polymyositis-anti-Deidra syndrome-  Muscle weakness resolved, peripheral skin changes improved still has some periungual changes on CellCept 1 g twice daily, Imuran 50 mg daily and prednisone 5 mg a day.     She continues to have scaly lesions in her bothers her is the skin mainly in the scalp, and sometimes dry spots in her fingers. 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 She does not believe that she will be able to continue Saint Elieser and more over her insurance denied it. She continues to take other medications as prescribed. Tolerating it well. No myositis flare. She denies any shortness of breath, exertional dyspnea. Denies any muscle weakness, dysphagia, diplopia, muscle pain, cough, shortness of breath, joint pain. ADLs and recreational activities are normal.  All other review of systems are negative. Labs- Strongly positive anti SHANDRA. Rf, CCP negatve. Had elevated sed rate, CRP, CPK and aldolase at the onset. All other ROS are negative.     Past Medical History:   Diagnosis Date    Allergic rhinitis     Arthritis     Asthma     Chronic sinusitis     Dermatitis     Hypothyroidism     Polymyositis ([facility] [address]      Past Surgical History:   Procedure Laterality Date    CARPAL TUNNEL RELEASE Right 9/11/2019    RIGHT CARPAL TUNNEL RELEASE performed by Violet Burrows MD at [facility] Left 12/27/2019    LEFT CARPAL TUNNEL RELEASE performed by Violet Burrows MD at [address] [facility]  2012    SKIN BIOPSY      TONSILLECTOMY      WISDOM TOOTH EXTRACTION       Social History     Socioeconomic History    Marital status:      Spouse name: Not on file    Number of children: Not on file    Years of education: Not on file    Highest education level: Not on file   Occupational History    Not on file   Social Needs    Financial resource strain: Not on file    Food insecurity     Worry: Not on file     Inability: Not on file    Transportation needs     Medical: Not on file     Non-medical: Not on file   Tobacco Use    Smoking status: Passive Smoke Exposure - Never Smoker    Smokeless tobacco: Never Used   Substance and Sexual Activity    Alcohol use: No     Alcohol/week: 0.0 standard drinks    Drug use: No    Sexual activity: Yes     Partners: Male     Comment:     Lifestyle    Physical activity     Days per week: Not on file     Minutes per session: Not on file    Stress: Not on file   Relationships    Social connections     Talks on phone: Not on file     Gets together: Not on file     Attends Christianity service: Not on file     Active member of club or organization: Not on file     Attends meetings of clubs or organizations: Not on file     Relationship status: Not on file    Intimate partner violence     Fear of current or ex partner: Not on file     Emotionally abused: Not on file     Physically abused: Not on file     Forced sexual activity: Not on file   Other Topics Concern    Not on file   Social History Narrative    Not on file       No family history of autoimmune diseases    Current Outpatient Medications   Medication Sig Dispense Refill    Apremilast (OTEZLA) 30 MG TABS Take 1 tablet by mouth 2 times daily 60 tablet 0    Lactobacillus (PROBIOTIC ACIDOPHILUS) CAPS Take one capsule once daily 30 capsule 5    clobetasol (TEMOVATE) 0.05 % external solution Apply a small amount daily for 2 weeks or until improved. 100 mL 3    levothyroxine (SYNTHROID) 100 MCG tablet 1 tab daily 90 tablet 0    mycophenolate (CELLCEPT) 500 MG tablet Take 2 tab po  tablet 3    clobetasol (TEMOVATE) 0.05 % ointment Apply to affected area twice daily on body and hands 60 g 2    triamcinolone (KENALOG) 0.025 % cream Apply to affected area on face twice daily 80 g 2    azaTHIOprine (IMURAN) 50 MG tablet Take 3 tab po daily.  (Patient taking differently: daily ) 90 tablet 2    ibuprofen (ADVIL;MOTRIN) 600 MG tablet Take 1 tablet by mouth every 6 hours as needed for Pain 60 tablet 1    PARoxetine (PAXIL) 10 MG tablet Take 1 tablet by mouth every morning 90 tablet 1    ALBUTEROL SULFATE IN Inhale into the lungs as needed      predniSONE (DELTASONE) 10 MG tablet 2 tab po daily x 15 days. 1.5 tab po daily x 15 days,thereafter take 1 tab po daily. (Patient taking differently: 5 mg daily ) 120 tablet 1     No current facility-administered medications for this visit. Allergies   Allergen Reactions    Wasp Venom Other (See Comments)       PHYSICAL EXAM:    Vitals:    Temp 98.1 °F (36.7 °C) (Skin)   Ht 5' (1.524 m)   Wt 144 lb (65.3 kg)   LMP 09/12/2013   BMI 28.12 kg/m²   General appearance/ Psychiatric: well nourished, and well groomed, normal judgement, alert, appears stated age and cooperative. MKS: She has normal musculoskeletal examination and upper, lower spleen and spine without any tender, swollen inflamed joints in upper or lower extremities. Mild dryness on the palm of her right and left thumb. Otherwise no peripheral skin rashes. She does have finely scaly rash in her scalp mainly occipital area, looks more like a seborrhea. Mild periungual erythema. Normal muscle strength in upper and lower extremities proximally and distally. Able to get up from squatting position without any difficulty. Neck flexion extension are normal.  Distal muscles in her finger joints and feet normal.   Extraocular movements are normal.  Chest: Normal effort, clear to auscultation. Heart, S1-S2 regular no edema.       DATA:   Lab Results   Component Value Date    WBC 8.9 05/14/2020    HGB 14.3 05/14/2020    HCT 43.4 05/14/2020    MCV 95.4 05/14/2020     05/14/2020         Chemistry        Component Value Date/Time     05/14/2020 0727    K 4.0 05/14/2020 0727     05/14/2020 0727    CO2 27 05/14/2020 0727    BUN 14 05/14/2020 0727    CREATININE 0.7 05/14/2020 0727        Component Value Date/Time    CALCIUM 9.2 05/14/2020 0727    ALKPHOS 76 05/14/2020 0727    AST 25 05/14/2020 0727    ALT 26 05/14/2020 0727    BILITOT 0.3 05/14/2020 0727          No results found for: [facility]  Lab Results   Component Value Date    SEDRATE 10 05/14/2020     Lab Results   Component Value Date    CRP 7.4 (H) 05/14/2020     Lab Results   Component Value Date    MICHELLE POSITIVE (A) 06/12/2019    SEDRATE 10 05/14/2020     Lab Results   Component Value Date    VTABCFZ 658 (H) 05/14/2020     Lab Results   Component Value Date    TSH 2.40 07/26/2018     Lab Results   Component Value Date    VITD25 28.2 (L) 09/16/2019         Radiology Review:    7/9/19-   IMPRESSION:  Mild restrictive ventilatory defect. Finding may be  consistent with neuromuscular chest wall, pleural, or parenchymal  disorder, including mild obesity.     A/P- See above.

## 2020-07-16 LAB — ALDOLASE: 10.6 U/L (ref 1.5–8.1)

## 2020-07-17 ENCOUNTER — TELEPHONE (OUTPATIENT)
Dept: DERMATOLOGY | Age: 55
End: 2020-07-17

## 2020-07-17 NOTE — TELEPHONE ENCOUNTER
Spoke with patient:    Patient states she did have bad s/e., but she was reading that s/e will subside after initial 2 weeks, so she will like to continue with Saint Elieser. She has noticed an improvement in the short duration she has been taking Saint Elieser. She does not want to start Plaquenil, she doesn't think it is necessary     She says if insurance coverage continues to be problematic, she wants to know if there is another medication that is covered that she can take as an alternative? Please review/advise.

## 2020-08-19 ENCOUNTER — TELEPHONE (OUTPATIENT)
Dept: INTERNAL MEDICINE CLINIC | Age: 55
End: 2020-08-19

## 2020-08-19 DIAGNOSIS — Z00.00 ROUTINE GENERAL MEDICAL EXAMINATION AT A HEALTH CARE FACILITY: ICD-10-CM

## 2020-08-19 LAB
A/G RATIO: 1.6 (ref 1.1–2.2)
ALBUMIN SERPL-MCNC: 3.7 G/DL (ref 3.4–5)
ALP BLD-CCNC: 74 U/L (ref 40–129)
ALT SERPL-CCNC: 20 U/L (ref 10–40)
ANION GAP SERPL CALCULATED.3IONS-SCNC: 10 MMOL/L (ref 3–16)
AST SERPL-CCNC: 26 U/L (ref 15–37)
BILIRUB SERPL-MCNC: 0.4 MG/DL (ref 0–1)
BUN BLDV-MCNC: 10 MG/DL (ref 7–20)
CALCIUM SERPL-MCNC: 9 MG/DL (ref 8.3–10.6)
CHLORIDE BLD-SCNC: 106 MMOL/L (ref 99–110)
CHOLESTEROL, FASTING: 140 MG/DL (ref 0–199)
CO2: 26 MMOL/L (ref 21–32)
CREAT SERPL-MCNC: 0.7 MG/DL (ref 0.6–1.1)
GFR AFRICAN AMERICAN: >60
GFR NON-AFRICAN AMERICAN: >60
GLOBULIN: 2.3 G/DL
GLUCOSE BLD-MCNC: 88 MG/DL (ref 70–99)
HCT VFR BLD CALC: 41.3 % (ref 36–48)
HDLC SERPL-MCNC: 42 MG/DL (ref 40–60)
HEMOGLOBIN: 13.5 G/DL (ref 12–16)
LDL CHOLESTEROL CALCULATED: 64 MG/DL
MCH RBC QN AUTO: 30.2 PG (ref 26–34)
MCHC RBC AUTO-ENTMCNC: 32.7 G/DL (ref 31–36)
MCV RBC AUTO: 92.3 FL (ref 80–100)
PDW BLD-RTO: 13.4 % (ref 12.4–15.4)
PLATELET # BLD: 299 K/UL (ref 135–450)
PMV BLD AUTO: 8.9 FL (ref 5–10.5)
POTASSIUM SERPL-SCNC: 4.3 MMOL/L (ref 3.5–5.1)
RBC # BLD: 4.48 M/UL (ref 4–5.2)
SODIUM BLD-SCNC: 142 MMOL/L (ref 136–145)
TOTAL PROTEIN: 6 G/DL (ref 6.4–8.2)
TRIGLYCERIDE, FASTING: 171 MG/DL (ref 0–150)
TSH REFLEX: 2.17 UIU/ML (ref 0.27–4.2)
VLDLC SERPL CALC-MCNC: 34 MG/DL
WBC # BLD: 5.7 K/UL (ref 4–11)

## 2020-08-19 NOTE — TELEPHONE ENCOUNTER
----- Message from [facility] [place] [facility] INPATIENT sent at 8/19/2020  7:47 AM EDT -----  Subject: Message to Provider    QUESTIONS  Information for Provider? Javad [facility]ch patient lab . Requesting   lab orders to be placed in epic for patient . Physical labs - Please call   to advise what labs need to be ordered  ---------------------------------------------------------------------------  --------------  CALL BACK INFO  What is the best way for the office to contact you? OK to leave message on   voicemail  Preferred Call Back Phone Number? [phone]  ---------------------------------------------------------------------------  --------------  SCRIPT ANSWERS  Relationship to Patient? Other  Representative Name? Corinne finch patient lab   Is the Representative on the appropriate HIPAA document in Epic?  Yes

## 2020-08-20 LAB — VITAMIN D 25-HYDROXY: 22.5 NG/ML

## 2020-09-03 ENCOUNTER — OFFICE VISIT (OUTPATIENT)
Dept: INTERNAL MEDICINE CLINIC | Age: 55
End: 2020-09-03
Payer: COMMERCIAL

## 2020-09-03 VITALS — WEIGHT: 148 LBS | TEMPERATURE: 97.8 F | BODY MASS INDEX: 28.9 KG/M2

## 2020-09-03 PROCEDURE — 99396 PREV VISIT EST AGE 40-64: CPT | Performed by: NURSE PRACTITIONER

## 2020-09-04 PROBLEM — G56.01 RIGHT CARPAL TUNNEL SYNDROME: Status: RESOLVED | Noted: 2019-08-16 | Resolved: 2020-09-04

## 2020-09-04 PROBLEM — M75.52 BURSITIS OF LEFT SHOULDER: Status: RESOLVED | Noted: 2019-03-12 | Resolved: 2020-09-04

## 2020-09-04 PROBLEM — G56.02 LEFT CARPAL TUNNEL SYNDROME: Status: RESOLVED | Noted: 2019-11-19 | Resolved: 2020-09-04

## 2020-09-04 PROBLEM — M79.89 SWELLING OF BOTH HANDS: Status: RESOLVED | Noted: 2019-05-07 | Resolved: 2020-09-04

## 2020-09-04 PROBLEM — J40 BRONCHITIS: Status: RESOLVED | Noted: 2018-12-27 | Resolved: 2020-09-04

## 2020-09-04 PROBLEM — E03.4 HYPOTHYROIDISM DUE TO ACQUIRED ATROPHY OF THYROID: Status: ACTIVE | Noted: 2020-09-04

## 2020-09-04 ASSESSMENT — PATIENT HEALTH QUESTIONNAIRE - PHQ9
SUM OF ALL RESPONSES TO PHQ9 QUESTIONS 1 & 2: 0
1. LITTLE INTEREST OR PLEASURE IN DOING THINGS: 0
SUM OF ALL RESPONSES TO PHQ QUESTIONS 1-9: 0
SUM OF ALL RESPONSES TO PHQ QUESTIONS 1-9: 0
2. FEELING DOWN, DEPRESSED OR HOPELESS: 0

## 2020-09-04 ASSESSMENT — ENCOUNTER SYMPTOMS
NAUSEA: 0
EYE ITCHING: 0
COUGH: 0
CONSTIPATION: 0
WHEEZING: 0
VOMITING: 0
BACK PAIN: 0
DIARRHEA: 0
EYE REDNESS: 0
SINUS PRESSURE: 0
SHORTNESS OF BREATH: 0
BLOOD IN STOOL: 0
COLOR CHANGE: 0
CHEST TIGHTNESS: 0
SORE THROAT: 0
ABDOMINAL PAIN: 0
RHINORRHEA: 0

## 2020-09-04 NOTE — PROGRESS NOTES
Subjective:      Patient ID: Joy Caballero is a 47 y.o. y.o. female. HPI    Joy Caballero is here for a physical.  She is taking all medications as prescirbed  She is following with Dr. Yaya Seay for psoriatic arthritis   She follows derm as well   She has no concerns today   She already had her labs completed.      Chief Complaint   Patient presents with    Annual Exam       Vitals:    09/03/20 1542   Temp: 97.8 °F (36.6 °C)       Wt Readings from Last 3 Encounters:   09/03/20 148 lb (67.1 kg)   07/14/20 144 lb (65.3 kg)   03/18/20 144 lb (65.3 kg)       Past Medical History:   Diagnosis Date    Allergic rhinitis     Arthritis     Asthma     Chronic sinusitis     Dermatitis     Hypothyroidism     Polymyositis ([facility] [address]        Past Surgical History:   Procedure Laterality Date    CARPAL TUNNEL RELEASE Right 9/11/2019    RIGHT CARPAL TUNNEL RELEASE performed by Fannie Porras MD at [facility] Left 12/27/2019    LEFT CARPAL TUNNEL RELEASE performed by Fannie Porras MD at [address] [facility]  2012    SKIN BIOPSY      TONSILLECTOMY      WISDOM TOOTH EXTRACTION         Family History   Problem Relation Age of Onset    Heart Disease Mother         afib    Stroke Mother     Atrial Fibrillation Mother     COPD Mother     Diabetes Father        Social History     Tobacco Use    Smoking status: Passive Smoke Exposure - Never Smoker    Smokeless tobacco: Never Used   Substance Use Topics    Alcohol use: No     Alcohol/week: 0.0 standard drinks    Drug use: No       Immunization History   Administered Date(s) Administered    Hepatitis B Adult (Engerix-B) 06/27/2019, 07/24/2019, 08/30/2019    Influenza Virus Vaccine 10/22/2019    Influenza, Quadv, IM, PF (6 mo and older Fluzone, Flulaval, Fluarix, and 3 yrs and older Afluria) 10/13/2016    Pneumococcal Polysaccharide ([id number]) 10/23/2015    Tdap (Boostrix, Adacel) 09/12/2014    Zoster Recombinant (Shingrix) 11/20/2019, 02/19/2020       Health Maintenance   Topic Date Due    Cervical cancer screen  09/24/2013    Flu vaccine (1) 09/01/2020    Breast cancer screen  08/30/2020    TSH testing  08/19/2021    DTaP/Tdap/Td vaccine (2 - Td) 09/12/2024    Lipid screen  08/19/2025    Colon cancer screen colonoscopy  08/29/2026    Shingles Vaccine  Completed    Pneumococcal 0-64 years Vaccine  Completed    Hepatitis C screen  Completed    HIV screen  Completed    Hepatitis A vaccine  Aged Out    Hepatitis B vaccine  Aged Out    Hib vaccine  Aged Out    Meningococcal (ACWY) vaccine  Aged Out       Discussed over the counter medication with patient. Discussed use, benefit, and side effects of prescribed medications. Barriers to medication compliance addressed. Allpatient questions answered. Pt voiced understanding. Medications reviewed and patient understands. Questions answered    Review of Systems   Constitutional: Negative for chills, fatigue and fever. HENT: Negative for congestion, ear pain, postnasal drip, rhinorrhea, sinus pressure, sneezing and sore throat. Eyes: Negative for redness and itching. Respiratory: Negative for cough, chest tightness, shortness of breath and wheezing. Cardiovascular: Negative for chest pain and palpitations. Gastrointestinal: Negative for abdominal pain, blood in stool, constipation, diarrhea, nausea and vomiting. Endocrine: Negative for cold intolerance and heat intolerance. Genitourinary: Negative for difficulty urinating, dysuria, flank pain, frequency, hematuria and urgency. Musculoskeletal: Negative for arthralgias, back pain, joint swelling and myalgias. Skin: Negative for color change, pallor, rash and wound. Allergic/Immunologic: Negative for environmental allergies and food allergies. Neurological: Negative for dizziness, seizures, syncope, weakness, light-headedness, numbness and headaches.    Hematological: Negative for adenopathy. Does not bruise/bleed easily. Psychiatric/Behavioral: Negative for confusion, sleep disturbance and suicidal ideas. The patient is not nervous/anxious and is not hyperactive. All other systems reviewed and are negative. Objective:   Physical Exam  Constitutional:       Appearance: She is well-developed. HENT:      Head: Normocephalic. Right Ear: External ear normal.      Left Ear: External ear normal.   Eyes:      Conjunctiva/sclera: Conjunctivae normal.      Pupils: Pupils are equal, round, and reactive to light. Neck:      Musculoskeletal: Normal range of motion and neck supple. Vascular: No JVD. Cardiovascular:      Rate and Rhythm: Normal rate and regular rhythm. Heart sounds: No murmur. No friction rub. No gallop. Pulmonary:      Effort: Pulmonary effort is normal. No respiratory distress. Breath sounds: Normal breath sounds. No wheezing. Abdominal:      General: Bowel sounds are normal. There is no distension. Palpations: Abdomen is soft. There is no mass. Tenderness: There is no abdominal tenderness. There is no guarding or rebound. Musculoskeletal: Normal range of motion. General: No tenderness. Skin:     General: Skin is warm and dry. Neurological:      Mental Status: She is alert and oriented to person, place, and time. Deep Tendon Reflexes: Reflexes are normal and symmetric. Psychiatric:         Behavior: Behavior normal.         Assessment:      See ProblemList assessment and plan       Plan:      Routine general medical examination at a health care facility  All labs reviewed  Follows with gyn for pap   Mammogram ordered      Hypothyroidism due to acquired atrophy of thyroid  Stable, controlled  No changes  Continue current treatment plan            Patient engaged in shared decision making.  Information given to evaluate options of treatment, understand what is needed and discuss importance of following

## 2020-09-14 ENCOUNTER — OFFICE VISIT (OUTPATIENT)
Dept: RHEUMATOLOGY | Age: 55
End: 2020-09-14
Payer: COMMERCIAL

## 2020-09-14 VITALS — TEMPERATURE: 97.8 F | WEIGHT: 148 LBS | BODY MASS INDEX: 29.06 KG/M2 | HEIGHT: 60 IN

## 2020-09-14 PROCEDURE — 99214 OFFICE O/P EST MOD 30 MIN: CPT | Performed by: INTERNAL MEDICINE

## 2020-09-14 NOTE — PROGRESS NOTES
She did not feel comfortable continuing hydroxychloroquine. Normal ADLs and recreational activities. TPMT activity is mildly  Low ( 22.2, normal is 24-44). PFT normal-DLCO, mild restrictive defect though to be from poor chest wall effort. Tanja Boop Up-to-date with age-specific cancer screening. Plan-  Increase CellCept 1.5 g twice daily. Finish Imuran 50 mg a day, has a month supply left over. Stay on 5 mg of prednisone daily. Shoulder exercises were discussed for rotator cuff disease. If arthralgias and myalgias get worse in her finger joints, I would certainly recommend hydroxychloroquine although she does not feel comfortable at this time. She is due for PFTs, would like to hold off at this time due to the cost of the therapy as well as COVID pandemic. She was made aware that she did have ILD and is important to monitor progression of ILD, highly recommend getting PFTs before the end of this year. Recheck labs in 1 month, follow-up in 3 months. Call me with any flares or worsening symptoms. She is aware of myositis emergencies, doing much better now, do not anticipate any. Patient indicates understanding and agrees with the management plan. I reviewed patient's history, referral documents and electronic medical records. #######################################################################    Subjective-  Follow-up for polymyositis. Interval changes-  Other than mild stiffness in the finger joints in the morning without any swelling or significant morbidity and persistent discomfort in the left shoulder especially at night, in the morning and moving left shoulder in certain position causes soreness, she does not have any other musculoskeletal concerns. No muscle weakness, stiffness or pain. Normal ADLs and recreational activities. Denies any cough, shortness of breath or limitation in functional capacity.   Redness and scaling of her scalp is persistent although is doing better with some kind of topical range.  hands-does have few dry scaly and cracked fingertips, better than the onset of her symptoms. While tapering prednisone, the symptoms are returning. She is tolerating medications well. Denies any infections, respiratory symptoms or GI symptoms. Rest of review of systems are negative. Labs- Strongly positive anti SHANDRA. Rf, CCP negatve. Had elevated sed rate, CRP, CPK and aldolase at the onset. All other ROS are negative.     Past Medical History:   Diagnosis Date    Allergic rhinitis     Arthritis     Asthma     Chronic sinusitis     Dermatitis     Hypothyroidism     Polymyositis (HCC)      Past Surgical History:   Procedure Laterality Date    CARPAL TUNNEL RELEASE Right 9/11/2019    RIGHT CARPAL TUNNEL RELEASE performed by Violet Burrows MD at [address] [facility] Left 12/27/2019    LEFT CARPAL TUNNEL RELEASE performed by Violet Burrows MD at [address] [facility]  2012    SKIN BIOPSY      TONSILLECTOMY      WISDOM TOOTH EXTRACTION       Social History     Socioeconomic History    Marital status:      Spouse name: Not on file    Number of children: Not on file    Years of education: Not on file    Highest education level: Not on file   Occupational History    Not on file   Social Needs    Financial resource strain: Not on file    Food insecurity     Worry: Not on file     Inability: Not on file    Transportation needs     Medical: Not on file     Non-medical: Not on file   Tobacco Use    Smoking status: Passive Smoke Exposure - Never Smoker    Smokeless tobacco: Never Used   Substance and Sexual Activity    Alcohol use: No     Alcohol/week: 0.0 standard drinks    Drug use: No    Sexual activity: Yes     Partners: Male     Comment:     Lifestyle    Physical activity     Days per week: Not on file     Minutes per session: Not on file    Stress: Not on file   Relationships    Social connections Talks on phone: Not on file     Gets together: Not on file     Attends Druze service: Not on file     Active member of club or organization: Not on file     Attends meetings of clubs or organizations: Not on file     Relationship status: Not on file    Intimate partner violence     Fear of current or ex partner: Not on file     Emotionally abused: Not on file     Physically abused: Not on file     Forced sexual activity: Not on file   Other Topics Concern    Not on file   Social History Narrative    Not on file       No family history of autoimmune diseases    Current Outpatient Medications   Medication Sig Dispense Refill    mycophenolate (CELLCEPT) 500 MG tablet TAKE 3 TABLETS BY MOUTH TWICE DAILY 180 tablet 2    Lactobacillus (PROBIOTIC ACIDOPHILUS) CAPS Take one capsule once daily 30 capsule 5    clobetasol (TEMOVATE) 0.05 % external solution Apply a small amount daily for 2 weeks or until improved. 100 mL 3    levothyroxine (SYNTHROID) 100 MCG tablet 1 tab daily 90 tablet 0    clobetasol (TEMOVATE) 0.05 % ointment Apply to affected area twice daily on body and hands 60 g 2    triamcinolone (KENALOG) 0.025 % cream Apply to affected area on face twice daily 80 g 2    azaTHIOprine (IMURAN) 50 MG tablet Take 3 tab po daily. (Patient taking differently: daily ) 90 tablet 2    ibuprofen (ADVIL;MOTRIN) 600 MG tablet Take 1 tablet by mouth every 6 hours as needed for Pain 60 tablet 1    PARoxetine (PAXIL) 10 MG tablet Take 1 tablet by mouth every morning 90 tablet 1    ALBUTEROL SULFATE IN Inhale into the lungs as needed      predniSONE (DELTASONE) 10 MG tablet 2 tab po daily x 15 days. 1.5 tab po daily x 15 days,thereafter take 1 tab po daily. (Patient taking differently: 5 mg daily ) 120 tablet 1     No current facility-administered medications for this visit.       Allergies   Allergen Reactions    Wasp Venom Other (See Comments)       PHYSICAL EXAM:    Vitals:    Temp 97.8 °F (36.6 °C) (Skin) Ht 5' (1.524 m)   Wt 148 lb (67.1 kg)   LMP 09/12/2013   BMI 28.90 kg/m²   General appearance/ Psychiatric: well nourished, and well groomed, normal judgement, alert, appears stated age and cooperative. MKS: Other than subjective stiffness in her finger joints, left shoulder, has normal musculoskeletal examination in upper and lower extremities. Full range of motion in both shoulders including left but notices subjective discomfort at extreme degrees of extension internal and external rotation. Mild dryness and cracking noted in her fingertips bilaterally. Scalp redness and scaliness looks better than the past.  No periungual changes seen at this time. No other skin rashes. No induration or skin thickening. Normal muscle strength in upper and lower extremities proximally and distally. Able to get up from squatting position without any difficulty. Neck flexion extension are normal.  Distal muscles in her finger joints and feet normal.   Extraocular movements are normal.  Chest: Normal effort, clear to auscultation. No crackles. Heart, S1-S2 regular no edema.       DATA:   Lab Results   Component Value Date    WBC 5.7 08/19/2020    HGB 13.5 08/19/2020    HCT 41.3 08/19/2020    MCV 92.3 08/19/2020     08/19/2020         Chemistry        Component Value Date/Time     08/19/2020 0730    K 4.3 08/19/2020 0730     08/19/2020 0730    CO2 26 08/19/2020 0730    BUN 10 08/19/2020 0730    CREATININE 0.7 08/19/2020 0730        Component Value Date/Time    CALCIUM 9.0 08/19/2020 0730    ALKPHOS 74 08/19/2020 0730    AST 26 08/19/2020 0730    ALT 20 08/19/2020 0730    BILITOT 0.4 08/19/2020 0730          No results found for: [facility]  Lab Results   Component Value Date    SEDRATE 13 07/14/2020     Lab Results   Component Value Date    CRP 6.6 (H) 07/14/2020     Lab Results   Component Value Date    MICHELLE POSITIVE (A) 06/12/2019    SEDRATE 13 07/14/2020     Lab Results   Component Value Date

## 2020-09-17 RX ORDER — LEVOTHYROXINE SODIUM 0.1 MG/1
TABLET ORAL
Qty: 90 TABLET | Refills: 0 | Status: SHIPPED | OUTPATIENT
Start: 2020-09-17 | End: 2020-12-16

## 2020-10-04 PROBLEM — Z00.00 ROUTINE GENERAL MEDICAL EXAMINATION AT A HEALTH CARE FACILITY: Status: RESOLVED | Noted: 2019-08-30 | Resolved: 2020-10-04

## 2020-10-06 ENCOUNTER — PATIENT MESSAGE (OUTPATIENT)
Dept: DERMATOLOGY | Age: 55
End: 2020-10-06

## 2020-10-12 LAB
ALT SERPL-CCNC: 31 U/L (ref 10–40)
AST SERPL-CCNC: 28 U/L (ref 15–37)
BASOPHILS ABSOLUTE: 0.1 K/UL (ref 0–0.2)
BASOPHILS RELATIVE PERCENT: 1.2 %
CREAT SERPL-MCNC: 0.6 MG/DL (ref 0.6–1.1)
EOSINOPHILS ABSOLUTE: 0.1 K/UL (ref 0–0.6)
EOSINOPHILS RELATIVE PERCENT: 1.6 %
GFR AFRICAN AMERICAN: >60
GFR NON-AFRICAN AMERICAN: >60
HCT VFR BLD CALC: 39.9 % (ref 36–48)
HEMOGLOBIN: 13.2 G/DL (ref 12–16)
LYMPHOCYTES ABSOLUTE: 1 K/UL (ref 1–5.1)
LYMPHOCYTES RELATIVE PERCENT: 17.5 %
MCH RBC QN AUTO: 29.7 PG (ref 26–34)
MCHC RBC AUTO-ENTMCNC: 33.1 G/DL (ref 31–36)
MCV RBC AUTO: 89.7 FL (ref 80–100)
MONOCYTES ABSOLUTE: 0.5 K/UL (ref 0–1.3)
MONOCYTES RELATIVE PERCENT: 8.1 %
NEUTROPHILS ABSOLUTE: 4 K/UL (ref 1.7–7.7)
NEUTROPHILS RELATIVE PERCENT: 71.6 %
PDW BLD-RTO: 13.2 % (ref 12.4–15.4)
PLATELET # BLD: 308 K/UL (ref 135–450)
PMV BLD AUTO: 8.5 FL (ref 5–10.5)
RBC # BLD: 4.45 M/UL (ref 4–5.2)
SEDIMENTATION RATE, ERYTHROCYTE: 10 MM/HR (ref 0–30)
WBC # BLD: 5.6 K/UL (ref 4–11)

## 2020-10-12 PROCEDURE — 36415 COLL VENOUS BLD VENIPUNCTURE: CPT | Performed by: INTERNAL MEDICINE

## 2020-10-13 DIAGNOSIS — M33.20 POLYMYOSITIS (HCC): ICD-10-CM

## 2020-10-13 LAB
C-REACTIVE PROTEIN: 9.7 MG/L (ref 0–5.1)
TOTAL CK: 727 U/L (ref 26–192)

## 2020-10-15 LAB — ALDOLASE: 14.3 U/L (ref 1.5–8.1)

## 2020-11-06 ENCOUNTER — TELEPHONE (OUTPATIENT)
Dept: DERMATOLOGY | Age: 55
End: 2020-11-06

## 2020-11-06 NOTE — TELEPHONE ENCOUNTER
Pt was advised when she stopped by this ma that she needs an appt, she was also sent this same message in [facility]

## 2020-12-01 ENCOUNTER — TELEPHONE (OUTPATIENT)
Dept: DERMATOLOGY | Age: 55
End: 2020-12-01

## 2020-12-01 NOTE — TELEPHONE ENCOUNTER
Called pt and advised her I need a copy of the result test date in order for her to be seen tomorrow, pt verbalized understanding and will email the results

## 2020-12-02 ENCOUNTER — VIRTUAL VISIT (OUTPATIENT)
Dept: RHEUMATOLOGY | Age: 55
End: 2020-12-02
Payer: COMMERCIAL

## 2020-12-02 ENCOUNTER — VIRTUAL VISIT (OUTPATIENT)
Dept: DERMATOLOGY | Age: 55
End: 2020-12-02
Payer: COMMERCIAL

## 2020-12-02 PROCEDURE — 99214 OFFICE O/P EST MOD 30 MIN: CPT | Performed by: DERMATOLOGY

## 2020-12-02 PROCEDURE — 99214 OFFICE O/P EST MOD 30 MIN: CPT | Performed by: INTERNAL MEDICINE

## 2020-12-02 RX ORDER — PREDNISONE 1 MG/1
TABLET ORAL
Qty: 120 TABLET | Refills: 1 | Status: SHIPPED | OUTPATIENT
Start: 2020-12-02 | End: 2022-10-25

## 2020-12-02 NOTE — PROGRESS NOTES
along with you. If you have any questions or concerns please feel free to reach me. Note is transcribed using voice recognition software. Inadvertent computerized transcription errors may be present. Patient identification: Silverio Hays,: ,18 y.o. Sex: female     A/P  Linda was seen today for follow-up and arthritis. 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 A/P from Today's visit-  Currently has Covid infection-asymptomatic. 1.  Polymyositis-anti-Deidra syndrome-  Getting worse-skin and joint disease-symptoms as well as laboratory parameters including CPK, inflammatory markers. Has been experiencing more pain, swelling, stiffness in his joints as well as has rash on the dorsum of her hands. Scalp rash is also persistent. She ran out of prednisone. Continues to take 50 mg of azathioprine, CellCept 1 g in the morning and 1.5 g in the evening. She is in the process of getting [facility] [place] for scalp rash, that would likely help dermatomyositis rash as well. I encouraged patient to start hydroxychloroquine which does help with dermatomyositis rash. She has a lot of reservations special about ocular toxicity which is not really concerned with it first 5 to 7 years of use.   She would like to think about it and Arthritis     Asthma     Chronic sinusitis     Dermatitis     Hypothyroidism     Polymyositis (HCC)      Past Surgical History:   Procedure Laterality Date    CARPAL TUNNEL RELEASE Right 9/11/2019    RIGHT CARPAL TUNNEL RELEASE performed by Kimberli Guadalupe MD at [address] [facility] Left 12/27/2019    LEFT CARPAL TUNNEL RELEASE performed by Kimberli Guadalupe MD at [address] [address]    SKIN BIOPSY      TONSILLECTOMY      WISDOM TOOTH EXTRACTION       Social History     Socioeconomic History    Marital status:      Spouse name: Not on file    Number of children: Not on file    Years of education: Not on file    Highest education level: Not on file   Occupational History    Not on file   Social Needs    Financial resource strain: Not on file    Food insecurity     Worry: Not on file     Inability: Not on file    Transportation needs     Medical: Not on file     Non-medical: Not on file   Tobacco Use    Smoking status: Passive Smoke Exposure - Never Smoker    Smokeless tobacco: Never Used   Substance and Sexual Activity    Alcohol use: No     Alcohol/week: 0.0 standard drinks    Drug use: No    Sexual activity: Yes     Partners: Male     Comment:     Lifestyle    Physical activity     Days per week: Not on file     Minutes per session: Not on file    Stress: Not on file   Relationships    Social connections     Talks on phone: Not on file     Gets together: Not on file     Attends Sikh service: Not on file     Active member of club or organization: Not on file     Attends meetings of clubs or organizations: Not on file     Relationship status: Not on file    Intimate partner violence     Fear of current or ex partner: Not on file     Emotionally abused: Not on file     Physically abused: Not on file     Forced sexual activity: Not on file   Other Topics Concern    Not on file   Social History Narrative    Not on file       No weakness in upper or lower extremities. Skin-I do see mild erythema on the dorsum of her fingers, dryness and cracking noted in her fingertips as well as erythema in her anterior upper chest.  She tells me that her scalp also looks red and scaly. Chest: Normal effort        DATA:   Lab Results   Component Value Date    WBC 5.6 10/12/2020    HGB 13.2 10/12/2020    HCT 39.9 10/12/2020    MCV 89.7 10/12/2020     10/12/2020         Chemistry        Component Value Date/Time     08/19/2020 0730    K 4.3 08/19/2020 0730     08/19/2020 0730    CO2 26 08/19/2020 0730    BUN 10 08/19/2020 0730    CREATININE 0.6 10/12/2020 0728        Component Value Date/Time    CALCIUM 9.0 08/19/2020 0730    ALKPHOS 74 08/19/2020 0730    AST 28 10/12/2020 0728    ALT 31 10/12/2020 0728    BILITOT 0.4 08/19/2020 0730          No results found for: [facility]  Lab Results   Component Value Date    SEDRATE 10 10/12/2020     Lab Results   Component Value Date    CRP 9.7 (H) 10/12/2020     Lab Results   Component Value Date    MICHELLE POSITIVE (A) 06/12/2019    SEDRATE 10 10/12/2020     Lab Results   Component Value Date    SBKUMSO 967 (H) 10/12/2020     Lab Results   Component Value Date    TSH 2.40 07/26/2018     Lab Results   Component Value Date    VITD25 22.5 (L) 08/19/2020         Radiology Review:    7/9/19-   IMPRESSION:  Mild restrictive ventilatory defect. Finding may be  consistent with neuromuscular chest wall, pleural, or parenchymal  disorder, including mild obesity.     A/P- See above.

## 2020-12-02 NOTE — PROGRESS NOTES
TELEHEALTH EVALUATION -- Audio/Visual (During LCIUT-61 public health emergency)    I have explained to Ms. Linda Hays  that a Physical Examination is inherently limited by the nature of telemedicine and that this may lead to delayed or inadequate diagnosis. I also explained that she may require a higher level of care if a diagnosis can not be reasonably established with a virtual visit. Ms. Ernesto Cristobal  has provided her Consent to proceed with a Virtual Visit today. Ms. Ernesto Cristobal   was personally present on the video link with me today. This visit was completed virtually using the [employer] platform. Due to this being a TeleHealth encounter, evaluation of the following organ systems is limited: Vitals/Constitutional/EENT/Resp/CV/GI//MS/Neuro/Skin/Heme-Lymph-Imm. Patient's Name: Ernesto Cristobal  MRN: <[id number]>  YOB: 1965  Date of Visit: 12/2/2020  Primary Care Provider: REGINA Jolly CNP  Referring Provider: No ref. provider found    Subjective:     Chief Complaint   Patient presents with    Psoriasis        History of Present Illness:  Ernesto Cristobal is a 54 y.o. 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 Patient was last evaluated on 6/24/20. At their last visit they were prescribed Otezla starter pack for her scalp disease. She declined initiating plaquenil or IVIG at that time. Unfortunately there were several conflicting messages in regards to her [facility] which lead to delay in submitting an appeal.  Patient reports her scalp disease has worsened, as did her skin on her neck, chest, flanks and hands. She also reports weakness of her muscle as well as pain. She has not been applying the topical steroids to her scalp, as she feels the natural product she has does better. Patient states she is currently off Prednisone and only taking cellcept. She states that when she took [place], she developed diarrhea and headaches and had to hold it for several days. She is scheduled to see Dr. Hans Penaloza today and will be discussing her muscle pain. Past medical/surgical/family history reviewed with no changes since last visit on 6/24/20    She is currently working from home. Past Medical History:  Past Medical History:   Diagnosis Date    Allergic rhinitis     Arthritis     Asthma     Chronic sinusitis     Dermatitis     Hypothyroidism     Polymyositis ([address]        Past Surgical History:  Past Surgical History:   Procedure Laterality Date    CARPAL TUNNEL RELEASE Right 9/11/2019    RIGHT CARPAL TUNNEL RELEASE performed by Sigrid Siddiqui MD at [facility] Left 12/27/2019    LEFT CARPAL TUNNEL RELEASE performed by Sigrid Siddiqui MD at [facility]  2012    SKIN BIOPSY      TONSILLECTOMY      WISDOM TOOTH EXTRACTION         Past Family History:  Family History   Problem Relation Age of Onset    Heart Disease Mother         afib    Stroke Mother     Atrial Fibrillation Mother     COPD Mother     Diabetes Father          Allergies: Allergies   Allergen Reactions    Wasp Venom Other (See Comments)       Current Medications:  Current Outpatient Medications   Medication Sig Dispense Refill    levothyroxine (SYNTHROID) 100 MCG tablet 1 tab daily 90 tablet 0    mycophenolate (CELLCEPT) 500 MG tablet TAKE 3 TABLETS BY MOUTH TWICE DAILY 180 tablet 2    Lactobacillus (PROBIOTIC ACIDOPHILUS) CAPS Take one capsule once daily 30 capsule 5    clobetasol (TEMOVATE) 0.05 % external solution Apply a small amount daily for 2 weeks or until improved.  100 mL 3    clobetasol (TEMOVATE) 0.05 % ointment Apply to affected area twice daily on body and hands 60 g 2  triamcinolone (KENALOG) 0.025 % cream Apply to affected area on face twice daily 80 g 2    azaTHIOprine (IMURAN) 50 MG tablet Take 3 tab po daily. (Patient taking differently: daily ) 90 tablet 2    ibuprofen (ADVIL;MOTRIN) 600 MG tablet Take 1 tablet by mouth every 6 hours as needed for Pain 60 tablet 1    PARoxetine (PAXIL) 10 MG tablet Take 1 tablet by mouth every morning (Patient not taking: Reported on 12/2/2020) 90 tablet 1    ALBUTEROL SULFATE IN Inhale into the lungs as needed      predniSONE (DELTASONE) 10 MG tablet 2 tab po daily x 15 days. 1.5 tab po daily x 15 days,thereafter take 1 tab po daily. (Patient not taking: Reported on 12/2/2020) 120 tablet 1     No current facility-administered medications for this visit. Review of Systems:  Constitutional: No fevers, chills or recent illness. fatigued and frustrated   Skin: Skin:As per HPI AND otherwise no new, bleeding or symptomatic skin lesions      Objective:   Lab Results:  10/12/20  Aldolase 14. 3High   1.5 - 8.1 U/L       Total CK 727High   26 - 192 U/L         Physical Examination:  General: alert, comfortable, no apparent distress, well-appearing  Psych: alert, oriented and pleasant  Neuro: oriented to person, place, and time  Skin: Areas examined: head including face, lips, conjunctiva and lids, neck, hair/scalp, chest, including breasts and axilla, right upper extremity, left upper extremity, right lower extremity, left lower extremity, left hand, right hand and digits and nails      All areas examined were within normal limits except those listed below with the appropriate assessment and plan    Assessment and Plan (with relevant objective exam findings):     1.  Dermatomyositis flaring  Location: V neck, upper chest, upper back, arms, torso, hips Objective findings:poikilodermatous erythema with fine scaling (shawl sign), violaceous hyperkeratotic scaly plaques on hips and torso. Erythema and thick scale on scalp extending onto frontal and occipital hairline. Violaceous erythematous plaques with hyperkeratosis on dorsal distal phalanges. I had a very lengthy discussion with patient regarding her disease activity and treatment options. Discussed plaquenil, however patient is concerned about side effects especially retinal SE which I explained that they are not of a concern until ~ 5 years of therapy. Patient does not want to pursue this treatment as an option. We discussed IVIG for her skin disease which has been flaring recently. Risk of thrombosis is the most serious side effect. - Will obtain serum IgA to assess for IgA deficiency and if need to use IgA depleted IVIG. As for [facility], it has been used off label for DM scalp disease with success. Patient is acquiring a new insurance and will submit PA for [facility] as patient failed topical treatment of her scalp disease.  - Will provide patient with a slow titration regimen and add antidiarrheal medication. Patient will send insurance card as soon as she receives it so we can send the PA. Recommended sun avoidance, use of protective clothing and broad spectrum sunscreen containing avobenzone, titanium dioxide, or zinc oxide with SPF 50 or higher and yearly full skin exams with a dermatologist.     Will discuss with Dr. Art Said treatment plan especially that her muscle disease is flaring currently. I spent a minimum of 35 minutes interacting face-to-face with this patient. More than 50% of this time was spent counseling and coordinating care for the patient regarding their disease(s) and therapies. This included reviewing the patients chief complaint, reviewing and clarifying the patients present illness, reviewing and clarifying the patients past medical history, reviewing and clarifying the patients review of systems/social history/family history, reviewing and clarifying the patients current medications/drug allergies, reviewing and highlighting pertinent aspects of any available outside medical records, reviewing any available outside biopsy reports/biopsy slides, examining the patients skin, discussing with the patient my specific diagnostic impressions and any relevant differential diagnoses, discussing with the patient the rationale for any lab tests needed for further evaluation, ordering/performing any such needed tests , discussing possible treatment strategies with the patient, discussing my specific treatment recommendations, discussing possible side effects of the recommended treatment(s), discussing strategies for avoiding/minimizing treatment side effects, discussing the patients prognosis, and arranging for follow-up. Follow up:  Return visit in 4-6 weeks or as needed for change in condition. All questions addressed. Procedure:   No procedure performed      Pursuant to the emergency declaration under the [facility] waiver authority and the [employer] Act, this Virtual  Visit was conducted, with patient's consent, to reduce the patient's risk of exposure to COVID-19 and provide continuity of care for an established patient. Services were provided through a video synchronous discussion virtually to substitute for in-person clinic visit.             Nancy Pantoja MD. MS

## 2020-12-14 RX ORDER — MYCOPHENOLATE MOFETIL 500 MG/1
TABLET ORAL
Qty: 180 TABLET | Refills: 2 | Status: SHIPPED | OUTPATIENT
Start: 2020-12-14 | End: 2021-01-19 | Stop reason: SDUPTHER

## 2020-12-16 RX ORDER — LEVOTHYROXINE SODIUM 0.1 MG/1
TABLET ORAL
Qty: 90 TABLET | Refills: 0 | Status: SHIPPED | OUTPATIENT
Start: 2020-12-16 | End: 2021-03-08 | Stop reason: SDUPTHER

## 2020-12-29 LAB
BASOPHILS ABSOLUTE: 0 K/UL (ref 0–0.2)
BASOPHILS RELATIVE PERCENT: 0.5 %
EOSINOPHILS ABSOLUTE: 0.3 K/UL (ref 0–0.6)
EOSINOPHILS RELATIVE PERCENT: 3.5 %
HCT VFR BLD CALC: 41.8 % (ref 36–48)
HEMOGLOBIN: 13.3 G/DL (ref 12–16)
LYMPHOCYTES ABSOLUTE: 1 K/UL (ref 1–5.1)
LYMPHOCYTES RELATIVE PERCENT: 14.2 %
MCH RBC QN AUTO: 29 PG (ref 26–34)
MCHC RBC AUTO-ENTMCNC: 31.8 G/DL (ref 31–36)
MCV RBC AUTO: 91.5 FL (ref 80–100)
MONOCYTES ABSOLUTE: 0.5 K/UL (ref 0–1.3)
MONOCYTES RELATIVE PERCENT: 6.9 %
NEUTROPHILS ABSOLUTE: 5.5 K/UL (ref 1.7–7.7)
NEUTROPHILS RELATIVE PERCENT: 74.9 %
PDW BLD-RTO: 14.8 % (ref 12.4–15.4)
PLATELET # BLD: 329 K/UL (ref 135–450)
PMV BLD AUTO: 8.5 FL (ref 5–10.5)
RBC # BLD: 4.57 M/UL (ref 4–5.2)
SEDIMENTATION RATE, ERYTHROCYTE: 10 MM/HR (ref 0–30)
WBC # BLD: 7.3 K/UL (ref 4–11)

## 2020-12-29 PROCEDURE — 36415 COLL VENOUS BLD VENIPUNCTURE: CPT | Performed by: INTERNAL MEDICINE

## 2020-12-30 LAB
ALDOLASE: 25.8 U/L (ref 1.5–8.1)
ALT SERPL-CCNC: 37 U/L (ref 10–40)
AST SERPL-CCNC: 40 U/L (ref 15–37)
C-REACTIVE PROTEIN: 13.6 MG/L (ref 0–5.1)
CREAT SERPL-MCNC: 0.6 MG/DL (ref 0.6–1.1)
GFR AFRICAN AMERICAN: >60
GFR NON-AFRICAN AMERICAN: >60
TOTAL CK: 1252 U/L (ref 26–192)

## 2020-12-30 RX ORDER — HYDROXYCHLOROQUINE SULFATE 200 MG/1
TABLET, FILM COATED ORAL
Qty: 60 TABLET | Refills: 2 | Status: SHIPPED | OUTPATIENT
Start: 2020-12-30 | End: 2021-03-05

## 2021-01-18 ENCOUNTER — PATIENT MESSAGE (OUTPATIENT)
Dept: RHEUMATOLOGY | Age: 56
End: 2021-01-18

## 2021-01-19 RX ORDER — MYCOPHENOLATE MOFETIL 500 MG/1
1500 TABLET ORAL 2 TIMES DAILY
Qty: 180 TABLET | Refills: 2 | Status: SHIPPED | OUTPATIENT
Start: 2021-01-19 | End: 2021-11-11

## 2021-01-19 NOTE — TELEPHONE ENCOUNTER
From: Judith Hays  To: Rasheeda Rojo MD  Sent: 1/18/2021 5:22 PM EST  Subject: Prescription Question    Hello, my insurance changed to [employer]. I will need to have the mycophenolate refilled through [facility] home delivery now. Please delete [place] in my files. I am asking for a refill. Not sure how long it will take to set up and ship to me. Thank you.
Rx pended for approval and pharmacy has been changed in pt's chart.
Khris Zavala(Attending)

## 2021-03-05 DIAGNOSIS — M33.20 POLYMYOSITIS (HCC): ICD-10-CM

## 2021-03-05 RX ORDER — HYDROXYCHLOROQUINE SULFATE 200 MG/1
TABLET, FILM COATED ORAL
Qty: 60 TABLET | Refills: 1 | Status: SHIPPED | OUTPATIENT
Start: 2021-03-05 | End: 2021-05-05

## 2021-03-08 DIAGNOSIS — E03.9 ACQUIRED HYPOTHYROIDISM: ICD-10-CM

## 2021-03-09 DIAGNOSIS — E03.9 ACQUIRED HYPOTHYROIDISM: ICD-10-CM

## 2021-03-09 RX ORDER — LEVOTHYROXINE SODIUM 0.1 MG/1
TABLET ORAL
Qty: 90 TABLET | Refills: 0 | Status: SHIPPED | OUTPATIENT
Start: 2021-03-09 | End: 2021-05-17

## 2021-03-09 RX ORDER — LEVOTHYROXINE SODIUM 0.1 MG/1
TABLET ORAL
Qty: 90 TABLET | Refills: 0 | Status: SHIPPED | OUTPATIENT
Start: 2021-03-09 | End: 2021-08-27

## 2021-03-23 ENCOUNTER — VIRTUAL VISIT (OUTPATIENT)
Dept: RHEUMATOLOGY | Age: 56
End: 2021-03-23
Payer: COMMERCIAL

## 2021-03-23 DIAGNOSIS — M33.20 POLYMYOSITIS (HCC): ICD-10-CM

## 2021-03-23 DIAGNOSIS — R21 SKIN RASH: ICD-10-CM

## 2021-03-23 DIAGNOSIS — D89.89 ANTISYNTHETASE SYNDROME (HCC): Primary | ICD-10-CM

## 2021-03-23 DIAGNOSIS — Z79.899 HIGH RISK MEDICATION USE: ICD-10-CM

## 2021-03-23 PROCEDURE — G8419 CALC BMI OUT NRM PARAM NOF/U: HCPCS | Performed by: INTERNAL MEDICINE

## 2021-03-23 PROCEDURE — 1036F TOBACCO NON-USER: CPT | Performed by: INTERNAL MEDICINE

## 2021-03-23 PROCEDURE — 99214 OFFICE O/P EST MOD 30 MIN: CPT | Performed by: INTERNAL MEDICINE

## 2021-03-23 PROCEDURE — 3017F COLORECTAL CA SCREEN DOC REV: CPT | Performed by: INTERNAL MEDICINE

## 2021-03-23 PROCEDURE — G8484 FLU IMMUNIZE NO ADMIN: HCPCS | Performed by: INTERNAL MEDICINE

## 2021-03-23 PROCEDURE — G8427 DOCREV CUR MEDS BY ELIG CLIN: HCPCS | Performed by: INTERNAL MEDICINE

## 2021-03-23 NOTE — PROGRESS NOTES
Isha Rao is a 54 y.o. female being evaluated by a Virtual Visit (video visit) encounter to address concerns as mentioned above. A caregiver was present when appropriate. Due to this being a TeleHealth encounter (During CZW-51 public health emergency), evaluation of the following organ systems was limited: Vitals/Constitutional/EENT/Resp/CV/GI//MS/Neuro/Skin/Heme-Lymph-Imm. Pursuant to the emergency declaration under the [address] [place] [address] and the [employer] and [employer] Act, this Virtual Visit was conducted with patient's (and/or legal guardian's) consent, to reduce the patient's risk of exposure to COVID-19 and provide necessary medical care. The patient (and/or legal guardian) has also been advised to contact this office for worsening conditions or problems, and seek emergency medical treatment and/or call 911 if deemed necessary. Patient identification was verified at the start of the visit: Yes    Total time spent on this encounter: Not billed by time    Services were provided through a video synchronous discussion virtually to substitute for in-person clinic visit. Patient and provider were located at their individual homes. --Shayan Park MD on 3/23/2021 at 11:06 AM    An electronic signature was used to authenticate this note. [address] [place] [address], MD                                                                                                                         [phone] (L) [phone] (F)      Dear Dr. Darius Huertas, APRN - CNP:  Please find Rheumatology assessment. Thank you for giving me the opportunity to be involved in Linda Hays's care and I look forward following Linda along with you. If you have any questions or concerns please feel free to reach me. Note is transcribed using voice recognition software. Inadvertent computerized transcription errors may be present. Patient identification: Juan Hays,: ,84 y.o. Sex: female     A/P  Linda was seen today for follow-up. 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[facility]  -     CBC Auto Differential; Future  -     Comprehensive Metabolic Panel; Future  -     Sedimentation Rate; Future  -     C-Reactive Protein; Future  -     CK; Future  -     Aldolase; Future  -     Urinalysis; Future  -     Full PFT Study With Bronchodilator; Future  -     6 Minute Walk Test; Future    High risk medication use  -     CBC Auto Differential; Future  -     Comprehensive Metabolic Panel; Future  -     Sedimentation Rate; Future  -     C-Reactive Protein; Future  -     CK; Future  -     Aldolase; Future  -     Urinalysis; Future    Skin rash    Polymyositis (HCC)      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 A/P from Today's visit-    1. Polymyositis-anti-Deidra syndrome-   Clinically appears stable in terms of muscle pain/weakness but has ongoing scalp erythema and redness in her anterior neck and face. Check labs to assess disease activity as above, and PFTs to monitor for ILD. Continue CellCept 1.5 g twice daily, hydroxychloroquine 400 mg a day and prednisone 5 mg a day. 2.  Scalp lesions-worse, hydroxychloroquine did not help per patient. Recommend seeing dermatology. Agree with Óscar Pereyra. 3.  Multiple elements that include fatigue, insomnia, hot flashes, intermittent numbness in the hands-discussed conservative measures, and also seeing primary care physician if symptoms are persistent.   Unlikely to be related to inflammatory myopathy or other medications. TPMT activity is mildly  Low ( 22.2, normal is 24-44). PFT normal-DLCO, mild restrictive defect though to be from poor chest wall effort. Alla Mimssagar Up-to-date with age-specific cancer screening. Plan-  As above. Reiterated the importance that she considers trying hydroxychloroquine along with current regimen of CellCept, Imuran and prednisone. Lab work in January, follow-up in 3 months. She is aware of myositis emergencies, doing much better now, do not anticipate any. Patient indicates understanding and agrees with the management plan. I reviewed patient's history, referral documents and electronic medical records. #######################################################################    Subjective-  Follow-up for polymyositis. Interval changes-  She has multiple concerns and questions today that include persistent scalp lesions, continue to flake, does not believe that current medications are helping any. Is also noticing redness in her neck and forehead. Gets occasional loose stools once or twice a week. Self-limiting. Has poor sleep, wakes up several times at night, feels foggy, at times depressed. Her boss also noticed some of those changes per patient. Sometimes notices numbness in both hands, had CTS surgery last year. Intermittently right shoulder is bothering her. She does not have any swollen, inflamed joints. No focal muscle weakness. Denies diplopia, dysphagia, neck weakness or any weakness in upper or lower extremities. Denies any cough, shortness of breath at rest or exertion. Physically active, exercises at her home gym and basement for half an hour to 1 hour high intensity. Tolerating medications well. Is on CellCept, hydroxychloroquine and 5 mg of prednisone. Is due for labs.         No family history of autoimmune diseases    Current Outpatient Medications   Medication Sig Dispense Refill    levothyroxine (SYNTHROID) 100 MCG tablet TAKE 1 TABLET DAILY 90 tablet 0    levothyroxine (SYNTHROID) 100 MCG tablet TAKE 1 TABLET DAILY 90 tablet 0    hydroxychloroquine (PLAQUENIL) 200 MG tablet TAKE ONE TABLET BY MOUTH TWICE A DAY 60 tablet 1    mycophenolate (CELLCEPT) 500 MG tablet Take 3 tablets by mouth 2 times daily 180 tablet 2    predniSONE (DELTASONE) 5 MG tablet Take 2 tab po daily x 15 days, take1.5 mg po daily x 15 days then take 1 tab po daily. 120 tablet 1    Lactobacillus (PROBIOTIC ACIDOPHILUS) CAPS Take one capsule once daily 30 capsule 5    clobetasol (TEMOVATE) 0.05 % external solution Apply a small amount daily for 2 weeks or until improved. 100 mL 3    clobetasol (TEMOVATE) 0.05 % ointment Apply to affected area twice daily on body and hands 60 g 2    triamcinolone (KENALOG) 0.025 % cream Apply to affected area on face twice daily 80 g 2    ibuprofen (ADVIL;MOTRIN) 600 MG tablet Take 1 tablet by mouth every 6 hours as needed for Pain 60 tablet 1    ALBUTEROL SULFATE IN Inhale into the lungs as needed       No current facility-administered medications for this visit. Allergies   Allergen Reactions    Wasp Venom Other (See Comments)       PHYSICAL EXAM:    Vitals:    LMP 09/12/2013   General appearance/ Psychiatric: well nourished, and well groomed, normal judgement, alert, appears stated age and cooperative. MKS: She does not have any abnormal musculoskeletal findings to show in VS.  Skin-mild erythema noted in her forehead and lower anterior neck. No other rashes seen.   I do not see any changes in his skin, had mechanics hand in the past. Chest: Normal effort        DATA:   Lab Results   Component Value Date    WBC 7.3 12/29/2020    HGB 13.3 12/29/2020    HCT 41.8 12/29/2020    MCV 91.5 12/29/2020     12/29/2020         Chemistry        Component Value Date/Time     08/19/2020 0730    K 4.3 08/19/2020 0730     08/19/2020 0730    CO2 26 08/19/2020 0730    BUN 10 08/19/2020 0730    CREATININE 0.6 12/29/2020 1150        Component Value Date/Time    CALCIUM 9.0 08/19/2020 0730    ALKPHOS 74 08/19/2020 0730    AST 40 (H) 12/29/2020 1150    ALT 37 12/29/2020 1150    BILITOT 0.4 08/19/2020 0730          No results found for: [facility]  Lab Results   Component Value Date    SEDRATE 10 12/29/2020     Lab Results   Component Value Date    CRP 13.6 (H) 12/29/2020     Lab Results   Component Value Date    MICHELLE POSITIVE (A) 06/12/2019    SEDRATE 10 12/29/2020     Lab Results   Component Value Date    CKTOTAL 1,252 (H) 12/29/2020     Lab Results   Component Value Date    TSH 2.40 07/26/2018     Lab Results   Component Value Date    VITD25 22.5 (L) 08/19/2020         Radiology Review:    7/9/19-   IMPRESSION:  Mild restrictive ventilatory defect. Finding may be  consistent with neuromuscular chest wall, pleural, or parenchymal  disorder, including mild obesity.     A/P- See above.

## 2021-03-24 RX ORDER — APREMILAST 30 MG/1
30 TABLET, FILM COATED ORAL 2 TIMES DAILY
Qty: 60 TABLET | Refills: 3 | Status: SHIPPED | OUTPATIENT
Start: 2021-03-24 | End: 2021-08-27

## 2021-03-31 ENCOUNTER — NURSE TRIAGE (OUTPATIENT)
Dept: OTHER | Facility: CLINIC | Age: 56
End: 2021-03-31

## 2021-03-31 ENCOUNTER — TELEPHONE (OUTPATIENT)
Dept: INTERNAL MEDICINE CLINIC | Age: 56
End: 2021-03-31

## 2021-03-31 NOTE — TELEPHONE ENCOUNTER
Reason for Disposition   Patient wants to be seen    Answer Assessment - Initial Assessment Questions  1. ONSET: \"When did the pain start? \"       On and off for months but has gotten bad the last couple of weeks and becoming almost a daily pain. When waking in morning one day pain shot up L side of neck and was unable to move neck. Pain in L arm at times as well. 2. LOCATION: \"Where is the pain located? \"      Mainly in  L shoulder     3. PAIN: \"How bad is the pain? \" (Scale 1-10; or mild, moderate, severe)    - MILD (1-3): doesn't interfere with normal activities    - MODERATE (4-7): interferes with normal activities (e.g., work or school) or awakens from sleep    - SEVERE (8-10): excruciating pain, unable to do any normal activities, unable to move arm at all due to pain                   In shoulder pain is becoming more constant everyday but still intermittent             4/10- 8/10 depending on the movement or use at the time    4. WORK OR EXERCISE: \"Has there been any recent work or exercise that involved this part of the body? \"            Denies     5. CAUSE: \"What do you think is causing the shoulder pain? \"            Rheumatologist thinks it is rotator cuff    6. OTHER SYMPTOMS: \"Do you have any other symptoms? \" (e.g., neck pain, swelling, rash, fever, numbness, weakness)    Intermittent L neck pain,intermittent L arm pain, has had intermittent numbness in both hands when waking and then goes away, denies weakness     7. PREGNANCY: \"Is there any chance you are pregnant? \" \"When was your last menstrual period? \"    Protocols used: SHOULDER PAIN-ADULT-[place]    Received call from Alistair at pre-service center [facility]) William with Red Flag Complaint. Brief description of triage: see above     Triage indicates for patient to be seen today or tomorrow for shoulder pain that is clearly made worse with movement or use.      Care advice provided, patient verbalizes understanding; denies any other questions or concerns; instructed to call back for any new or worsening symptoms. Writer provided warm transfer to [facility] for appointment scheduling. Attention Provider: Thank you for allowing me to participate in the care of your patient. The patient was connected to triage in response to information provided to the E[facility]. Please do not respond through this encounter as the response is not directed to a shared pool.

## 2021-03-31 NOTE — TELEPHONE ENCOUNTER
----- Message from Randal Pandya sent at 3/31/2021 11:46 AM EDT -----  Subject: Appointment Request    Reason for Call: Urgent Return from RN Triage    QUESTIONS  Type of Appointment? Established Patient  Reason for appointment request? Other - Pt could not come in during   suggested timeframe from nurse triage  Additional Information for Provider? Pt could not come in 03/31 or 04/01. Wanted Friday 04/02 - but office closed. She is making up time at work and   cannot come in on 03/31 or 04/01. Nurse Triage stated see 03/31 or 04/01. Please call pt to schedule appt.   ---------------------------------------------------------------------------  --------------  CALL BACK INFO  What is the best way for the office to contact you? OK to leave message on   voicemail  Preferred Call Back Phone Number? [phone]  ---------------------------------------------------------------------------  --------------  SCRIPT ANSWERS  Patient needs to be seen today or tomorrow? Yes  Nurse Name? Marisol  Have you been diagnosed with COVID-19 (Coronavirus)   tested for COVID-19 (Coronavirus)   or told that you are suspected of having COVID-19 (Coronavirus)? Yes  Did your symptoms begin or have you been tested for COVID-19 in the last   10 days? No  Have you had a fever or taken medication to treat a fever within the past   3 days? No  Have you had a cough   shortness of breath or flu-like symptoms within the past 3 days? No  Do you currently have flu-like symptoms including fever or chills   cough   shortness of breath   or difficulty breathing   or new loss of taste or smell? No  (Service Expert  click yes below to proceed with [employer] As Usual   Scheduling)?  Yes

## 2021-03-31 NOTE — TELEPHONE ENCOUNTER
Called and spoke to patient, needs to be seen for shoulder pain, due to conflicting schedules patient has been scheduled for 4/9, patient is okay with this date and verbally understands.

## 2021-04-09 ENCOUNTER — OFFICE VISIT (OUTPATIENT)
Dept: INTERNAL MEDICINE CLINIC | Age: 56
End: 2021-04-09
Payer: COMMERCIAL

## 2021-04-09 ENCOUNTER — HOSPITAL ENCOUNTER (OUTPATIENT)
Dept: PULMONOLOGY | Age: 56
Discharge: HOME | End: 2021-04-09
Payer: COMMERCIAL

## 2021-04-09 ENCOUNTER — APPOINTMENT (OUTPATIENT)
Dept: PULMONOLOGY | Age: 56
End: 2021-04-09
Payer: COMMERCIAL

## 2021-04-09 ENCOUNTER — HOSPITAL ENCOUNTER (OUTPATIENT)
Age: 56
Discharge: HOME | End: 2021-04-09
Payer: COMMERCIAL

## 2021-04-09 ENCOUNTER — HOSPITAL ENCOUNTER (OUTPATIENT)
Dept: GENERAL RADIOLOGY | Age: 56
Discharge: HOME | End: 2021-04-09
Payer: COMMERCIAL

## 2021-04-09 VITALS
SYSTOLIC BLOOD PRESSURE: 122 MMHG | OXYGEN SATURATION: 98 % | WEIGHT: 140 LBS | DIASTOLIC BLOOD PRESSURE: 72 MMHG | BODY MASS INDEX: 27.34 KG/M2 | HEART RATE: 78 BPM

## 2021-04-09 VITALS — OXYGEN SATURATION: 97 %

## 2021-04-09 DIAGNOSIS — Z79.899 HIGH RISK MEDICATION USE: ICD-10-CM

## 2021-04-09 DIAGNOSIS — D89.89 ANTISYNTHETASE SYNDROME (HCC): ICD-10-CM

## 2021-04-09 DIAGNOSIS — M25.512 CHRONIC PAIN IN LEFT SHOULDER: Primary | ICD-10-CM

## 2021-04-09 DIAGNOSIS — M25.512 CHRONIC PAIN IN LEFT SHOULDER: ICD-10-CM

## 2021-04-09 DIAGNOSIS — S46.002D INJURY OF LEFT ROTATOR CUFF, SUBSEQUENT ENCOUNTER: ICD-10-CM

## 2021-04-09 DIAGNOSIS — G89.29 CHRONIC PAIN IN LEFT SHOULDER: ICD-10-CM

## 2021-04-09 DIAGNOSIS — M25.512 ARTHRALGIA OF LEFT SHOULDER REGION: ICD-10-CM

## 2021-04-09 DIAGNOSIS — G89.29 CHRONIC PAIN IN LEFT SHOULDER: Primary | ICD-10-CM

## 2021-04-09 LAB
A/G RATIO: 1.4 (ref 1.1–2.2)
ALBUMIN SERPL-MCNC: 3.8 G/DL (ref 3.4–5)
ALP BLD-CCNC: 67 U/L (ref 40–129)
ALT SERPL-CCNC: 29 U/L (ref 10–40)
ANION GAP SERPL CALCULATED.3IONS-SCNC: 12 MMOL/L (ref 3–16)
AST SERPL-CCNC: 40 U/L (ref 15–37)
BILIRUB SERPL-MCNC: <0.2 MG/DL (ref 0–1)
BUN BLDV-MCNC: 9 MG/DL (ref 7–20)
C-REACTIVE PROTEIN: 5.8 MG/L (ref 0–5.1)
CALCIUM SERPL-MCNC: 8.8 MG/DL (ref 8.3–10.6)
CHLORIDE BLD-SCNC: 102 MMOL/L (ref 99–110)
CO2: 25 MMOL/L (ref 21–32)
CREAT SERPL-MCNC: 0.6 MG/DL (ref 0.6–1.1)
GFR AFRICAN AMERICAN: >60
GFR NON-AFRICAN AMERICAN: >60
GLOBULIN: 2.7 G/DL
GLUCOSE BLD-MCNC: 86 MG/DL (ref 70–99)
POTASSIUM SERPL-SCNC: 3.8 MMOL/L (ref 3.5–5.1)
SODIUM BLD-SCNC: 139 MMOL/L (ref 136–145)
TOTAL CK: 1708 U/L (ref 26–192)
TOTAL PROTEIN: 6.5 G/DL (ref 6.4–8.2)

## 2021-04-09 PROCEDURE — 94726 PLETHYSMOGRAPHY LUNG VOLUMES: CPT

## 2021-04-09 PROCEDURE — 94060 EVALUATION OF WHEEZING: CPT

## 2021-04-09 PROCEDURE — 99214 OFFICE O/P EST MOD 30 MIN: CPT | Performed by: NURSE PRACTITIONER

## 2021-04-09 PROCEDURE — G8419 CALC BMI OUT NRM PARAM NOF/U: HCPCS | Performed by: NURSE PRACTITIONER

## 2021-04-09 PROCEDURE — 73030 X-RAY EXAM OF SHOULDER: CPT

## 2021-04-09 PROCEDURE — 1036F TOBACCO NON-USER: CPT | Performed by: NURSE PRACTITIONER

## 2021-04-09 PROCEDURE — 6360000002 HC RX W HCPCS: Performed by: INTERNAL MEDICINE

## 2021-04-09 PROCEDURE — 94664 DEMO&/EVAL PT USE INHALER: CPT

## 2021-04-09 PROCEDURE — 94729 DIFFUSING CAPACITY: CPT

## 2021-04-09 PROCEDURE — 94760 N-INVAS EAR/PLS OXIMETRY 1: CPT

## 2021-04-09 PROCEDURE — 94618 PULMONARY STRESS TESTING: CPT

## 2021-04-09 PROCEDURE — 3017F COLORECTAL CA SCREEN DOC REV: CPT | Performed by: NURSE PRACTITIONER

## 2021-04-09 PROCEDURE — G8428 CUR MEDS NOT DOCUMENT: HCPCS | Performed by: NURSE PRACTITIONER

## 2021-04-09 RX ORDER — ALBUTEROL SULFATE 2.5 MG/3ML
2.5 SOLUTION RESPIRATORY (INHALATION) ONCE
Status: COMPLETED | OUTPATIENT
Start: 2021-04-09 | End: 2021-04-09

## 2021-04-09 RX ADMIN — ALBUTEROL SULFATE 2.5 MG: 2.5 SOLUTION RESPIRATORY (INHALATION) at 12:33

## 2021-04-09 ASSESSMENT — PATIENT HEALTH QUESTIONNAIRE - PHQ9
DEPRESSION UNABLE TO ASSESS: FUNCTIONAL CAPACITY MOTIVATION LIMITS ACCURACY
SUM OF ALL RESPONSES TO PHQ QUESTIONS 1-9: 0
SUM OF ALL RESPONSES TO PHQ9 QUESTIONS 1 & 2: 0
SUM OF ALL RESPONSES TO PHQ QUESTIONS 1-9: 0
2. FEELING DOWN, DEPRESSED OR HOPELESS: 0
1. LITTLE INTEREST OR PLEASURE IN DOING THINGS: 0

## 2021-04-09 ASSESSMENT — ENCOUNTER SYMPTOMS
SINUS PRESSURE: 0
COLOR CHANGE: 0
BACK PAIN: 0
ABDOMINAL PAIN: 0
WHEEZING: 0
SHORTNESS OF BREATH: 0
SINUS PAIN: 0
CONSTIPATION: 0
DIARRHEA: 0
COUGH: 0

## 2021-04-09 NOTE — PROGRESS NOTES
Linda Hays (:  1965) is a 54 y.o. female,Established patient, here for evaluation of the following chief complaint(s):  Shoulder Pain      ASSESSMENT/PLAN:  1. Chronic pain in left shoulder  -     XR SHOULDER LEFT (MIN 2 VIEWS); Future  -     [facility] Physical [facility]  2. Injury of left rotator cuff, subsequent encounter  -     [facility]  3. Arthralgia of left shoulder region  Assessment & Plan:  Ongoing x1 year  Obtain x-ray  Continue NSAIDs as needed  Provided with home exercises  If no improvement start PT      No follow-ups on file. SUBJECTIVE/OBJECTIVE:  HPI   Here for left shoulder pain that has been ongoing for about a year. Symptoms have been waxing and waning. Recently gotten worse in the last few weeks. She has been taking Advil on occasion. Reports pain is worse when she reaches above her head. Reports pain to the anterior and posterior shoulder and occasionally radiates to her neck. Pain is a dull achy pain. Current Outpatient Medications   Medication Sig Dispense Refill    Apremilast 10 & 20 & 30 MG TBPK As per starter pack directions. 1 each 0    Apremilast (OTEZLA) 30 MG TABS Take 30 mg by mouth 2 times daily Start after completing the starter pack. 60 tablet 3    levothyroxine (SYNTHROID) 100 MCG tablet TAKE 1 TABLET DAILY 90 tablet 0    levothyroxine (SYNTHROID) 100 MCG tablet TAKE 1 TABLET DAILY 90 tablet 0    hydroxychloroquine (PLAQUENIL) 200 MG tablet TAKE ONE TABLET BY MOUTH TWICE A DAY 60 tablet 1    mycophenolate (CELLCEPT) 500 MG tablet Take 3 tablets by mouth 2 times daily 180 tablet 2    predniSONE (DELTASONE) 5 MG tablet Take 2 tab po daily x 15 days, take1.5 mg po daily x 15 days then take 1 tab po daily. 120 tablet 1    Lactobacillus (PROBIOTIC ACIDOPHILUS) CAPS Take one capsule once daily 30 capsule 5    clobetasol (TEMOVATE) 0.05 % external solution Apply a small amount daily for 2 weeks or until improved.  100 mL 3    clobetasol (TEMOVATE) 0.05 % ointment Apply to affected area twice daily on body and hands 60 g 2    triamcinolone (KENALOG) 0.025 % cream Apply to affected area on face twice daily 80 g 2    ibuprofen (ADVIL;MOTRIN) 600 MG tablet Take 1 tablet by mouth every 6 hours as needed for Pain 60 tablet 1    ALBUTEROL SULFATE IN Inhale into the lungs as needed       No current facility-administered medications for this visit. Review of Systems   Constitutional: Negative for chills, fatigue and fever. HENT: Negative for congestion, sinus pressure and sinus pain. Respiratory: Negative for cough, shortness of breath and wheezing. Cardiovascular: Negative for chest pain and palpitations. Gastrointestinal: Negative for abdominal pain, constipation and diarrhea. Musculoskeletal: Positive for arthralgias (left shoulder). Negative for back pain and myalgias. Skin: Negative for color change, pallor and rash. Neurological: Negative for dizziness, syncope, weakness, light-headedness and headaches. Psychiatric/Behavioral: Negative for behavioral problems, confusion and sleep disturbance. The patient is not nervous/anxious. Vitals:    04/09/21 1402   BP: 122/72   Site: Left Upper Arm   Position: Sitting   Cuff Size: Medium Adult   Pulse: 78   SpO2: 98%   Weight: 140 lb (63.5 kg)       Physical Exam  Constitutional:       Appearance: She is well-developed. HENT:      Head: Normocephalic and atraumatic. Eyes:      Conjunctiva/sclera: Conjunctivae normal.      Pupils: Pupils are equal, round, and reactive to light. Neck:      Musculoskeletal: Normal range of motion and neck supple. Thyroid: No thyromegaly. Vascular: No JVD. Cardiovascular:      Rate and Rhythm: Normal rate and regular rhythm. Heart sounds: Normal heart sounds. Pulmonary:      Effort: Pulmonary effort is normal. No respiratory distress. Breath sounds: Normal breath sounds. No wheezing.    Musculoskeletal: Normal range of motion. General: No deformity. Left shoulder: She exhibits normal range of motion, no tenderness and no pain. Skin:     General: Skin is warm and dry. Capillary Refill: Capillary refill takes less than 2 seconds. Neurological:      Mental Status: She is alert and oriented to person, place, and time. Psychiatric:         Behavior: Behavior normal.         On this date 4/9/2021 I have spent 20 minutes reviewing previous notes, test results and face to face with the patient discussing the diagnosis and importance of compliance with the treatment plan as well as documenting on the day of the visit. An electronic signature was used to authenticate this note.     --Marc Rodriguez, REGINA - CNP

## 2021-04-09 NOTE — PATIENT INSTRUCTIONS
Patient Education        Shoulder Arthritis: Exercises  Introduction  Here are some examples of exercises for you to try. The exercises may be suggested for a condition or for rehabilitation. Start each exercise slowly. Ease off the exercises if you start to have pain. You will be told when to start these exercises and which ones will work best for you. How to do the exercises  Shoulder flexion (lying down)   To make a wand for this exercise, use a piece of PVC pipe or a broom handle with the broom removed. Make the wand about a foot wider than your shoulders. 1. Lie on your back, holding a wand with both hands. Your palms should face down as you hold the wand. 2. Keeping your elbows straight, slowly raise your arms over your head. Raise them until you feel a stretch in your shoulders, upper back, and chest.  3. Hold for 15 to 30 seconds. 4. Repeat 2 to 4 times. Shoulder rotation (lying down)   To make a wand for this exercise, use a piece of PVC pipe or a broom handle with the broom removed. Make the wand about a foot wider than your shoulders. 1. Lie on your back. Hold a wand with both hands with your elbows bent and palms up. 2. Keep your elbows close to your body, and move the wand across your body toward the sore arm. 3. Hold for 8 to 12 seconds. 4. Repeat 2 to 4 times. Shoulder internal rotation with towel   1. Hold a towel above and behind your head with the arm that is not sore. 2. With your sore arm, reach behind your back and grasp the towel. 3. With the arm above your head, pull the towel upward. Do this until you feel a stretch on the front and outside of your sore shoulder. 4. Hold 15 to 30 seconds. 5. Repeat 2 to 4 times. Shoulder blade squeeze   1. Stand with your arms at your sides, and squeeze your shoulder blades together. Do not raise your shoulders up as you squeeze. 2. Hold 6 seconds. 3. Repeat 8 to 12 times.     Resisted rows   For this exercise, you will need elastic start to have pain. You will be told when to start these exercises and which ones will work best for you. How to do the exercises  Pendulum swing   If you have pain in your back, do not do this exercise. 5. Hold on to a table or the back of a chair with your good arm. Then bend forward a little and let your sore arm hang straight down. This exercise does not use the arm muscles. Rather, use your legs and your hips to create movement that makes your arm swing freely. 6. Use the movement from your hips and legs to guide the slightly swinging arm back and forth like a pendulum (or elephant trunk). Then guide it in circles that start small (about the size of a dinner plate). Make the circles a bit larger each day, as your pain allows. 7. Do this exercise for 5 minutes, 5 to 7 times each day. 8. As you have less pain, try bending over a little farther to do this exercise. This will increase the amount of movement at your shoulder. Posterior stretching exercise   5. Hold the elbow of your injured arm with your other hand. 6. Use your hand to pull your injured arm gently up and across your body. You will feel a gentle stretch across the back of your injured shoulder. 7. Hold for at least 15 to 30 seconds. Then slowly lower your arm. 8. Repeat 2 to 4 times. Up-the-back stretch   Your doctor or physical therapist may want you to wait to do this stretch until you have regained most of your range of motion and strength. You can do this stretch in different ways. Hold any of these stretches for at least 15 to 30 seconds. Repeat them 2 to 4 times. 6. Light stretch: Put your hand in your back pocket. Let it rest there to stretch your shoulder. 7. Moderate stretch: With your other hand, hold your injured arm (palm outward) behind your back by the wrist. Pull your arm up gently to stretch your shoulder. 8. Advanced stretch: Put a towel over your other shoulder. Put the hand of your injured arm behind your back. Now hold the back end of the towel. With the other hand, hold the front end of the towel in front of your body. Pull gently on the front end of the towel. This will bring your hand farther up your back to stretch your shoulder. Overhead stretch   4. Standing about an arm's length away, grasp onto a solid surface. You could use a countertop, a doorknob, or the back of a sturdy chair. 5. With your knees slightly bent, bend forward with your arms straight. Lower your upper body, and let your shoulders stretch. 6. As your shoulders are able to stretch farther, you may need to take a step or two backward. 7. Hold for at least 15 to 30 seconds. Then stand up and relax. If you had stepped back during your stretch, step forward so you can keep your hands on the solid surface. 8. Repeat 2 to 4 times. Shoulder flexion (lying down)   To make a wand for this exercise, use a piece of PVC pipe or a broom handle with the broom removed. Make the wand about a foot wider than your shoulders. 4. Lie on your back, holding a wand with both hands. Your palms should face down as you hold the wand. 5. Keeping your elbows straight, slowly raise your arms over your head. Raise them until you feel a stretch in your shoulders, upper back, and chest.  6. Hold for 15 to 30 seconds. 7. Repeat 2 to 4 times. Shoulder rotation (lying down)   To make a wand for this exercise, use a piece of PVC pipe or a broom handle with the broom removed. Make the wand about a foot wider than your shoulders. 6. Lie on your back. Hold a wand with both hands with your elbows bent and palms up. 7. Keep your elbows close to your body, and move the wand across your body toward the sore arm. 8. Hold for 8 to 12 seconds. 9. Repeat 2 to 4 times. Wall climbing (to the side)   Avoid any movement that is straight to your side, and be careful not to arch your back. Your arm should stay about 30 degrees to the front of your side.   7. Stand with your side place it under the elbow as you lower your injured arm. Use your good arm to keep your injured arm from dropping down too fast.  3. Repeat 8 to 12 times. 4. When you first start out, don't hold any extra weight in your hand. As you get stronger, you may use a 1-pound to 2-pound dumbbell or a small can of food. Shoulder flexor and extensor exercise   These are isometric exercises. That means you contract your muscles without actually moving. 1. Push forward (flex): Stand facing a wall or doorjamb, about 6 inches or less back. Hold your injured arm against your body. Make a closed fist with your thumb on top. Then gently push your hand forward into the wall with about 25% to 50% of your strength. Don't let your body move backward as you push. Hold for about 6 seconds. Relax for a few seconds. Repeat 8 to 12 times. 2. Push backward (extend): Stand with your back flat against a wall. Your upper arm should be against the wall, with your elbow bent 90 degrees (your hand straight ahead). Push your elbow gently back against the wall with about 25% to 50% of your strength. Don't let your body move forward as you push. Hold for about 6 seconds. Relax for a few seconds. Repeat 8 to 12 times. Scapular exercise: Wall push-ups   This exercise is best done with your fingers somewhat turned out, rather than straight up and down. 1. Stand facing a wall, about 12 inches to 18 inches away. 2. Place your hands on the wall at shoulder height. 3. Slowly bend your elbows and bring your face to the wall. Keep your back and hips straight. 4. Push back to where you started. 5. Repeat 8 to 12 times. 6. When you can do this exercise against a wall comfortably, you can try it against a counter. You can then slowly progress to the end of a couch, then to a sturdy chair, and finally to the floor. Scapular exercise: Retraction   For this exercise, you will need elastic exercise material, such as surgical tubing or Thera-Band.   1. Put the band around a solid object at about waist level. (A bedpost will work well.) Each hand should hold an end of the band. 2. With your elbows at your sides and bent to 90 degrees, pull the band back. Your shoulder blades should move toward each other. Then move your arms back where you started. 3. Repeat 8 to 12 times. 4. If you have good range of motion in your shoulders, try this exercise with your arms lifted out to the sides. Keep your elbows at a 90-degree angle. Raise the elastic band up to about shoulder level. Pull the band back to move your shoulder blades toward each other. Then move your arms back where you started. Internal rotator strengthening exercise   1. Start by tying a piece of elastic exercise material to a doorknob. You can use surgical tubing or Thera-Band. 2. Stand or sit with your shoulder relaxed and your elbow bent 90 degrees. Your upper arm should rest comfortably against your side. Squeeze a rolled towel between your elbow and your body for comfort. This will help keep your arm at your side. 3. Hold one end of the elastic band in the hand of the painful arm. 4. Slowly rotate your forearm toward your body until it touches your belly. Slowly move it back to where you started. 5. Keep your elbow and upper arm firmly tucked against the towel roll or at your side. 6. Repeat 8 to 12 times. External rotator strengthening exercise   1. Start by tying a piece of elastic exercise material to a doorknob. You can use surgical tubing or Thera-Band. (You may also hold one end of the band in each hand.)  2. Stand or sit with your shoulder relaxed and your elbow bent 90 degrees. Your upper arm should rest comfortably against your side. Squeeze a rolled towel between your elbow and your body for comfort. This will help keep your arm at your side. 3. Hold one end of the elastic band with the hand of the painful arm. 4. Start with your forearm across your belly.  Slowly rotate the forearm out away from your body. Keep your elbow and upper arm tucked against the towel roll or the side of your body until you begin to feel tightness in your shoulder. Slowly move your arm back to where you started. 5. Repeat 8 to 12 times. Follow-up care is a key part of your treatment and safety. Be sure to make and go to all appointments, and call your doctor if you are having problems. It's also a good idea to know your test results and keep a list of the medicines you take. Where can you learn more? Go to https://[employer]. org and sign in to your [employer] account. Enter Cristiano Lott in the [facility] box to learn more about \"Rotator Cuff: Exercises. \"     If you do not have an account, please click on the \"Sign Up Now\" link. Current as of: November 16, 2020               Content Version: 12.8  © 3639-1171 [employer], [employer]. Care instructions adapted under license by [facility] ([facility]). If you have questions about a medical condition or this instruction, always ask your healthcare professional. [place] [address] any warranty or liability for your use of this information.

## 2021-04-09 NOTE — PROGRESS NOTES
Six Minute Walk     []  Desaturation Screening []  Endurance Test       Name:  Naman Ontiveros Record Number:  [id number]  Age: 54 y.o. Gender: female  : 1965  Today's Date:  2021  Pulmonary History:Asthma  Home Oxygen Therapy:  room air  Home Respiratory Therapy:None                    6 MINUTE WALK DATA    Modality Time (Minutes) SPO2 RR B/P Heart Rate O2 SOB Pain Level   Rest 2 97 20 112/78 78  0 0   Walk 1 91   94 0 0 0   Walk 2 87   94  0 0   Walk 3 91   79 2 0 0   Walk 4 90   86 2 0 0   Walk 5 89   81 2 0 0   Walk 6 92   86 2 0 0   Recovery 2 97 24 110/78 97 2 0 0     Nazia SOB Scale:  0=Nothing at all; 0.5=Very, very slight; 1=Very slight; 2=Slight; 3=Moderate; 4=Somewhat severe; 5=Severe; 7=Very Severe; 10=Very, very Severe    Exercise Summary:  Distance Walked (feet): 800  Lowest SpO2 During Walk:  87  (FIO2)  2  Walking Pace (Steps per Minute)  96  Stopped or Paused during Walk:  yes    Comments / Reason:  Paused to place 02 on.       Electronically signed by Katelyn Whyte RCP on 2021 at 12:39 PM    7

## 2021-04-10 LAB
BASOPHILS ABSOLUTE: 0.1 K/UL (ref 0–0.2)
BASOPHILS RELATIVE PERCENT: 1 %
EOSINOPHILS ABSOLUTE: 0.2 K/UL (ref 0–0.6)
EOSINOPHILS RELATIVE PERCENT: 2.2 %
HCT VFR BLD CALC: 39.3 % (ref 36–48)
HEMOGLOBIN: 12.6 G/DL (ref 12–16)
LYMPHOCYTES ABSOLUTE: 0.9 K/UL (ref 1–5.1)
LYMPHOCYTES RELATIVE PERCENT: 12 %
MCH RBC QN AUTO: 29.2 PG (ref 26–34)
MCHC RBC AUTO-ENTMCNC: 32.1 G/DL (ref 31–36)
MCV RBC AUTO: 91.2 FL (ref 80–100)
MONOCYTES ABSOLUTE: 0.5 K/UL (ref 0–1.3)
MONOCYTES RELATIVE PERCENT: 6.2 %
NEUTROPHILS ABSOLUTE: 5.9 K/UL (ref 1.7–7.7)
NEUTROPHILS RELATIVE PERCENT: 78.6 %
PDW BLD-RTO: 14.6 % (ref 12.4–15.4)
PLATELET # BLD: 308 K/UL (ref 135–450)
PMV BLD AUTO: 8.3 FL (ref 5–10.5)
RBC # BLD: 4.31 M/UL (ref 4–5.2)
SEDIMENTATION RATE, ERYTHROCYTE: 6 MM/HR (ref 0–30)
WBC # BLD: 7.5 K/UL (ref 4–11)

## 2021-04-11 LAB — ALDOLASE: 29.3 U/L (ref 1.5–8.1)

## 2021-04-12 DIAGNOSIS — R06.02 SOB (SHORTNESS OF BREATH): Primary | ICD-10-CM

## 2021-04-22 ENCOUNTER — HOSPITAL ENCOUNTER (OUTPATIENT)
Dept: PHYSICAL THERAPY | Age: 56
Setting detail: THERAPIES SERIES
Discharge: HOME | End: 2021-04-22
Payer: COMMERCIAL

## 2021-04-22 PROCEDURE — 97161 PT EVAL LOW COMPLEX 20 MIN: CPT | Performed by: PHYSICAL THERAPIST

## 2021-04-22 PROCEDURE — 97140 MANUAL THERAPY 1/> REGIONS: CPT | Performed by: PHYSICAL THERAPIST

## 2021-04-22 PROCEDURE — 97110 THERAPEUTIC EXERCISES: CPT | Performed by: PHYSICAL THERAPIST

## 2021-04-22 NOTE — FLOWSHEET NOTE
The [address] [facility],[address], [address] [facility], [address], [place]       Physical Therapy Daily Treatment Note  Date:  2021    Patient Name:  Maria Isabel Tucker    :  1965  MRN: [id number]  Restrictions/Precautions:    Physician Information:  Referring Practitioner: Mohit  Medical/Treatment Diagnosis Information:  · Diagnosis: M25.512, G89.29 (ICD-10-CM) - Chronic pain in left shoulder  Treatment Diagnosis: Left shoulder pain M25.512, Cervical pain M54.2  [] Conservative / [] Surgical - DOS:   Insurance/Certification information:     Plan of care signed: [] YES  [] NO  Number of Comorbidities:  []0     [x]1-2    []3+      Is this a Progress Report:     []  Yes  [x]  No      If Yes:  Date Range for reporting period:  Beginning 2021  Ending     Progress report will be due (10 Rx or 30 days whichever is less):        Recertification will be due (POC Duration  / 90 days whichever is less): 2021     Visit # Insurance Allowable Requires auth   1 ?     []no        []yes:        Latex Allergy:  [x]NO      []YES  Preferred Language for Healthcare:   [x]English       []other:      SUBJECTIVE:  See eval    OBJECTIVE:    Initial Initial Current   VAS 4-5       UEFI 75/80       ROM Left Right     Shoulder Flex 141       Shoulder Abd 141       Shoulder ER Behind the head       Shoulder IR L p       Strength  Left Right     Shoulder Flex 4+       Shoulder Scap 4       Shoulder ER 4       Shoulder IR 4       THORACIC ROM         flexion         ext     R/L   Rotation R/L WNL WNL R/L   SB R/L            CERV ROM Left Right     Cervical Flexion WNL       Cervical Extension WNL       Cervical SB R/L ~35 R/L   R/L   Cervical rotation R/L ~40 ~55 R/L        Observation:   Palpation:         RESTRICTIONS/PRECAUTIONS:  polymyositis    Exercises/Interventions:   Therapeutic Ex Sets/reps Notes   No money x20    SBS x20                             Pt ed posture, ergonomic set up for home desk, biomechanics of cervical and shoulder dysfunction 15 min                                                           Manual Intervention     GH mobs/PROM 3 min    STW L paraspinals 6 min                             NMR re-education                                                 Therapeutic Exercise and NMR EXR  [x] (90573) Provided verbal/tactile cueing for activities related to strengthening, flexibility, endurance, ROM  for improvements in scapular, scapulothoracic and UE control with self care, reaching, carrying, lifting, house/yardwork, driving/computer work.    [] (89941) Provided verbal/tactile cueing for activities related to improving balance, coordination, kinesthetic sense, posture, motor skill, proprioception  to assist with  scapular, scapulothoracic and UE control with self care, reaching, carrying, lifting, house/yardwork, driving/computer work. Therapeutic Activities:    [] (79759 or 82011) Provided verbal/tactile cueing for activities related to improving balance, coordination, kinesthetic sense, posture, motor skill, proprioception and motor activation to allow for proper function of scapular, scapulothoracic and UE control with self care, carrying, lifting, driving/computer work.      Home Exercise Program:    [x] (65751) Reviewed/Progressed HEP activities related to strengthening, flexibility, endurance, ROM of scapular, scapulothoracic and UE control with self care, reaching, carrying, lifting, house/yardwork, driving/computer work  [] (01224) Reviewed/Progressed HEP activities related to improving balance, coordination, kinesthetic sense, posture, motor skill, proprioception of scapular, scapulothoracic and UE control with self care, reaching, carrying, lifting, house/yardwork, driving/computer work      Manual Treatments:  PROM / STM / Oscillations-Mobs:  G-I, II, III, IV (PA's, Inf., Post.)  [x] (40201) Provided manual therapy to mobilize soft tissue/joints of cervical/CT, scapular GHJ and UE for the purpose of modulating pain, promoting relaxation,  increasing ROM, reducing/eliminating soft tissue swelling/inflammation/restriction, improving soft tissue extensibility and allowing for proper ROM for normal function with self care, reaching, carrying, lifting, house/yardwork, driving/computer work    Modalities:    [] GR/ESU 15 min    [] GR 15 min  [] ESU     [] CP    [] MHP    [] declined    Charges:  Timed Code Treatment Minutes: 30   Total Treatment Minutes: 60     [] EVAL (LOW) 11919 (typically 20 minutes face-to-face)  [] EVAL (MOD) 63037 (typically 30 minutes face-to-face)  [] EVAL (HIGH) 08696 (typically 45 minutes face-to-face)  [] RE-EVAL     [x] HK(99882) x   1  [] IONTO  [] NMR (38685) x     [] VASO  [x] Manual (72797) x   1   [] Other:  [] TA x      [] Mech Traction (59991)  [] ES(attended) (03851)      [] ES (un) (93610):     GOALS:  Long Term Goals: To be achieved in: 6 weeks  1. Disability index score of 75/80or less for the UEFI  to assist with reaching prior level of function. [] Progressing: [] Met: [x] Not Met: [] Adjusted  2. Patient will demonstrate increased AROM to WNL to allow for proper joint functioning as indicated by patients Functional Deficits. [] Progressing: [] Met: [x] Not Met: [] Adjusted  3. Patient will demonstrate an increase in Strength >/=4+/5 to allow for proper functional mobility as indicated by patients Functional Deficits. [] Progressing: [] Met: [x] Not Met: [] Adjusted  4. Patient will return to  driving functional activities without increased symptoms or restriction. [] Progressing: [] Met: [x] Not Met: [] Adjusted  5. Pt will demonstrate appropriate postural awareness with ergonomic set up at home. [] Progressing: [] Met: [x] Not Met: [] Adjusted     Overall Progression Towards Functional goals/ Treatment Progress Update:  [] Patient is progressing as expected towards functional goals listed.     [] Progression is slowed due to complexities/Impairments listed. [] Progression has been slowed due to co-morbidities. [x] Plan just implemented, too soon to assess goals progression <30days   [] Goals require adjustment due to lack of progress  [] Patient is not progressing as expected and requires additional follow up with physician  [] Other    Prognosis for POC: [x] Good [] Fair  [] Poor      Patient requires continued skilled intervention: [x] Yes  [] No      ASSESSMENT:  See eval    Treatment/Activity Tolerance:  [x] Patient tolerated treatment well [] Patient limited by fatique  [] Patient limited by pain  [] Patient limited by other medical complications  [] Other:     Prognosis: [] Good [] Fair  [] Poor    Patient Requires Follow-up: [x] Yes  [] No    PLAN: See eval  [] Continue per plan of care [] Alter current plan (see comments above)  [x] Plan of care initiated [] Hold pending MD visit [] Discharge      Electronically signed by:  Gregory Foster, PT , DPT, CDNT    Note: If patient does not return for scheduled/ recommended follow up visits, this note will serve as a discharge from care along with most recent update on progress.

## 2021-04-22 NOTE — PLAN OF CARE
The [facility] and [facility], [address] [facility], [facility] [place], [place]       Physical Therapy Certification    Dear Referring Practitioner: Brown Silva,    We had the pleasure of evaluating the following patient for physical therapy services at [address] [place] [address]. A summary of our findings can be found in the initial assessment below. This includes our plan of care. If you have any questions or concerns regarding these findings, please do not hesitate to contact me at the office phone number checked above. Thank you for the referral.       Physician Signature:_______________________________Date:__________________  By signing above (or electronic signature), therapists plan is approved by physician      Patient: Alfredo Dukes   : 1965   MRN: [id number]  Referring Physician: Referring Practitioner: Mohit      Evaluation Date: 2021      Medical Diagnosis Information:  Diagnosis: M25.512, G89.29 (ICD-10-CM) - Chronic pain in left shoulder                       Treatment Diagnosis: Left shoulder pain M25.512                      Insurance information:      Precautions/ Contra-indications:  Polymyositis- autoimmune muscle dysfunction; consistently takes prednisone  Latex Allergy:  [x]NO      []YES  Preferred Language for Healthcare:   [x]English       []other:    SUBJECTIVE: Patient stated complaint: Pt reports she had L upper arm pain several years ago and was diagnosed with polymyositis. She reports a few months ago, the pain in her L arm returned. She has had pain that shot up her neck. Pain in the front of her shoulder and sometimes in the back.       Relevant Medical History: polymyositis    Easing factors:  nothing  Provocative factors:  Pushing up from bed ; driving    Height Weight  Type: []Constant   [x]Intermittent  []Radiating []Localized []other:     Numbness/Tingling: sometimes in hands upon waking- B CTS    Occupation/School: sitting at desk    Living Status/Prior Level of Function: Independent with ADLs and IADLs, jazzercise      OBJECTIVE:      Initial Initial Current   VAS 4-5     UEFI 75/80     ROM Left Right    Shoulder Flex 141     Shoulder Abd 141     Shoulder ER Behind the head     Shoulder IR L p     Strength  Left Right    Shoulder Flex 4+     Shoulder Scap 4     Shoulder ER 4     Shoulder IR 4     THORACIC ROM      flexion      ext   R/L   Rotation R/L WNL WNL R/L   SB R/L        CERV ROM Left Right    Cervical Flexion WNL     Cervical Extension WNL     Cervical SB R/L ~35 R/L  R/L   Cervical rotation R/L ~40 ~55 R/L     Reflexes/Sensation:    []Dermatomes/Myotomes intact    []Reflexes equal and normal bilaterally   []Other:    Joint mobility:  Restricted GH mobs, cervical PAs, elevated 1st rib   []Normal    [x]Hypo   []Hyper    Palpation:  Spasm L paraspinals,LHBT, pec tension, IS tension    Functional Mobility/Transfers:  inidependent    Posture: Forward head    Bandages/Dressings/Incisions:  NA    Gait: (include devices/WB status)  WNL    Orthopedic Special Tests:   spurlings - R, + L for tightness   Hawkin's Antony    Sulcus Sign    Load and Shift    Anterior Apprehension/Relocation    Champagne test -   Empty Can -   Lift Off -   Belly Press -                        [x] Patient history, allergies, meds reviewed. Medical chart reviewed. See intake form. Review Of Systems (ROS):  [x]Performed Review of systems (Integumentary, CardioPulmonary, Neurological) by intake and observation. Intake form has been scanned into medical record. Patient has been instructed to contact their primary care physician regarding ROS issues if not already being addressed at this time.       Co-morbidities/Complexities (which will affect course of rehabilitation):   []None           Arthritic conditions   []Rheumatoid arthritis (M05.9)  []Osteoarthritis (M19.91)   Cardiovascular conditions   []Hypertension (I10)  []Hyperlipidemia (E78.5)  []Angina pectoris (I20)  []Atherosclerosis (I70)   Musculoskeletal conditions   []Disc pathology   []Congenital spine pathologies   []Prior surgical intervention  []Osteoporosis (M81.8)  []Osteopenia (M85.8)   Endocrine conditions   []Hypothyroid (E03.9)  []Hyperthyroid Gastrointestinal conditions   []Constipation (N59.06)   Metabolic conditions   []Morbid obesity (E66.01)  []Diabetes type 1(E10.65) or 2 (E11.65)   []Neuropathy (G60.9)     Pulmonary conditions   []Asthma (J45)  []Coughing   []COPD (J44.9)   Psychological Disorders  []Anxiety (F41.9)  []Depression (F32.9)   []Other:   [x]Other:   polymyositis       Barriers to/and or personal factors that will affect rehab potential:              []Age  []Sex              []Motivation/Lack of Motivation                        []Co-Morbidities              []Cognitive Function, education/learning barriers              []Environmental, home barriers              []profession/work barriers  []past PT/medical experience  []other:  Justification:  Pt will do well    Falls Risk Assessment (30 days):   [] Falls Risk assessed and no intervention required. [] Falls Risk assessed and Patient requires intervention due to being higher risk   TUG score (>12s at risk):     [] Falls education provided. G-Codes:       ASSESSMENT:  Pt is a 54year old female with a several month h/o L shoulder and neck pain. She presents with deficits in both and demonstrates more cervical dysfunction causing deficits in shoulder mechanics. She will benefit from PT to address these issues and return to PLOF.     Functional Impairments   [x]Noted spinal or UE joint hypomobility   []Noted spinal or UE joint hypermobility   [x]Decreased UE functional ROM   []Decreased UE functional strength   []Abnormal reflexes/sensation/myotomal/dermatomal deficits   [x]Decreased RC/scapular/core strength and neuromuscular control   []other:      Functional Activity Limitations with:  [] no personal factors and/or comorbidities that impact the plan of care;  [x]1-2 personal factors and/or comorbidities that impact the plan of care  []3 personal factors and/or comorbidities that impact the plan of care  [x] An examination of body systems using standardized tests and measures addressing any of the following: body structures and functions (impairments), activity limitations, and/or participation restrictions;:  [x] a total of 1-2 or more elements   [] a total of 3 or more elements   [] a total of 4 or more elements   [x] A clinical presentation with:  [x] stable and/or uncomplicated characteristics   [] evolving clinical presentation with changing characteristics  [] unstable and unpredictable characteristics;   [x] Clinical decision making of [x] low, [] moderate, [] high complexity using standardized patient assessment instrument and/or measurable assessment of functional outcome. [x] EVAL (LOW) 59632 (typically 20 minutes face-to-face)  [] EVAL (MOD) 66113 (typically 30 minutes face-to-face)  [] EVAL (HIGH) 07859 (typically 45 minutes face-to-face)  [] RE-EVAL     PLAN:  Frequency/Duration:  1 days per week for 6 Weeks:  INTERVENTIONS:  [x] Therapeutic exercise including: strength training, ROM, for Upper extremity and core   [x]  NMR activation and proprioception for UE, scap and Core   [x] Manual therapy as indicated for shoulder, scapula and spine to include: Dry Needling/IASTM, STM, PROM, Gr I-IV mobilizations, manipulation. [x] Modalities as needed that may include: thermal agents, E-stim, Biofeedback, US, iontophoresis as indicated  [x] Patient education on joint protection, postural re-education, activity modification, progression of HEP. HEP instruction: Discussed HEP and patient given handout. GOALS:  Patient stated goal:  Control pain  [] Progressing: [] Met: [x] Not Met: [] Adjusted    Therapist goals for Patient:   Short Term Goals: To be achieved in: 2 weeks  1. Independent in HEP and progression per patient tolerance, in order to prevent re-injury. [] Progressing: [] Met: [x] Not Met: [] Adjusted  2. Patient will have a decrease in pain to facilitate improvement in movement, function, and ADLs as indicated by Functional Deficits. [] Progressing: [] Met: [x] Not Met: [] Adjusted      Long Term Goals: To be achieved in: 6 weeks  1. Disability index score of 75/80or less for the UEFI  to assist with reaching prior level of function. [] Progressing: [] Met: [x] Not Met: [] Adjusted  2. Patient will demonstrate increased AROM to WNL to allow for proper joint functioning as indicated by patients Functional Deficits. [] Progressing: [] Met: [x] Not Met: [] Adjusted  3. Patient will demonstrate an increase in Strength >/=4+/5 to allow for proper functional mobility as indicated by patients Functional Deficits. [] Progressing: [] Met: [x] Not Met: [] Adjusted  4. Patient will return to  driving functional activities without increased symptoms or restriction. [] Progressing: [] Met: [x] Not Met: [] Adjusted  5. Pt will demonstrate appropriate postural awareness with ergonomic set up at home.   [] Progressing: [] Met: [x] Not Met: [] Adjusted      Electronically signed by:  Angelika Vergara, PT , DPT, CDNT

## 2021-04-23 ENCOUNTER — OFFICE VISIT (OUTPATIENT)
Dept: RHEUMATOLOGY | Age: 56
End: 2021-04-23
Payer: COMMERCIAL

## 2021-04-23 VITALS — BODY MASS INDEX: 27.48 KG/M2 | HEIGHT: 60 IN | WEIGHT: 140 LBS | TEMPERATURE: 98.7 F

## 2021-04-23 DIAGNOSIS — M85.89 OSTEOPENIA OF MULTIPLE SITES: ICD-10-CM

## 2021-04-23 DIAGNOSIS — Z79.899 HIGH RISK MEDICATION USE: ICD-10-CM

## 2021-04-23 DIAGNOSIS — D89.89 ANTISYNTHETASE SYNDROME (HCC): Primary | ICD-10-CM

## 2021-04-23 DIAGNOSIS — J84.9 ILD (INTERSTITIAL LUNG DISEASE) (HCC): ICD-10-CM

## 2021-04-23 DIAGNOSIS — M19.90 INFLAMMATORY ARTHRITIS: ICD-10-CM

## 2021-04-23 DIAGNOSIS — D89.89 ANTISYNTHETASE SYNDROME (HCC): ICD-10-CM

## 2021-04-23 LAB
HEPATITIS B SURFACE ANTIGEN INTERPRETATION: NORMAL
HEPATITIS C ANTIBODY INTERPRETATION: NORMAL

## 2021-04-23 PROCEDURE — G8419 CALC BMI OUT NRM PARAM NOF/U: HCPCS | Performed by: INTERNAL MEDICINE

## 2021-04-23 PROCEDURE — 99215 OFFICE O/P EST HI 40 MIN: CPT | Performed by: INTERNAL MEDICINE

## 2021-04-23 PROCEDURE — 1036F TOBACCO NON-USER: CPT | Performed by: INTERNAL MEDICINE

## 2021-04-23 PROCEDURE — G8427 DOCREV CUR MEDS BY ELIG CLIN: HCPCS | Performed by: INTERNAL MEDICINE

## 2021-04-23 PROCEDURE — 3017F COLORECTAL CA SCREEN DOC REV: CPT | Performed by: INTERNAL MEDICINE

## 2021-04-23 RX ORDER — MEPERIDINE HYDROCHLORIDE 50 MG/ML
12.5 INJECTION INTRAMUSCULAR; INTRAVENOUS; SUBCUTANEOUS ONCE
Status: CANCELLED | OUTPATIENT
Start: 2021-05-07 | End: 2021-05-07

## 2021-04-23 RX ORDER — SULFAMETHOXAZOLE AND TRIMETHOPRIM 800; 160 MG/1; MG/1
TABLET ORAL
Qty: 36 TABLET | Refills: 1 | Status: SHIPPED | OUTPATIENT
Start: 2021-04-23 | End: 2021-08-27

## 2021-04-23 RX ORDER — MYCOPHENOLATE MOFETIL 500 MG/1
TABLET ORAL
Qty: 180 TABLET | Refills: 3 | Status: SHIPPED | OUTPATIENT
Start: 2021-04-23 | End: 2021-08-23

## 2021-04-23 RX ORDER — DIPHENHYDRAMINE HYDROCHLORIDE 50 MG/ML
25 INJECTION INTRAMUSCULAR; INTRAVENOUS ONCE
Status: CANCELLED | OUTPATIENT
Start: 2021-05-07 | End: 2021-05-07

## 2021-04-23 RX ORDER — EPINEPHRINE 1 MG/ML
0.3 INJECTION, SOLUTION, CONCENTRATE INTRAVENOUS PRN
Status: CANCELLED | OUTPATIENT
Start: 2021-05-07

## 2021-04-23 RX ORDER — ACETAMINOPHEN 325 MG/1
650 TABLET ORAL ONCE
Status: CANCELLED | OUTPATIENT
Start: 2021-05-07 | End: 2021-05-07

## 2021-04-23 RX ORDER — DIPHENHYDRAMINE HYDROCHLORIDE 50 MG/ML
50 INJECTION INTRAMUSCULAR; INTRAVENOUS ONCE
Status: CANCELLED | OUTPATIENT
Start: 2021-05-07 | End: 2021-05-07

## 2021-04-23 RX ORDER — PREDNISONE 20 MG/1
TABLET ORAL
Qty: 90 TABLET | Refills: 0 | Status: SHIPPED | OUTPATIENT
Start: 2021-04-23 | End: 2021-07-26

## 2021-04-23 RX ORDER — SODIUM CHLORIDE 9 MG/ML
INJECTION, SOLUTION INTRAVENOUS CONTINUOUS
Status: CANCELLED | OUTPATIENT
Start: 2021-05-07

## 2021-04-23 RX ORDER — SODIUM CHLORIDE 0.9 % (FLUSH) 0.9 %
5-40 SYRINGE (ML) INJECTION PRN
Status: CANCELLED | OUTPATIENT
Start: 2021-05-07

## 2021-04-23 RX ORDER — ZOLEDRONIC ACID 5 MG/100ML
5 INJECTION, SOLUTION INTRAVENOUS ONCE
Qty: 100 ML | Refills: 0
Start: 2021-04-23 | End: 2022-09-07

## 2021-04-23 RX ORDER — METHYLPREDNISOLONE SODIUM SUCCINATE 125 MG/2ML
125 INJECTION, POWDER, LYOPHILIZED, FOR SOLUTION INTRAMUSCULAR; INTRAVENOUS ONCE
Status: CANCELLED | OUTPATIENT
Start: 2021-05-07 | End: 2021-05-07

## 2021-04-23 RX ORDER — HEPARIN SODIUM (PORCINE) LOCK FLUSH IV SOLN 100 UNIT/ML 100 UNIT/ML
500 SOLUTION INTRAVENOUS PRN
Status: CANCELLED | OUTPATIENT
Start: 2021-05-07

## 2021-04-23 RX ORDER — METHYLPREDNISOLONE SODIUM SUCCINATE 125 MG/2ML
100 INJECTION, POWDER, LYOPHILIZED, FOR SOLUTION INTRAMUSCULAR; INTRAVENOUS ONCE
Status: CANCELLED | OUTPATIENT
Start: 2021-05-07 | End: 2021-05-07

## 2021-04-23 RX ORDER — SODIUM CHLORIDE 9 MG/ML
25 INJECTION, SOLUTION INTRAVENOUS PRN
Status: CANCELLED | OUTPATIENT
Start: 2021-05-07

## 2021-04-23 NOTE — PROGRESS NOTES
[address], [place]                                                                                                                         [phone] (u) [phone] (F)      Dear Dr. Nicole Handler, APRN - CNP:  Please find Rheumatology assessment. Thank you for giving me the opportunity to be involved in Linda Hays's care and I look forward following Linda along with you. If you have any questions or concerns please feel free to reach me. Note is transcribed using voice recognition software. Inadvertent computerized transcription errors may be present. Patient identification: Tomas Hays,: ,61 y.o. Sex: female     A/P  Linda was seen today for follow-up. 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[facility] [address] [facility]  -     Quantiferon, Incubated; Future  -     Immunoglobulins, Quantitative; Future  -     CT CHEST HIGH RESOLUTION; Future    High risk medication use  -     Hepatitis B Surface Antigen; Future  -     Hepatitis C Antibody; Future    [facility]interstitial [facility]  -     CT CHEST HIGH RESOLUTION; Future  -     Hollie Huang MD, Pulmonary, [place]    Inflammatory arthritis    Other orders  -     predniSONE (DELTASONE) 20 MG tablet; Take 1 Daily. 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 A/P from Today's visit-    1. 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   21- PFT-FVC 60% predicted, TLC 79% predicted, DLCO 54% denies any shortness of breath with daily chores or going a flight of stairs. Continues to have musculoskeletal discomfort-pain, stiffness, swelling in her finger joints, is getting worse as she is tapering prednisone. Left shoulder pain is mechanical is followed by physical therapy. Rash is persistent in her face as well as scalp. No fever, chills, infections, dysphagia, diplopia or any overt weakness in her muscles. Tolerating medications well. All other review of systems are negative. No family history of autoimmune diseases    Current Outpatient Medications   Medication Sig Dispense Refill    predniSONE (DELTASONE) 20 MG tablet Take 1 Daily. 90 tablet 0    mycophenolate (CELLCEPT) 500 MG tablet Take 3 tab po  tablet 3    Apremilast 10 & 20 & 30 MG TBPK As per starter pack directions. 1 each 0    Apremilast (OTEZLA) 30 MG TABS Take 30 mg by mouth 2 times daily Start after completing the starter pack. 60 tablet 3    levothyroxine (SYNTHROID) 100 MCG tablet TAKE 1 TABLET DAILY 90 tablet 0    levothyroxine (SYNTHROID) 100 MCG tablet TAKE 1 TABLET DAILY 90 tablet 0    hydroxychloroquine (PLAQUENIL) 200 MG tablet TAKE ONE TABLET BY MOUTH TWICE A DAY 60 tablet 1    mycophenolate (CELLCEPT) 500 MG tablet Take 3 tablets by mouth 2 times daily 180 tablet 2    predniSONE (DELTASONE) 5 MG tablet Take 2 tab po daily x 15 days, take1.5 mg po daily x 15 days then take 1 tab po daily. 120 tablet 1    Lactobacillus (PROBIOTIC ACIDOPHILUS) CAPS Take one capsule once daily 30 capsule 5    clobetasol (TEMOVATE) 0.05 % external solution Apply a small amount daily for 2 weeks or until improved.  100 mL 3    clobetasol (TEMOVATE) 0.05 % ointment Apply to affected area twice daily on body and hands 60 g 2    triamcinolone (KENALOG) 0.025 % cream Apply to affected area on face twice daily 80 g 2    ibuprofen (ADVIL;MOTRIN) 600 MG tablet Take 1 tablet by mouth every 6 hours as needed for Pain 60 tablet 1    ALBUTEROL SULFATE IN Inhale into the lungs as needed       No current facility-administered medications for this visit. Allergies   Allergen Reactions    Wasp Venom Other (See Comments)       PHYSICAL EXAM:    Vitals:    Temp 98.7 °F (37.1 °C) (Skin)   Ht 5' (1.524 m)   Wt 140 lb (63.5 kg)   LMP 09/12/2013   BMI 27.34 kg/m²   General appearance/ Psychiatric: well nourished, and well groomed, normal judgement, alert, appears stated age and cooperative. MKS: Other than subjective discomfort in her left shoulder and arthralgias across the MCPs and PIPs, no objective finding appreciated in physical examination. No focal muscle strength noted in upper and lower extremities. She does have mild erythema in her forehead, cheeks and anterior neck. Otherwise no overt rashes are seen. Chest-clear to auscultation, equal air entry bilaterally. S1-S2 regular, no added sounds.     DATA:   Lab Results   Component Value Date    WBC 7.5 04/09/2021    HGB 12.6 04/09/2021    HCT 39.3 04/09/2021    MCV 91.2 04/09/2021     04/09/2021         Chemistry        Component Value Date/Time     04/09/2021 1347    K 3.8 04/09/2021 1347     04/09/2021 1347    CO2 25 04/09/2021 1347    BUN 9 04/09/2021 1347    CREATININE 0.6 04/09/2021 1347        Component Value Date/Time    CALCIUM 8.8 04/09/2021 1347    ALKPHOS 67 04/09/2021 1347    AST 40 (H) 04/09/2021 1347    ALT 29 04/09/2021 1347    BILITOT <0.2 04/09/2021 1347          No results found for: [facility]  Lab Results   Component Value Date    SEDRATE 6 04/09/2021     Lab Results   Component Value Date    CRP 5.8 (H) 04/09/2021     Lab Results   Component Value Date    MICHELLE POSITIVE (A) 06/12/2019    SEDRATE 6 04/09/2021     Lab Results   Component Value Date    CKTOTAL 1,708 (H) 04/09/2021     Lab Results   Component Value Date    TSH 2.40 07/26/2018     Lab Results   Component Value Date    VITD25 22.5 (L) 08/19/2020         Radiology Review:    7/9/19- IMPRESSION:  Mild restrictive ventilatory defect. Finding may be  consistent with neuromuscular chest wall, pleural, or parenchymal  disorder, including mild obesity.     A/P- See above. Total time>60  minutes that includes the following-  Preparing to see the patient such as reviewing patients records, pre-charting, preparing the visit on the same day, performing a medically appropriate history and physical examination, counseling and educating patient about diagnosis, management plan, ordering appropriate testings, prescriptions, communicating findings to other care providers, and documenting clinical information in electronic medical record.

## 2021-04-23 NOTE — PATIENT INSTRUCTIONS
Patient Education        rituximab  Pronunciation:  ri TUX i mab  Brand:  Rituxan, Ruxience, Truxima  What is the most important information I should know about rituximab? Rituximab may cause a serious brain infection that can lead to disability or death. Call your doctor right away if you have problems with speech, thought, vision, or muscle movement. These symptoms may start gradually and get worse quickly. Tell your doctor if you have ever had hepatitis B. Rituximab can cause this condition to come back or get worse. Severe skin problems can also occur during treatment with rituximab. Call your doctor if you have painful skin or mouth sores, or a severe skin rash with blistering, peeling, or pus. Some side effects may occur during the injection or within 24 hours afterward. Tell your caregiver right away  if you feel itchy, dizzy, weak, light-headed, short of breath, or if you have chest pain, wheezing, sudden cough, or pounding heartbeats or fluttering in your chest.  What is rituximab? Rituximab is used alone or in combination with other medicines to treat the following conditions in adults:  · non-Hodgkin's lymphoma or chronic lymphocytic leukemia;  · rheumatoid arthritis; or  · pemphigus vulgaris--a severe autoimmune reaction that causes blisters and breakdown of the skin and mucous membranes. Rituximab is also used in adults and children at least 3years old with certain rare disorders that cause inflammation of blood vessels and other tissues in the body. Rituximab may also be used for purposes not listed in this medication guide. What should I discuss with my healthcare provider before receiving rituximab? Rituximab may cause a serious brain infection that can lead to disability or death. This infection may be more likely if have used an immunosuppressant drug in the past, or if you have received rituximab with a stem cell transplant.   Tell your doctor if you have ever had:  · liver disease or hepatitis (or if you are a carrier of hepatitis B);  · an infection, including herpes, shingles, cytomegalovirus, chickenpox, parvovirus, West Nile virus, or hepatitis B or C;  · kidney disease;  · lung disease or a breathing disorder;  · a weak immune system (caused by disease or by using certain medicines);  · heart disease, angina (chest pain), or heart rhythm disorder; or  · if you have used rituximab in the past, or you have had a severe allergic reaction to rituximab. You should be up-to-date on any needed immunizations before starting treatment with rituximab. Tell your doctor if you (or a child receiving rituximab) have received any vaccines within the past 4 weeks. Do not use rituximab if you are pregnant. It could harm the unborn baby. Use effective birth control to prevent pregnancy while you are using this medicine and for at least 12 months after your last dose. Do not breastfeed while using this medicine,  and for at least 6 months after your last dose. How is rituximab given? Your doctor will perform blood tests to make sure you do not have conditions that would prevent you from safely using rituximab. Rituximab is given as an infusion into a vein. A healthcare provider will give you this injection. Rituximab is not given daily. Your schedule will depend on the condition being treated. Follow your doctor's dosing instructions very carefully. Before each injection, you may be given other medications to prevent certain side effects of rituximab. You will need frequent medical tests. If you've ever had hepatitis B, using rituximab can cause this virus to become active or get worse. You may need frequent liver function tests while using this medicine and for several months after you stop. If you need surgery, tell the surgeon ahead of time that you are using rituximab. What happens if I miss a dose? Call your doctor if you miss an appointment for your rituximab injection.   What happens if I overdose? Since this medication is given by a healthcare professional in a medical setting, an overdose is unlikely to occur. What should I avoid while receiving rituximab? Do not receive a \"live\" vaccine while using rituximab. Live vaccines include measles, mumps, rubella (MMR), rotavirus, typhoid, yellow fever, varicella (chickenpox), zoster (shingles), and nasal flu (influenza) vaccine. What are the possible side effects of rituximab? Get emergency medical help if you have signs of an allergic reaction (hives, difficult breathing, swelling in your face or throat) or a severe skin reaction (fever, sore throat, burning eyes, skin pain, red or purple skin rash with blistering and peeling). Some side effects may occur during the injection (or within 24 hours afterward). Tell your caregiver right away  if you feel itchy, dizzy, weak, light-headed, short of breath, or if you have chest pain, wheezing, sudden cough, or pounding heartbeats or fluttering in your chest.  Rituximab may cause a serious brain infection that can lead to disability or death. Call your doctor right away if you have any of the following symptoms (which may start gradually and get worse quickly):  · confusion, memory problems, or other changes in your mental state;  · weakness on one side of your body;  · vision changes; or  · problems with speech or walking. Call your doctor at once if you have any of these other side effects, even if they occur several months after you receive rituximab, or after your treatment ends.   · painful skin or mouth sores, or a severe skin rash with blistering, peeling, or pus;  · redness, warmth, or swelling of the skin;  · severe stomach pain, vomiting, constipation, bloody or tarry stools;  · irregular heartbeats, chest pain or pressure, pain spreading to your jaw or shoulder;  · tiredness or jaundice (yellowing of the skin or eyes);  · signs of infection --fever, chills, cold or flu symptoms, cough, sore throat, mouth sores, headache, earache, pain or burning when you urinate; or  · signs of tumor cell breakdown --confusion, weakness, muscle cramps, nausea, vomiting, fast or slow heart rate, decreased urination, tingling in your hands and feet or around your mouth. Common side effects may include:  · low white and red blood cells (fever, chills, body aches, pale skin, unusual tiredness, infections);  · nausea, diarrhea;  · swelling in your hands or feet;  · headache, weakness;  · painful urination;  · muscle spasms;  · depressed mood; or  · cold symptoms such as stuffy nose, sneezing, sore throat. This is not a complete list of side effects and others may occur. Call your doctor for medical advice about side effects. You may report side effects to FDA at 4-369-FDA-0694. What other drugs will affect rituximab? Tell your doctor about all your other medicines, especially:  · medicines to treat conditions such as rheumatoid arthritis, Crohn's disease, ulcerative colitis, or psoriasis --adalimumab, certolizumab, etanercept, golimumab, infliximab, leflunomide, methotrexate, sulfasalazine, tocilizumab, tofacitinib, and others. This list is not complete. Other drugs may affect rituximab, including prescription and over-the-counter medicines, vitamins, and herbal products. Not all possible drug interactions are listed here. Where can I get more information? Your doctor or pharmacist can provide more information about rituximab. Remember, keep this and all other medicines out of the reach of children, never share your medicines with others, and use this medication only for the indication prescribed. Every effort has been made to ensure that the information provided by [address] is accurate, up-to-date, and complete, but no guarantee is made to that effect. Drug information contained herein may be time sensitive.  [employer] information has been compiled for use by healthcare practitioners and

## 2021-04-26 LAB
MISCELLANEOUS LAB TEST ORDER: NORMAL
QUANTI TB GOLD PLUS: NEGATIVE
QUANTI TB1 MINUS NIL: 0 IU/ML (ref 0–0.34)
QUANTI TB2 MINUS NIL: 0 IU/ML (ref 0–0.34)
QUANTIFERON MITOGEN: 0.75 IU/ML
QUANTIFERON NIL: 0.01 IU/ML

## 2021-04-28 DIAGNOSIS — D89.89 ANTISYNTHETASE SYNDROME DUE TO DERMATOMYOSITIS (HCC): ICD-10-CM

## 2021-04-28 DIAGNOSIS — J84.9 ILD (INTERSTITIAL LUNG DISEASE) (HCC): Primary | ICD-10-CM

## 2021-04-28 DIAGNOSIS — M33.90 ANTISYNTHETASE SYNDROME DUE TO DERMATOMYOSITIS (HCC): ICD-10-CM

## 2021-05-05 ENCOUNTER — OFFICE VISIT (OUTPATIENT)
Dept: PULMONOLOGY | Age: 56
End: 2021-05-05
Payer: COMMERCIAL

## 2021-05-05 VITALS
HEIGHT: 60 IN | WEIGHT: 138 LBS | SYSTOLIC BLOOD PRESSURE: 108 MMHG | RESPIRATION RATE: 16 BRPM | OXYGEN SATURATION: 98 % | DIASTOLIC BLOOD PRESSURE: 64 MMHG | BODY MASS INDEX: 27.09 KG/M2 | HEART RATE: 77 BPM

## 2021-05-05 DIAGNOSIS — J84.9 ILD (INTERSTITIAL LUNG DISEASE) (HCC): Primary | ICD-10-CM

## 2021-05-05 DIAGNOSIS — M33.20 POLYMYOSITIS (HCC): ICD-10-CM

## 2021-05-05 PROCEDURE — G8427 DOCREV CUR MEDS BY ELIG CLIN: HCPCS | Performed by: INTERNAL MEDICINE

## 2021-05-05 PROCEDURE — 1036F TOBACCO NON-USER: CPT | Performed by: INTERNAL MEDICINE

## 2021-05-05 PROCEDURE — G8419 CALC BMI OUT NRM PARAM NOF/U: HCPCS | Performed by: INTERNAL MEDICINE

## 2021-05-05 PROCEDURE — 3017F COLORECTAL CA SCREEN DOC REV: CPT | Performed by: INTERNAL MEDICINE

## 2021-05-05 PROCEDURE — 99204 OFFICE O/P NEW MOD 45 MIN: CPT | Performed by: INTERNAL MEDICINE

## 2021-05-05 RX ORDER — HYDROXYCHLOROQUINE SULFATE 200 MG/1
TABLET, FILM COATED ORAL
Qty: 180 TABLET | Refills: 1 | Status: SHIPPED | OUTPATIENT
Start: 2021-05-05 | End: 2021-08-27

## 2021-05-05 NOTE — PROGRESS NOTES
[facility] Pulmonary and Critical Care    Outpatient Note    Subjective:   CHIEF COMPLAINT:   Chief Complaint   Patient presents with    New Patient       HPI:     The patient is 54 y.o. female who presents today for a new patient visit for evaluation of ILD. She had myositis started 2 years ago. She follows with Dr. Cordell Salinas every 6-8 weeks. She was complaining of arm pain for which she had an X ray, were she was found to have lung abnormalities followed by high res CT and pulmonary function test.      At this time there is no SOB at rest, however she does have HERNANDEZ if going up stairs or if walking up hills. She does aerobics twice a week without any problem. There is no cough. There is no fever or chills. She gets cold often but she attributes it to hypothyroidism. She has some form of dermatitis since she was a child. Two years ago she started having arm pain, followed by hand and fingers swelling, and was diagnosed with mechanics hand and antisythetase syndrome. She was also having flair of scalp and neck dermatitis consistent with dermatomyositis. Years ago she was on albuterol but after deviated septum surgery she was able to come off albuterol. Hasn't used for ~10 years. Never smoker, however she is a passive smoker.       Past Medical History:    Past Medical History:   Diagnosis Date    Allergic rhinitis     Arthritis     Asthma     Chronic sinusitis     Dermatitis     Hypothyroidism     Polymyositis (HCC)        Social History:    Social History     Tobacco Use   Smoking Status Passive Smoke Exposure - Never Smoker   Smokeless Tobacco Never Used       Family History:  Family History   Problem Relation Age of Onset    Heart Disease Mother         afib    Stroke Mother     Atrial Fibrillation Mother     COPD Mother     Diabetes Father        Current Medications:  Current Outpatient Medications on File Prior to Visit   Medication Sig Dispense Refill    predniSONE (DELTASONE) 20 MG tablet Take 1 Daily. 90 tablet 0    mycophenolate (CELLCEPT) 500 MG tablet Take 3 tab po  tablet 3    sulfamethoxazole-trimethoprim (BACTRIM DS;SEPTRA DS) 800-160 MG per tablet Take 1 tab three times a week. M/W/F 36 tablet 1    Apremilast 10 & 20 & 30 MG TBPK As per starter pack directions. 1 each 0    Apremilast (OTEZLA) 30 MG TABS Take 30 mg by mouth 2 times daily Start after completing the starter pack. 60 tablet 3    levothyroxine (SYNTHROID) 100 MCG tablet TAKE 1 TABLET DAILY 90 tablet 0    levothyroxine (SYNTHROID) 100 MCG tablet TAKE 1 TABLET DAILY 90 tablet 0    mycophenolate (CELLCEPT) 500 MG tablet Take 3 tablets by mouth 2 times daily 180 tablet 2    predniSONE (DELTASONE) 5 MG tablet Take 2 tab po daily x 15 days, take1.5 mg po daily x 15 days then take 1 tab po daily. 120 tablet 1    Lactobacillus (PROBIOTIC ACIDOPHILUS) CAPS Take one capsule once daily 30 capsule 5    clobetasol (TEMOVATE) 0.05 % external solution Apply a small amount daily for 2 weeks or until improved. 100 mL 3    clobetasol (TEMOVATE) 0.05 % ointment Apply to affected area twice daily on body and hands 60 g 2    triamcinolone (KENALOG) 0.025 % cream Apply to affected area on face twice daily 80 g 2    ibuprofen (ADVIL;MOTRIN) 600 MG tablet Take 1 tablet by mouth every 6 hours as needed for Pain 60 tablet 1    ALBUTEROL SULFATE IN Inhale into the lungs as needed      zoledronic acid (RECLAST) 5 MG/100ML SOLN Infuse 100 mLs intravenously once for 1 dose 100 mL 0     No current facility-administered medications on file prior to visit. REVIEW OF SYSTEMS:    Review of Systems   Constitutional: Negative for chills, fatigue, fever and unexpected weight change. HENT: Negative for congestion. Respiratory: Positive for shortness of breath (HERNANDEZ). Negative for cough, chest tightness and wheezing. Cardiovascular: Negative for chest pain, palpitations and leg swelling. Musculoskeletal: Positive for myalgias (mainly in left arm) and neck pain. Skin: Positive for rash. Neurological: Negative for weakness. Psychiatric/Behavioral: The patient is not nervous/anxious. All other systems reviewed and are negative. Objective:   PHYSICAL EXAM:        VITALS:  /64 (Site: Right Upper Arm, Position: Sitting, Cuff Size: Medium Adult)   Pulse 77   Resp 16   Ht 5' (1.524 m)   Wt 138 lb (62.6 kg)   LMP 09/12/2013   SpO2 98%   Breastfeeding No   BMI 26.95 kg/m²     Physical Exam  Vitals signs reviewed. Constitutional:       Appearance: She is well-developed. HENT:      Head: Normocephalic and atraumatic. Eyes:      Extraocular Movements: Extraocular movements intact. Pupils: Pupils are equal, round, and reactive to light. Neck:      Musculoskeletal: Neck supple. Vascular: No JVD. Cardiovascular:      Rate and Rhythm: Normal rate and regular rhythm. Heart sounds: No murmur. Pulmonary:      Effort: Pulmonary effort is normal. No respiratory distress. Breath sounds: No stridor. Rales present. No wheezing. Abdominal:      General: Bowel sounds are normal.      Palpations: Abdomen is soft. Musculoskeletal:         General: No deformity. Right lower leg: No edema. Left lower leg: No edema. Skin:     General: Skin is warm and dry. Neurological:      Mental Status: She is alert and oriented to person, place, and time.    Psychiatric:         Behavior: Behavior normal.         DATA:    CT chest on 5/3/21  HISTORY:  54year-old female with anti-synthesize syndrome with shortness of breath and    abnormal PFT.       TECHNICAL FACTORS:  Standard CT technique was utilized.       COMPARISON:  None.       FINDINGS:  Trachea and bilateral bronchi are clear.       Lower lobe dominant reticular/linear opacities in the subpleural regions along with honeycomb    pattern in bilateral lower lobes and associated mild traction the test.     Pulse ox is 97% on room air     Estimated body mass index is 27.34 kg/m² as calculated from the   following:     Height as of 9/14/20: 5' (1.524 m).     Weight as of an earlier encounter on 4/9/21: 140 lb (63.5 kg). Spirometry data:     FEV1/FVC: 86. Predicted ratio 79     Pre-Bronchodilator FEV1 1.53L, which is 66% predicted     Post-Bronchodilator FEV1 1.68L, which is 72% predicted     There is 9% reversibility     FVC is 1.78L, which is 60% predicted     Lung Volumes:     TLC (by Plethysmography) is 3.14L, which is 79% predicted     RV is 1.38L which is 81% predicted     Diffusion Capacity:     DLCO is 12.3 which is 54% predicted     Impression:     1. There is no obstruction present     2. There is no significant reversibility with bronchodilator         [Increase in FEV1 => 12% of control and => 200 ml]     3. There is no significant hyperinflation or air trapping     4. There is moderate restriction     5. There is moderate reduction in diffusion capacity     Comment:   Moderate restrictive disease with moderate reduction in diffusion   capacity   In comparison with the PFT on 7/10/19 there is significant   decrease in FEV1 from 1.91L to 1.53L; significant decrease in TLC   from 91% to 70%; and significant decrease in DLCO from 71% to 54%     SIX-MINUTE WALK:     A six-minute walk was performed on room air.     The patient walked a total of 800 feet.  She did stop or pause   during the walk for to place O2 at 2 lpm.  Baseline oxygen   saturation 97%.  The lowest oxygen saturation during the walk was   87% on minute 2    Assessment:      Diagnosis Orders   1. ILD (interstitial lung disease) ([facility] [address]  Full PFT Study With Bronchodilator    Carbon Monoxide Diffusing Capacity    6 Minute Walk Test       Plan:   54year old female with antisynthetase syndrome with interstitial lung disease.   CT chest findings are consistent with UIP  PFT reviewed, there is significant decline in TLC from 91 to 70%

## 2021-05-06 ENCOUNTER — TELEPHONE (OUTPATIENT)
Dept: RHEUMATOLOGY | Age: 56
End: 2021-05-06

## 2021-05-06 ENCOUNTER — HOSPITAL ENCOUNTER (OUTPATIENT)
Dept: PHYSICAL THERAPY | Age: 56
Setting detail: THERAPIES SERIES
Discharge: HOME | End: 2021-05-06
Payer: COMMERCIAL

## 2021-05-06 DIAGNOSIS — J96.91 RESPIRATORY FAILURE WITH HYPOXIA, UNSPECIFIED CHRONICITY (HCC): ICD-10-CM

## 2021-05-06 DIAGNOSIS — D89.89 ANTISYNTHETASE SYNDROME (HCC): ICD-10-CM

## 2021-05-06 PROCEDURE — 97140 MANUAL THERAPY 1/> REGIONS: CPT | Performed by: PHYSICAL THERAPIST

## 2021-05-06 PROCEDURE — 97110 THERAPEUTIC EXERCISES: CPT | Performed by: PHYSICAL THERAPIST

## 2021-05-06 PROCEDURE — 97112 NEUROMUSCULAR REEDUCATION: CPT | Performed by: PHYSICAL THERAPIST

## 2021-05-06 RX ORDER — SODIUM CHLORIDE 9 MG/ML
INJECTION, SOLUTION INTRAVENOUS CONTINUOUS
Status: CANCELLED | OUTPATIENT
Start: 2021-05-13

## 2021-05-06 RX ORDER — ACETAMINOPHEN 325 MG/1
650 TABLET ORAL ONCE
Status: CANCELLED | OUTPATIENT
Start: 2021-05-13 | End: 2021-05-13

## 2021-05-06 RX ORDER — DIPHENHYDRAMINE HYDROCHLORIDE 50 MG/ML
50 INJECTION INTRAMUSCULAR; INTRAVENOUS ONCE
Status: CANCELLED | OUTPATIENT
Start: 2021-05-13 | End: 2021-05-13

## 2021-05-06 RX ORDER — EPINEPHRINE 1 MG/ML
0.3 INJECTION, SOLUTION, CONCENTRATE INTRAVENOUS PRN
Status: CANCELLED | OUTPATIENT
Start: 2021-05-13

## 2021-05-06 RX ORDER — SODIUM CHLORIDE 0.9 % (FLUSH) 0.9 %
5-40 SYRINGE (ML) INJECTION PRN
Status: CANCELLED | OUTPATIENT
Start: 2021-05-13

## 2021-05-06 RX ORDER — DIPHENHYDRAMINE HCL 25 MG
25 TABLET ORAL ONCE
Status: CANCELLED | OUTPATIENT
Start: 2021-05-13 | End: 2021-05-13

## 2021-05-06 RX ORDER — HEPARIN SODIUM (PORCINE) LOCK FLUSH IV SOLN 100 UNIT/ML 100 UNIT/ML
500 SOLUTION INTRAVENOUS PRN
Status: CANCELLED | OUTPATIENT
Start: 2021-05-13

## 2021-05-06 RX ORDER — METHYLPREDNISOLONE SODIUM SUCCINATE 125 MG/2ML
125 INJECTION, POWDER, LYOPHILIZED, FOR SOLUTION INTRAMUSCULAR; INTRAVENOUS ONCE
Status: CANCELLED | OUTPATIENT
Start: 2021-05-13 | End: 2021-05-13

## 2021-05-06 RX ORDER — SODIUM CHLORIDE 9 MG/ML
25 INJECTION, SOLUTION INTRAVENOUS PRN
Status: CANCELLED | OUTPATIENT
Start: 2021-05-13

## 2021-05-06 ASSESSMENT — ENCOUNTER SYMPTOMS
WHEEZING: 0
SHORTNESS OF BREATH: 1
CHEST TIGHTNESS: 0
COUGH: 0

## 2021-05-06 NOTE — FLOWSHEET NOTE
The [address] [facility],[address], [address], [address] [place], [place]       Physical Therapy Daily Treatment Note  Date:  2021    Patient Name:  Elinor Phillip    :  1965  MRN: [id number]  Restrictions/Precautions:    Physician Information:  Referring Practitioner: Mohit  Medical/Treatment Diagnosis Information:  · Diagnosis: M25.512, G89.29 (ICD-10-CM) - Chronic pain in left shoulder  Treatment Diagnosis: Left shoulder pain M25.512, Cervical pain M54.2  [] Conservative / [] Surgical - DOS:   Insurance/Certification information:     Plan of care signed: [] YES  [] NO  Number of Comorbidities:  []0     [x]1-2    []3+      Is this a Progress Report:     []  Yes  [x]  No      If Yes:  Date Range for reporting period:  Beginning 2021  Ending     Progress report will be due (10 Rx or 30 days whichever is less):        Recertification will be due (POC Duration  / 90 days whichever is less): 2021     Visit # Insurance Allowable Requires auth   2 ? []no        []yes:        Latex Allergy:  [x]NO      []YES  Preferred Language for Healthcare:   [x]English       []other:      SUBJECTIVE:  Pt reports she probably hasn't done the exercises as much as she should. She hasn't had sharp shooting pain in her neck. It still is a little uncomfortable to lie down. There are some reaching movements that bother her shoulder.     OBJECTIVE:    Initial Initial Current   VAS 4-5       UEFI 75/80       ROM Left Right     Shoulder Flex 141       Shoulder Abd 141       Shoulder ER Behind the head       Shoulder IR L p       Strength  Left Right     Shoulder Flex 4+       Shoulder Scap 4       Shoulder ER 4       Shoulder IR 4       THORACIC ROM         flexion         ext     R/L   Rotation R/L WNL WNL R/L   SB R/L            CERV ROM Left Right     Cervical Flexion WNL       Cervical Extension WNL       Cervical SB R/L ~35 R/L   R/L   Cervical rotation posture, motor skill, proprioception of scapular, scapulothoracic and UE control with self care, reaching, carrying, lifting, house/yardwork, driving/computer work      Manual Treatments:  PROM / STM / Oscillations-Mobs:  G-I, II, III, IV (PA's, Inf., Post.)  [x] (26130) Provided manual therapy to mobilize soft tissue/joints of cervical/CT, scapular GHJ and UE for the purpose of modulating pain, promoting relaxation,  increasing ROM, reducing/eliminating soft tissue swelling/inflammation/restriction, improving soft tissue extensibility and allowing for proper ROM for normal function with self care, reaching, carrying, lifting, house/yardwork, driving/computer work    Modalities:    [] GR/ESU 15 min    [] GR 15 min  [] ESU     [] CP    [] MHP    [] declined    Charges:  Timed Code Treatment Minutes: 38   Total Treatment Minutes: 45     [] EVAL (LOW) 07334 (typically 20 minutes face-to-face)  [] EVAL (MOD) 31542 (typically 30 minutes face-to-face)  [] EVAL (HIGH) 80560 (typically 45 minutes face-to-face)  [] RE-EVAL     [x] XH(66693) x   1  [] IONTO  [x] NMR (92416) x  1   [] VASO  [x] Manual (61998) x   1   [] Other:  [] TA x      [] Mech Traction (60003)  [] ES(attended) (45753)      [] ES (un) (97700):     GOALS:  Long Term Goals: To be achieved in: 6 weeks  1. Disability index score of 75/80or less for the UEFI  to assist with reaching prior level of function. [] Progressing: [] Met: [x] Not Met: [] Adjusted  2. Patient will demonstrate increased AROM to WNL to allow for proper joint functioning as indicated by patients Functional Deficits. [] Progressing: [] Met: [x] Not Met: [] Adjusted  3. Patient will demonstrate an increase in Strength >/=4+/5 to allow for proper functional mobility as indicated by patients Functional Deficits. [] Progressing: [] Met: [x] Not Met: [] Adjusted  4. Patient will return to  driving functional activities without increased symptoms or restriction.    [] Progressing: [] Met: [x] Not Met: [] Adjusted  5. Pt will demonstrate appropriate postural awareness with ergonomic set up at home. [] Progressing: [] Met: [x] Not Met: [] Adjusted     Overall Progression Towards Functional goals/ Treatment Progress Update:  [] Patient is progressing as expected towards functional goals listed. [] Progression is slowed due to complexities/Impairments listed. [] Progression has been slowed due to co-morbidities. [x] Plan just implemented, too soon to assess goals progression <30days   [] Goals require adjustment due to lack of progress  [] Patient is not progressing as expected and requires additional follow up with physician  [] Other    Prognosis for POC: [x] Good [] Fair  [] Poor      Patient requires continued skilled intervention: [x] Yes  [] No      ASSESSMENT:  Decreased cervical muscle tension post-manuals. Tolerated cervical TE well. Treatment/Activity Tolerance:  [x] Patient tolerated treatment well [] Patient limited by fatique  [] Patient limited by pain  [] Patient limited by other medical complications  [] Other:     Prognosis: [] Good [] Fair  [] Poor    Patient Requires Follow-up: [x] Yes  [] No    PLAN: See eval  [] Continue per plan of care [] Alter current plan (see comments above)  [x] Plan of care initiated [] Hold pending MD visit [] Discharge      Electronically signed by:  Manuel Dejesus, PT , DPT, CDNT    Note: If patient does not return for scheduled/ recommended follow up visits, this note will serve as a discharge from care along with most recent update on progress.

## 2021-05-06 NOTE — TELEPHONE ENCOUNTER
Dr Mireya Corona from Pulmonology called requesting to speak with you regarding patient. I asked if it was urgent because you were with new patient, and he stated you could call at your earliest convenience.  [phone]

## 2021-05-07 ENCOUNTER — HOSPITAL ENCOUNTER (OUTPATIENT)
Dept: ONCOLOGY | Age: 56
Setting detail: INFUSION SERIES
Discharge: HOME | End: 2021-05-07
Payer: COMMERCIAL

## 2021-05-07 VITALS
HEIGHT: 61 IN | DIASTOLIC BLOOD PRESSURE: 83 MMHG | RESPIRATION RATE: 20 BRPM | WEIGHT: 136.02 LBS | TEMPERATURE: 98.4 F | HEART RATE: 82 BPM | OXYGEN SATURATION: 96 % | BODY MASS INDEX: 25.68 KG/M2 | SYSTOLIC BLOOD PRESSURE: 131 MMHG

## 2021-05-07 DIAGNOSIS — M19.90 INFLAMMATORY ARTHRITIS: Primary | ICD-10-CM

## 2021-05-07 PROCEDURE — 96415 CHEMO IV INFUSION ADDL HR: CPT

## 2021-05-07 PROCEDURE — 96374 THER/PROPH/DIAG INJ IV PUSH: CPT

## 2021-05-07 PROCEDURE — 96375 TX/PRO/DX INJ NEW DRUG ADDON: CPT

## 2021-05-07 PROCEDURE — 96413 CHEMO IV INFUSION 1 HR: CPT

## 2021-05-07 PROCEDURE — 2580000003 HC RX 258: Performed by: INTERNAL MEDICINE

## 2021-05-07 PROCEDURE — 96366 THER/PROPH/DIAG IV INF ADDON: CPT

## 2021-05-07 PROCEDURE — 6360000002 HC RX W HCPCS: Performed by: INTERNAL MEDICINE

## 2021-05-07 PROCEDURE — 6370000000 HC RX 637 (ALT 250 FOR IP): Performed by: INTERNAL MEDICINE

## 2021-05-07 PROCEDURE — 96365 THER/PROPH/DIAG IV INF INIT: CPT

## 2021-05-07 RX ORDER — METHYLPREDNISOLONE SODIUM SUCCINATE 125 MG/2ML
100 INJECTION, POWDER, LYOPHILIZED, FOR SOLUTION INTRAMUSCULAR; INTRAVENOUS ONCE
Status: CANCELLED | OUTPATIENT
Start: 2021-05-21 | End: 2021-05-21

## 2021-05-07 RX ORDER — SODIUM CHLORIDE 9 MG/ML
INJECTION, SOLUTION INTRAVENOUS CONTINUOUS
Status: CANCELLED | OUTPATIENT
Start: 2021-05-21

## 2021-05-07 RX ORDER — SODIUM CHLORIDE 9 MG/ML
INJECTION, SOLUTION INTRAVENOUS CONTINUOUS
Status: ACTIVE | OUTPATIENT
Start: 2021-05-07 | End: 2021-05-07

## 2021-05-07 RX ORDER — ACETAMINOPHEN 325 MG/1
650 TABLET ORAL ONCE
Status: CANCELLED | OUTPATIENT
Start: 2021-05-21 | End: 2021-05-21

## 2021-05-07 RX ORDER — HEPARIN SODIUM (PORCINE) LOCK FLUSH IV SOLN 100 UNIT/ML 100 UNIT/ML
500 SOLUTION INTRAVENOUS PRN
Status: CANCELLED | OUTPATIENT
Start: 2021-05-21

## 2021-05-07 RX ORDER — DIPHENHYDRAMINE HYDROCHLORIDE 50 MG/ML
25 INJECTION INTRAMUSCULAR; INTRAVENOUS ONCE
Status: CANCELLED | OUTPATIENT
Start: 2021-05-21 | End: 2021-05-21

## 2021-05-07 RX ORDER — METHYLPREDNISOLONE SODIUM SUCCINATE 125 MG/2ML
100 INJECTION, POWDER, LYOPHILIZED, FOR SOLUTION INTRAMUSCULAR; INTRAVENOUS ONCE
Status: COMPLETED | OUTPATIENT
Start: 2021-05-07 | End: 2021-05-07

## 2021-05-07 RX ORDER — DIPHENHYDRAMINE HYDROCHLORIDE 50 MG/ML
50 INJECTION INTRAMUSCULAR; INTRAVENOUS ONCE
Status: CANCELLED | OUTPATIENT
Start: 2021-05-21 | End: 2021-05-21

## 2021-05-07 RX ORDER — SODIUM CHLORIDE 0.9 % (FLUSH) 0.9 %
5-40 SYRINGE (ML) INJECTION PRN
Status: CANCELLED | OUTPATIENT
Start: 2021-05-21

## 2021-05-07 RX ORDER — SODIUM CHLORIDE 9 MG/ML
25 INJECTION, SOLUTION INTRAVENOUS PRN
Status: CANCELLED | OUTPATIENT
Start: 2021-05-21

## 2021-05-07 RX ORDER — METHYLPREDNISOLONE SODIUM SUCCINATE 125 MG/2ML
125 INJECTION, POWDER, LYOPHILIZED, FOR SOLUTION INTRAMUSCULAR; INTRAVENOUS ONCE
Status: CANCELLED | OUTPATIENT
Start: 2021-05-21 | End: 2021-05-21

## 2021-05-07 RX ORDER — ACETAMINOPHEN 325 MG/1
650 TABLET ORAL ONCE
Status: COMPLETED | OUTPATIENT
Start: 2021-05-07 | End: 2021-05-07

## 2021-05-07 RX ORDER — MEPERIDINE HYDROCHLORIDE 25 MG/ML
12.5 INJECTION INTRAMUSCULAR; INTRAVENOUS; SUBCUTANEOUS ONCE
Status: CANCELLED | OUTPATIENT
Start: 2021-05-21 | End: 2021-05-21

## 2021-05-07 RX ORDER — DIPHENHYDRAMINE HYDROCHLORIDE 50 MG/ML
25 INJECTION INTRAMUSCULAR; INTRAVENOUS ONCE
Status: COMPLETED | OUTPATIENT
Start: 2021-05-07 | End: 2021-05-07

## 2021-05-07 RX ADMIN — SODIUM CHLORIDE 1 ML: 9 INJECTION, SOLUTION INTRAVENOUS at 11:35

## 2021-05-07 RX ADMIN — DIPHENHYDRAMINE HYDROCHLORIDE 25 MG: 50 INJECTION, SOLUTION INTRAMUSCULAR; INTRAVENOUS at 10:48

## 2021-05-07 RX ADMIN — SODIUM CHLORIDE 1000 MG: 9 INJECTION, SOLUTION INTRAVENOUS at 11:42

## 2021-05-07 RX ADMIN — METHYLPREDNISOLONE SODIUM SUCCINATE 100 MG: 125 INJECTION, POWDER, FOR SOLUTION INTRAMUSCULAR; INTRAVENOUS at 10:49

## 2021-05-07 RX ADMIN — ACETAMINOPHEN 650 MG: 325 TABLET ORAL at 10:49

## 2021-05-07 ASSESSMENT — PAIN SCALES - GENERAL
PAINLEVEL_OUTOF10: 0
PAINLEVEL_OUTOF10: 0

## 2021-05-07 NOTE — PLAN OF CARE
Problem: SAFETY  Goal: Free from accidental physical injury  Outcome: Ongoing  Note: Pt is a fall risk. Explained fall risk precautions to pt & her Mother and rationale behind their use to keep the patient safe. Belongings are in reach. Pt encouraged to notify staff for any and all assistance. Staff present in tx suite throughout entirety of pts treatment to monitor and protect from falls. Assistance provided when ambulating to restroom utilizing Stay With Me. Problem: KNOWLEDGE DEFICIT  Goal: Patient/S.O. demonstrates understanding of disease process, treatment plan, medications, and discharge instructions. Outcome: Ongoing  Note: Pt seen and assessed at [address] [place] [address] for 1000 mg Rituximab infusion per orders from Dr. Tamiko Reese. Infused per [facility] policy. Monitoring completed for infusion reactions - see flowsheet. Pt tolerated infusion well and without incident. Pt verbalizes understanding of discharge instructions. Discharged ambulatory to home with her Mother.

## 2021-05-11 ENCOUNTER — TELEPHONE (OUTPATIENT)
Dept: DERMATOLOGY | Age: 56
End: 2021-05-11

## 2021-05-11 NOTE — TELEPHONE ENCOUNTER
Per Dr. Farhan Constantino she would like to see patient in a virtual visit to discuss otezla treatment, and process of getting it approved by her insurance    Left message on voicemail for patient to call the office back.

## 2021-05-15 DIAGNOSIS — E03.9 ACQUIRED HYPOTHYROIDISM: ICD-10-CM

## 2021-05-17 RX ORDER — LEVOTHYROXINE SODIUM 0.1 MG/1
TABLET ORAL
Qty: 90 TABLET | Refills: 3 | Status: SHIPPED | OUTPATIENT
Start: 2021-05-17 | End: 2022-03-22

## 2021-05-20 ENCOUNTER — HOSPITAL ENCOUNTER (OUTPATIENT)
Dept: PHYSICAL THERAPY | Age: 56
Setting detail: THERAPIES SERIES
Discharge: HOME | End: 2021-05-20
Payer: COMMERCIAL

## 2021-05-20 PROCEDURE — 97112 NEUROMUSCULAR REEDUCATION: CPT | Performed by: PHYSICAL THERAPIST

## 2021-05-20 PROCEDURE — 97140 MANUAL THERAPY 1/> REGIONS: CPT | Performed by: PHYSICAL THERAPIST

## 2021-05-20 PROCEDURE — 97110 THERAPEUTIC EXERCISES: CPT | Performed by: PHYSICAL THERAPIST

## 2021-05-20 NOTE — FLOWSHEET NOTE
The [address] [facility],[address], [address] [facility], [address] [place], [place]        To:   Dr Hitesh Barr     Patient: Panda Guerra   : 1965  [facility]:[id number]  Evaluation Date: 2021      Diagnosis Information:  ·   Diagnosis: M25.512, G89.29 (ICD-10-CM) - Chronic pain in left shoulder  · Treatment Diagnosis: Left shoulder pain M25.512, Cervical pain M54.2  ·       Physical Therapy Certification/Re-Certification Form  Dear Dr. Hitesh Barr,  The following patient has been evaluated for physical therapy services and for therapy to continue, Medicare requires monthly physician review of the treatment plan. Please review the attached evaluation and/or summary of the patient's plan of care, and verify that you agree therapy should continue by signing the attached document and sending it back to our office. If you have any questions or concerns, please don't hesitate to call. Thank you for your referral.      Physician Signature:________________________________Date:__________________  By signing above, therapists plan is approved by physician    Plan of Care/Treatment to date:  [x] Therapeutic Exercise    [] Modalities:  [] Therapeutic Activity     [] Ultrasound  [] Electric Stimulation  [] Gait Training      [] Cervical Traction [] Lumbar Traction  [x] Neuromuscular Re-education    [] Cold/hotpack [] Iontophoresis   [x] Instruction in HEP     Other:  [x] Manual Therapy      []             [] Aquatic Therapy      []           ? Frequency/Duration:  # Days per week: [x] 1 day # Weeks: [] 1 week [] 7 weeks     [] 2 days?    [] 2 weeks [] 8 weeks     [] 3 days   [] 3 weeks [] 9 weeks     [] 4 days   [] 4 weeks [] 10 weeks      [] 5 days   [] 5 weeks [] 11 weeks          [x] 6 weeks [] 12 weeks      Rehab Potential: [] Excellent [] Good [] Fair  [] Poor       Electronically signed by:  Gregory Foster, PT, DPT              Physical Therapy Daily Treatment Note   Date: Notes   No money with cerv ret x20    SBS x20    cerv ret seated x10    Chin tuck supine :3x10    Doorway stretch :10 x4    Seated cervical SNAG with pillowcase 2x5 to L, x5 R    TB ext 2x10 YTB   Pt ed posture, reading posture/seated posture for travel 4 min                                                           Manual Intervention     GH mobs/PROM     STW L paraspinals/UT/lev 8 min    Manual traction :10x6 relieving   scap mobs 4 min    Upper thoracic mobs G2 4 min              NMR re-education                                                 Therapeutic Exercise and NMR EXR  [x] (21889) Provided verbal/tactile cueing for activities related to strengthening, flexibility, endurance, ROM  for improvements in scapular, scapulothoracic and UE control with self care, reaching, carrying, lifting, house/yardwork, driving/computer work.    [] (36694) Provided verbal/tactile cueing for activities related to improving balance, coordination, kinesthetic sense, posture, motor skill, proprioception  to assist with  scapular, scapulothoracic and UE control with self care, reaching, carrying, lifting, house/yardwork, driving/computer work. Therapeutic Activities:    [] (83739 or 99980) Provided verbal/tactile cueing for activities related to improving balance, coordination, kinesthetic sense, posture, motor skill, proprioception and motor activation to allow for proper function of scapular, scapulothoracic and UE control with self care, carrying, lifting, driving/computer work.      Home Exercise Program:    [x] (20031) Reviewed/Progressed HEP activities related to strengthening, flexibility, endurance, ROM of scapular, scapulothoracic and UE control with self care, reaching, carrying, lifting, house/yardwork, driving/computer work  [] (43376) Reviewed/Progressed HEP activities related to improving balance, coordination, kinesthetic sense, posture, motor skill, proprioception of scapular, scapulothoracic and UE control with self care, reaching, carrying, lifting, house/yardwork, driving/computer work      Manual Treatments:  PROM / STM / Oscillations-Mobs:  G-I, II, III, IV (Thor, Inf., Post.)  [x] (79150) Provided manual therapy to mobilize soft tissue/joints of cervical/CT, scapular GHJ and UE for the purpose of modulating pain, promoting relaxation,  increasing ROM, reducing/eliminating soft tissue swelling/inflammation/restriction, improving soft tissue extensibility and allowing for proper ROM for normal function with self care, reaching, carrying, lifting, house/yardwork, driving/computer work    Modalities:    [] GR/ESU 15 min    [] GR 15 min  [] ESU     [] CP    [] MHP    [] declined    Charges:  Timed Code Treatment Minutes: 45   Total Treatment Minutes: 45     [] EVAL (LOW) 62926 (typically 20 minutes face-to-face)  [] EVAL (MOD) 16247 (typically 30 minutes face-to-face)  [] EVAL (HIGH) 01735 (typically 45 minutes face-to-face)  [] RE-EVAL     [x] UQ(25751) x   1  [] IONTO  [x] NMR (34036) x  1   [] VASO  [x] Manual (95180) x   1   [] Other:  [] TA x      [] Mech Traction (29339)  [] ES(attended) (74392)      [] ES (un) (91362):     GOALS:  Long Term Goals: To be achieved in: 6 weeks  1. Disability index score of 75/80or less for the UEFI  to assist with reaching prior level of function. [] Progressing: [] Met: [x] Not Met: [] Adjusted  2. Patient will demonstrate increased AROM to WNL to allow for proper joint functioning as indicated by patients Functional Deficits. [] Progressing: [] Met: [x] Not Met: [] Adjusted  3. Patient will demonstrate an increase in Strength >/=4+/5 to allow for proper functional mobility as indicated by patients Functional Deficits. [] Progressing: [] Met: [x] Not Met: [] Adjusted  4. Patient will return to  driving functional activities without increased symptoms or restriction. [] Progressing: [] Met: [x] Not Met: [] Adjusted  5.  Pt will demonstrate appropriate postural awareness with ergonomic set up at home. [] Progressing: [] Met: [x] Not Met: [] Adjusted     Overall Progression Towards Functional goals/ Treatment Progress Update:  [] Patient is progressing as expected towards functional goals listed. [] Progression is slowed due to complexities/Impairments listed. [] Progression has been slowed due to co-morbidities. [x] Plan just implemented, too soon to assess goals progression <30days   [] Goals require adjustment due to lack of progress  [] Patient is not progressing as expected and requires additional follow up with physician  [] Other    Prognosis for POC: [x] Good [] Fair  [] Poor      Patient requires continued skilled intervention: [x] Yes  [] No      ASSESSMENT:  Noted facet irritation with L rotation resulting in decreased ROM. Max cues for chin tuck technique, cervical snag, and scap position during TB ext. Treatment/Activity Tolerance:  [x] Patient tolerated treatment well [] Patient limited by fatique  [] Patient limited by pain  [] Patient limited by other medical complications  [] Other:     Prognosis: [] Good [] Fair  [] Poor    Patient Requires Follow-up: [x] Yes  [] No    PLAN: See eval  [x] Continue per plan of care [] Alter current plan (see comments above)  [] Plan of care initiated [] Hold pending MD visit [] Discharge      Electronically signed by:  Sloane Burgess, PT , DPT, CDNT      Angus Hoff [facility]    Therapist was present, directed the patient's care, made skilled judgement, and was responsible for assessment and treatment of the patient. Note: If patient does not return for scheduled/ recommended follow up visits, this note will serve as a discharge from care along with most recent update on progress.

## 2021-05-26 ENCOUNTER — HOSPITAL ENCOUNTER (OUTPATIENT)
Dept: ONCOLOGY | Age: 56
Setting detail: INFUSION SERIES
Discharge: HOME | End: 2021-05-26
Payer: COMMERCIAL

## 2021-05-26 VITALS
RESPIRATION RATE: 4 BRPM | SYSTOLIC BLOOD PRESSURE: 118 MMHG | OXYGEN SATURATION: 100 % | DIASTOLIC BLOOD PRESSURE: 65 MMHG | TEMPERATURE: 98.4 F | HEART RATE: 71 BPM | WEIGHT: 136.02 LBS | BODY MASS INDEX: 26.1 KG/M2

## 2021-05-26 DIAGNOSIS — D89.89 ANTISYNTHETASE SYNDROME (HCC): Primary | ICD-10-CM

## 2021-05-26 DIAGNOSIS — J96.91 RESPIRATORY FAILURE WITH HYPOXIA, UNSPECIFIED CHRONICITY (HCC): ICD-10-CM

## 2021-05-26 PROCEDURE — 96365 THER/PROPH/DIAG IV INF INIT: CPT

## 2021-05-26 PROCEDURE — 96366 THER/PROPH/DIAG IV INF ADDON: CPT

## 2021-05-26 PROCEDURE — 6370000000 HC RX 637 (ALT 250 FOR IP): Performed by: INTERNAL MEDICINE

## 2021-05-26 PROCEDURE — 6360000002 HC RX W HCPCS: Performed by: INTERNAL MEDICINE

## 2021-05-26 RX ORDER — SODIUM CHLORIDE 0.9 % (FLUSH) 0.9 %
5-40 SYRINGE (ML) INJECTION PRN
Status: CANCELLED | OUTPATIENT
Start: 2021-05-27

## 2021-05-26 RX ORDER — SODIUM CHLORIDE 9 MG/ML
INJECTION, SOLUTION INTRAVENOUS CONTINUOUS
Status: CANCELLED | OUTPATIENT
Start: 2021-05-27

## 2021-05-26 RX ORDER — METHYLPREDNISOLONE SODIUM SUCCINATE 125 MG/2ML
125 INJECTION, POWDER, LYOPHILIZED, FOR SOLUTION INTRAMUSCULAR; INTRAVENOUS ONCE
Status: CANCELLED | OUTPATIENT
Start: 2021-06-02 | End: 2021-06-02

## 2021-05-26 RX ORDER — METHYLPREDNISOLONE SODIUM SUCCINATE 125 MG/2ML
125 INJECTION, POWDER, LYOPHILIZED, FOR SOLUTION INTRAMUSCULAR; INTRAVENOUS ONCE
Status: CANCELLED | OUTPATIENT
Start: 2021-05-27 | End: 2021-05-27

## 2021-05-26 RX ORDER — SODIUM CHLORIDE 0.9 % (FLUSH) 0.9 %
5-40 SYRINGE (ML) INJECTION PRN
Status: CANCELLED | OUTPATIENT
Start: 2021-06-02

## 2021-05-26 RX ORDER — SODIUM CHLORIDE 9 MG/ML
25 INJECTION, SOLUTION INTRAVENOUS PRN
Status: CANCELLED | OUTPATIENT
Start: 2021-05-27

## 2021-05-26 RX ORDER — HEPARIN SODIUM (PORCINE) LOCK FLUSH IV SOLN 100 UNIT/ML 100 UNIT/ML
500 SOLUTION INTRAVENOUS PRN
Status: CANCELLED | OUTPATIENT
Start: 2021-05-27

## 2021-05-26 RX ORDER — DIPHENHYDRAMINE HYDROCHLORIDE 50 MG/ML
50 INJECTION INTRAMUSCULAR; INTRAVENOUS ONCE
Status: CANCELLED | OUTPATIENT
Start: 2021-05-27 | End: 2021-05-27

## 2021-05-26 RX ORDER — SODIUM CHLORIDE 9 MG/ML
25 INJECTION, SOLUTION INTRAVENOUS PRN
Status: CANCELLED | OUTPATIENT
Start: 2021-06-02

## 2021-05-26 RX ORDER — ACETAMINOPHEN 325 MG/1
650 TABLET ORAL ONCE
Status: CANCELLED | OUTPATIENT
Start: 2021-05-27 | End: 2021-05-27

## 2021-05-26 RX ORDER — ACETAMINOPHEN 325 MG/1
650 TABLET ORAL ONCE
Status: COMPLETED | OUTPATIENT
Start: 2021-05-26 | End: 2021-05-26

## 2021-05-26 RX ORDER — HEPARIN SODIUM (PORCINE) LOCK FLUSH IV SOLN 100 UNIT/ML 100 UNIT/ML
500 SOLUTION INTRAVENOUS PRN
Status: CANCELLED | OUTPATIENT
Start: 2021-06-02

## 2021-05-26 RX ORDER — DIPHENHYDRAMINE HCL 25 MG
25 TABLET ORAL ONCE
Status: CANCELLED | OUTPATIENT
Start: 2021-05-27 | End: 2021-05-27

## 2021-05-26 RX ORDER — DIPHENHYDRAMINE HYDROCHLORIDE 50 MG/ML
25 INJECTION INTRAMUSCULAR; INTRAVENOUS ONCE
Status: CANCELLED | OUTPATIENT
Start: 2021-06-02 | End: 2021-06-02

## 2021-05-26 RX ORDER — SODIUM CHLORIDE 9 MG/ML
INJECTION, SOLUTION INTRAVENOUS CONTINUOUS
Status: CANCELLED | OUTPATIENT
Start: 2021-06-02

## 2021-05-26 RX ORDER — DIPHENHYDRAMINE HCL 25 MG
25 TABLET ORAL ONCE
Status: COMPLETED | OUTPATIENT
Start: 2021-05-26 | End: 2021-05-26

## 2021-05-26 RX ORDER — DIPHENHYDRAMINE HYDROCHLORIDE 50 MG/ML
50 INJECTION INTRAMUSCULAR; INTRAVENOUS ONCE
Status: CANCELLED | OUTPATIENT
Start: 2021-06-02 | End: 2021-06-02

## 2021-05-26 RX ORDER — METHYLPREDNISOLONE SODIUM SUCCINATE 125 MG/2ML
100 INJECTION, POWDER, LYOPHILIZED, FOR SOLUTION INTRAMUSCULAR; INTRAVENOUS ONCE
Status: CANCELLED | OUTPATIENT
Start: 2021-06-02 | End: 2021-06-02

## 2021-05-26 RX ORDER — ACETAMINOPHEN 325 MG/1
650 TABLET ORAL ONCE
Status: CANCELLED | OUTPATIENT
Start: 2021-06-02 | End: 2021-06-02

## 2021-05-26 RX ORDER — MEPERIDINE HYDROCHLORIDE 25 MG/ML
12.5 INJECTION INTRAMUSCULAR; INTRAVENOUS; SUBCUTANEOUS ONCE
Status: CANCELLED | OUTPATIENT
Start: 2021-06-02 | End: 2021-06-02

## 2021-05-26 RX ADMIN — DIPHENHYDRAMINE HYDROCHLORIDE 25 MG: 25 TABLET ORAL at 11:19

## 2021-05-26 RX ADMIN — IMMUNE GLOBULIN (HUMAN) 40000 MG: 10 INJECTION INTRAVENOUS; SUBCUTANEOUS at 12:07

## 2021-05-26 RX ADMIN — ACETAMINOPHEN 650 MG: 325 TABLET ORAL at 11:19

## 2021-05-26 NOTE — PLAN OF CARE
Problem: KNOWLEDGE DEFICIT  Goal: Patient/S.O. demonstrates understanding of disease process, treatment plan, medications, and discharge instructions. Outcome: Met This Shift     Pt seen and assessed at [address] [facility] today for IVIG infusion per orders from Dr. Cammy Momin. Infused per [facility] policy. Monitoring completed for infusion reactions - see flowsheet. Pt tolerated infusion well and without incident. Pt verbalizes understanding of discharge instructions. Discharged ambulatory to home with mother.

## 2021-05-27 ENCOUNTER — HOSPITAL ENCOUNTER (OUTPATIENT)
Dept: ONCOLOGY | Age: 56
Setting detail: INFUSION SERIES
Discharge: HOME | End: 2021-05-27
Payer: COMMERCIAL

## 2021-05-27 VITALS
OXYGEN SATURATION: 98 % | HEART RATE: 74 BPM | TEMPERATURE: 98.1 F | SYSTOLIC BLOOD PRESSURE: 107 MMHG | DIASTOLIC BLOOD PRESSURE: 69 MMHG | RESPIRATION RATE: 20 BRPM

## 2021-05-27 DIAGNOSIS — J96.91 RESPIRATORY FAILURE WITH HYPOXIA, UNSPECIFIED CHRONICITY (HCC): ICD-10-CM

## 2021-05-27 DIAGNOSIS — D89.89 ANTISYNTHETASE SYNDROME (HCC): Primary | ICD-10-CM

## 2021-05-27 PROCEDURE — 96365 THER/PROPH/DIAG IV INF INIT: CPT

## 2021-05-27 PROCEDURE — 2580000003 HC RX 258: Performed by: INTERNAL MEDICINE

## 2021-05-27 PROCEDURE — 6360000002 HC RX W HCPCS: Performed by: INTERNAL MEDICINE

## 2021-05-27 PROCEDURE — 96366 THER/PROPH/DIAG IV INF ADDON: CPT

## 2021-05-27 PROCEDURE — 6370000000 HC RX 637 (ALT 250 FOR IP): Performed by: INTERNAL MEDICINE

## 2021-05-27 RX ORDER — SODIUM CHLORIDE 9 MG/ML
INJECTION, SOLUTION INTRAVENOUS CONTINUOUS
Status: CANCELLED | OUTPATIENT
Start: 2021-05-28

## 2021-05-27 RX ORDER — HEPARIN SODIUM (PORCINE) LOCK FLUSH IV SOLN 100 UNIT/ML 100 UNIT/ML
500 SOLUTION INTRAVENOUS PRN
Status: CANCELLED | OUTPATIENT
Start: 2021-05-28

## 2021-05-27 RX ORDER — DIPHENHYDRAMINE HCL 25 MG
25 TABLET ORAL ONCE
Status: COMPLETED | OUTPATIENT
Start: 2021-05-27 | End: 2021-05-27

## 2021-05-27 RX ORDER — ACETAMINOPHEN 325 MG/1
650 TABLET ORAL ONCE
Status: CANCELLED | OUTPATIENT
Start: 2021-05-28 | End: 2021-05-28

## 2021-05-27 RX ORDER — SODIUM CHLORIDE 9 MG/ML
25 INJECTION, SOLUTION INTRAVENOUS PRN
Status: CANCELLED | OUTPATIENT
Start: 2021-05-28

## 2021-05-27 RX ORDER — DIPHENHYDRAMINE HCL 25 MG
25 TABLET ORAL ONCE
Status: CANCELLED | OUTPATIENT
Start: 2021-05-28 | End: 2021-05-28

## 2021-05-27 RX ORDER — SODIUM CHLORIDE 9 MG/ML
INJECTION, SOLUTION INTRAVENOUS CONTINUOUS
Status: ACTIVE | OUTPATIENT
Start: 2021-05-27 | End: 2021-05-27

## 2021-05-27 RX ORDER — SODIUM CHLORIDE 0.9 % (FLUSH) 0.9 %
5-40 SYRINGE (ML) INJECTION PRN
Status: CANCELLED | OUTPATIENT
Start: 2021-05-28

## 2021-05-27 RX ORDER — ACETAMINOPHEN 325 MG/1
650 TABLET ORAL ONCE
Status: COMPLETED | OUTPATIENT
Start: 2021-05-27 | End: 2021-05-27

## 2021-05-27 RX ORDER — METHYLPREDNISOLONE SODIUM SUCCINATE 125 MG/2ML
125 INJECTION, POWDER, LYOPHILIZED, FOR SOLUTION INTRAMUSCULAR; INTRAVENOUS ONCE
Status: CANCELLED | OUTPATIENT
Start: 2021-05-28 | End: 2021-05-28

## 2021-05-27 RX ORDER — DIPHENHYDRAMINE HYDROCHLORIDE 50 MG/ML
50 INJECTION INTRAMUSCULAR; INTRAVENOUS ONCE
Status: CANCELLED | OUTPATIENT
Start: 2021-05-28 | End: 2021-05-28

## 2021-05-27 RX ADMIN — DIPHENHYDRAMINE HYDROCHLORIDE 25 MG: 25 TABLET ORAL at 11:11

## 2021-05-27 RX ADMIN — ACETAMINOPHEN 650 MG: 325 TABLET ORAL at 11:11

## 2021-05-27 RX ADMIN — SODIUM CHLORIDE: 9 INJECTION, SOLUTION INTRAVENOUS at 11:28

## 2021-05-27 RX ADMIN — IMMUNE GLOBULIN (HUMAN) 40000 MG: 10 INJECTION INTRAVENOUS; SUBCUTANEOUS at 12:25

## 2021-05-28 ENCOUNTER — HOSPITAL ENCOUNTER (OUTPATIENT)
Dept: ONCOLOGY | Age: 56
Setting detail: INFUSION SERIES
Discharge: HOME | End: 2021-05-28
Payer: COMMERCIAL

## 2021-05-28 VITALS
DIASTOLIC BLOOD PRESSURE: 77 MMHG | OXYGEN SATURATION: 98 % | HEART RATE: 76 BPM | RESPIRATION RATE: 18 BRPM | TEMPERATURE: 98.2 F | SYSTOLIC BLOOD PRESSURE: 120 MMHG

## 2021-05-28 DIAGNOSIS — J96.91 RESPIRATORY FAILURE WITH HYPOXIA, UNSPECIFIED CHRONICITY (HCC): ICD-10-CM

## 2021-05-28 DIAGNOSIS — D89.89 ANTISYNTHETASE SYNDROME (HCC): Primary | ICD-10-CM

## 2021-05-28 PROCEDURE — 6360000002 HC RX W HCPCS: Performed by: INTERNAL MEDICINE

## 2021-05-28 PROCEDURE — 96366 THER/PROPH/DIAG IV INF ADDON: CPT

## 2021-05-28 PROCEDURE — 6370000000 HC RX 637 (ALT 250 FOR IP): Performed by: INTERNAL MEDICINE

## 2021-05-28 PROCEDURE — 2580000003 HC RX 258: Performed by: INTERNAL MEDICINE

## 2021-05-28 PROCEDURE — 96365 THER/PROPH/DIAG IV INF INIT: CPT

## 2021-05-28 RX ORDER — ACETAMINOPHEN 325 MG/1
650 TABLET ORAL ONCE
Status: COMPLETED | OUTPATIENT
Start: 2021-05-28 | End: 2021-05-28

## 2021-05-28 RX ORDER — ACETAMINOPHEN 325 MG/1
650 TABLET ORAL ONCE
Status: CANCELLED | OUTPATIENT
Start: 2021-05-29 | End: 2021-05-29

## 2021-05-28 RX ORDER — DIPHENHYDRAMINE HCL 25 MG
25 TABLET ORAL ONCE
Status: COMPLETED | OUTPATIENT
Start: 2021-05-28 | End: 2021-05-28

## 2021-05-28 RX ORDER — DIPHENHYDRAMINE HYDROCHLORIDE 50 MG/ML
50 INJECTION INTRAMUSCULAR; INTRAVENOUS ONCE
Status: CANCELLED | OUTPATIENT
Start: 2021-05-29 | End: 2021-05-29

## 2021-05-28 RX ORDER — HEPARIN SODIUM (PORCINE) LOCK FLUSH IV SOLN 100 UNIT/ML 100 UNIT/ML
500 SOLUTION INTRAVENOUS PRN
Status: CANCELLED | OUTPATIENT
Start: 2021-05-29

## 2021-05-28 RX ORDER — SODIUM CHLORIDE 9 MG/ML
25 INJECTION, SOLUTION INTRAVENOUS PRN
Status: CANCELLED | OUTPATIENT
Start: 2021-05-29

## 2021-05-28 RX ORDER — SODIUM CHLORIDE 9 MG/ML
INJECTION, SOLUTION INTRAVENOUS CONTINUOUS
Status: CANCELLED | OUTPATIENT
Start: 2021-05-29

## 2021-05-28 RX ORDER — DIPHENHYDRAMINE HCL 25 MG
25 TABLET ORAL ONCE
Status: CANCELLED | OUTPATIENT
Start: 2021-05-29 | End: 2021-05-29

## 2021-05-28 RX ORDER — METHYLPREDNISOLONE SODIUM SUCCINATE 125 MG/2ML
125 INJECTION, POWDER, LYOPHILIZED, FOR SOLUTION INTRAMUSCULAR; INTRAVENOUS ONCE
Status: CANCELLED | OUTPATIENT
Start: 2021-05-29 | End: 2021-05-29

## 2021-05-28 RX ORDER — SODIUM CHLORIDE 9 MG/ML
INJECTION, SOLUTION INTRAVENOUS CONTINUOUS
Status: ACTIVE | OUTPATIENT
Start: 2021-05-28 | End: 2021-05-28

## 2021-05-28 RX ORDER — SODIUM CHLORIDE 0.9 % (FLUSH) 0.9 %
5-40 SYRINGE (ML) INJECTION PRN
Status: CANCELLED | OUTPATIENT
Start: 2021-05-29

## 2021-05-28 RX ADMIN — ACETAMINOPHEN 650 MG: 325 TABLET ORAL at 11:15

## 2021-05-28 RX ADMIN — IMMUNE GLOBULIN (HUMAN) 40000 MG: 10 INJECTION INTRAVENOUS; SUBCUTANEOUS at 11:49

## 2021-05-28 RX ADMIN — DIPHENHYDRAMINE HYDROCHLORIDE 25 MG: 25 TABLET ORAL at 11:16

## 2021-05-28 RX ADMIN — SODIUM CHLORIDE: 9 INJECTION, SOLUTION INTRAVENOUS at 11:20

## 2021-05-28 NOTE — PROGRESS NOTES
Pt seen and assessed at [address] [facility] for infusion 3/3 of IVIG infusion per orders from Dr. Wayne Hernández. Infused per [facility] policy. Monitoring completed for infusion reactions - see flowsheet. Tolerating infusion without incident at this time. Care handed off to Nyla Dubin, RN.       Niurka Leal RN

## 2021-06-04 ENCOUNTER — OFFICE VISIT (OUTPATIENT)
Dept: RHEUMATOLOGY | Age: 56
End: 2021-06-04
Payer: COMMERCIAL

## 2021-06-04 ENCOUNTER — TELEPHONE (OUTPATIENT)
Dept: RHEUMATOLOGY | Age: 56
End: 2021-06-04

## 2021-06-04 VITALS
BODY MASS INDEX: 26.7 KG/M2 | SYSTOLIC BLOOD PRESSURE: 120 MMHG | HEIGHT: 60 IN | DIASTOLIC BLOOD PRESSURE: 78 MMHG | WEIGHT: 136 LBS

## 2021-06-04 DIAGNOSIS — Z79.899 HIGH RISK MEDICATION USE: ICD-10-CM

## 2021-06-04 DIAGNOSIS — M19.90 INFLAMMATORY ARTHRITIS: ICD-10-CM

## 2021-06-04 DIAGNOSIS — D89.89 ANTISYNTHETASE SYNDROME (HCC): Primary | ICD-10-CM

## 2021-06-04 DIAGNOSIS — J84.9 ILD (INTERSTITIAL LUNG DISEASE) (HCC): ICD-10-CM

## 2021-06-04 LAB
A/G RATIO: 1 (ref 1.1–2.2)
ALBUMIN SERPL-MCNC: 3.9 G/DL (ref 3.4–5)
ALP BLD-CCNC: 56 U/L (ref 40–129)
ALT SERPL-CCNC: 20 U/L (ref 10–40)
ANION GAP SERPL CALCULATED.3IONS-SCNC: 9 MMOL/L (ref 3–16)
AST SERPL-CCNC: 21 U/L (ref 15–37)
BILIRUB SERPL-MCNC: 0.5 MG/DL (ref 0–1)
BUN BLDV-MCNC: 12 MG/DL (ref 7–20)
C-REACTIVE PROTEIN: 7.1 MG/L (ref 0–5.1)
CALCIUM SERPL-MCNC: 8.8 MG/DL (ref 8.3–10.6)
CHLORIDE BLD-SCNC: 98 MMOL/L (ref 99–110)
CO2: 26 MMOL/L (ref 21–32)
CREAT SERPL-MCNC: 0.6 MG/DL (ref 0.6–1.1)
GFR AFRICAN AMERICAN: >60
GFR NON-AFRICAN AMERICAN: >60
GLOBULIN: 3.9 G/DL
GLUCOSE BLD-MCNC: 79 MG/DL (ref 70–99)
POTASSIUM SERPL-SCNC: 4.2 MMOL/L (ref 3.5–5.1)
SEDIMENTATION RATE, ERYTHROCYTE: 63 MM/HR (ref 0–30)
SODIUM BLD-SCNC: 133 MMOL/L (ref 136–145)
TOTAL CK: 147 U/L (ref 26–192)
TOTAL PROTEIN: 7.8 G/DL (ref 6.4–8.2)

## 2021-06-04 PROCEDURE — 3017F COLORECTAL CA SCREEN DOC REV: CPT | Performed by: INTERNAL MEDICINE

## 2021-06-04 PROCEDURE — G8427 DOCREV CUR MEDS BY ELIG CLIN: HCPCS | Performed by: INTERNAL MEDICINE

## 2021-06-04 PROCEDURE — 99215 OFFICE O/P EST HI 40 MIN: CPT | Performed by: INTERNAL MEDICINE

## 2021-06-04 PROCEDURE — 1036F TOBACCO NON-USER: CPT | Performed by: INTERNAL MEDICINE

## 2021-06-04 PROCEDURE — G8419 CALC BMI OUT NRM PARAM NOF/U: HCPCS | Performed by: INTERNAL MEDICINE

## 2021-06-04 PROCEDURE — 85025 COMPLETE CBC W/AUTO DIFF WBC: CPT | Performed by: INTERNAL MEDICINE

## 2021-06-04 PROCEDURE — 36415 COLL VENOUS BLD VENIPUNCTURE: CPT | Performed by: INTERNAL MEDICINE

## 2021-06-04 RX ORDER — SULFAMETHOXAZOLE AND TRIMETHOPRIM 800; 160 MG/1; MG/1
TABLET ORAL
Qty: 36 TABLET | Refills: 3 | Status: SHIPPED | OUTPATIENT
Start: 2021-06-04 | End: 2022-07-27 | Stop reason: ALTCHOICE

## 2021-06-04 NOTE — PROGRESS NOTES
[address], [place]                                                                                                                         [phone] (P) [phone] (F)    Note is transcribed using voice recognition software. Inadvertent computerized transcription errors may be present. Patient identification: Indy Hays,: ,27 y.o. Sex: female     A/P  Linda was seen today for follow-up. 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 A/P from Today's visit-    1. 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 21- PFT-FVC 60% predicted, TLC 79% predicted, DLCO 54% predicted. Received 1 dose of Rituxan, due for second one, and had 1 full cycle of IVIG. Skin, muscle disease- resolved. No rashes, normal CK, aldolase. No MK symptoms. ILD- SOB unchanged. Plan-   Schedule second dose of Rituxan, will continue monthly  IVIg for now for at least 6 months. Continue  CellCept 3 g a day, 20 mg pred daily, Plaquenil 200 mg BID and Bactrim prophylaxis.  Follow up with pul regarding the need of o2. Baseline DEXA scan from 9/20/2019 lumbar T score -1.8, left femoral neck -1.7. PA reclast, as she is unable to take oral bisphosphonates due to GERD. Side effects, directions explained. She is aware of myositis emergencies. Patient indicates understanding and agrees with the management plan. I reviewed patient's history, referral documents and electronic medical records. #######################################################################    Subjective-  Follow-up for polymyositis. Interval changes-  States that skin is cleared, [address] feeling much better, but pul complaints remain the same. She is trying to exercise, limited due to SOB, wondering about the need of oxygen while working out. No fever, chills, infections, dysphagia, diplopia or any overt weakness in her muscles. Tolerating medications well. All other review of systems are negative. No family history of autoimmune diseases    Current Outpatient Medications   Medication Sig Dispense Refill    sulfamethoxazole-trimethoprim (BACTRIM DS;SEPTRA DS) 800-160 MG per tablet Take 3 times a week- M/W/F 36 tablet 3    levothyroxine (SYNTHROID) 100 MCG tablet TAKE 1 TABLET BY MOUTH  DAILY 90 tablet 3    hydroxychloroquine (PLAQUENIL) 200 MG tablet TAKE ONE TABLET BY MOUTH TWICE A  tablet 1    predniSONE (DELTASONE) 20 MG tablet Take 1 Daily. 90 tablet 0    mycophenolate (CELLCEPT) 500 MG tablet Take 3 tab po  tablet 3    sulfamethoxazole-trimethoprim (BACTRIM DS;SEPTRA DS) 800-160 MG per tablet Take 1 tab three times a week. M/W/F 36 tablet 1    Apremilast 10 & 20 & 30 MG TBPK As per starter pack directions. 1 each 0    Apremilast (OTEZLA) 30 MG TABS Take 30 mg by mouth 2 times daily Start after completing the starter pack.  60 tablet 3    levothyroxine (SYNTHROID) 100 MCG tablet TAKE 1 TABLET DAILY 90 tablet 0    mycophenolate (CELLCEPT) 500 MG tablet Take 3 tablets by mouth 2 times daily 180 tablet 2  predniSONE (DELTASONE) 5 MG tablet Take 2 tab po daily x 15 days, take1.5 mg po daily x 15 days then take 1 tab po daily. 120 tablet 1    Lactobacillus (PROBIOTIC ACIDOPHILUS) CAPS Take one capsule once daily 30 capsule 5    clobetasol (TEMOVATE) 0.05 % external solution Apply a small amount daily for 2 weeks or until improved. 100 mL 3    clobetasol (TEMOVATE) 0.05 % ointment Apply to affected area twice daily on body and hands 60 g 2    triamcinolone (KENALOG) 0.025 % cream Apply to affected area on face twice daily 80 g 2    ibuprofen (ADVIL;MOTRIN) 600 MG tablet Take 1 tablet by mouth every 6 hours as needed for Pain 60 tablet 1    ALBUTEROL SULFATE IN Inhale into the lungs as needed      zoledronic acid (RECLAST) 5 MG/100ML SOLN Infuse 100 mLs intravenously once for 1 dose 100 mL 0     No current facility-administered medications for this visit. Allergies   Allergen Reactions    Wasp Venom Other (See Comments)       PHYSICAL EXAM:    Vitals:    /78   Ht 5' (1.524 m)   Wt 136 lb (61.7 kg)   LMP 09/12/2013   BMI 26.56 kg/m²   General appearance/ Psychiatric: well nourished, and well groomed, normal judgement, alert, appears stated age and cooperative. MKS: Normal MK exam in upper, lower ext and rash. No skin findings of DM/PM at this time. Chest-No crackles suprisingly. equal air entry bilaterally. S1-S2 regular, no added sounds.     DATA:   Lab Results   Component Value Date    WBC 7.5 04/09/2021    HGB 12.6 04/09/2021    HCT 39.3 04/09/2021    MCV 91.2 04/09/2021     04/09/2021         Chemistry        Component Value Date/Time     (L) 06/04/2021 0821    K 4.2 06/04/2021 0821    CL 98 (L) 06/04/2021 0821    CO2 26 06/04/2021 0821    BUN 12 06/04/2021 0821    CREATININE 0.6 06/04/2021 0821        Component Value Date/Time    CALCIUM 8.8 06/04/2021 0821    ALKPHOS 56 06/04/2021 0821    AST 21 06/04/2021 0821    ALT 20 06/04/2021 0821    BILITOT 0.5 06/04/2021 0822 No results found for: [facility]  Lab Results   Component Value Date    SEDRATE 63 (H) 06/04/2021     Lab Results   Component Value Date    CRP 7.1 (H) 06/04/2021     Lab Results   Component Value Date    MICHELLE POSITIVE (A) 06/12/2019    SEDRATE 63 (H) 06/04/2021     Lab Results   Component Value Date    CKTOTAL 147 06/04/2021     Lab Results   Component Value Date    TSH 2.40 07/26/2018     Lab Results   Component Value Date    VITD25 22.5 (L) 08/19/2020         Radiology Review:    7/9/19-   IMPRESSION:  Mild restrictive ventilatory defect. Finding may be  consistent with neuromuscular chest wall, pleural, or parenchymal  disorder, including mild obesity.     A/P- See above. Total time 50  minutes that includes the following-  Preparing to see the patient such as reviewing patients records, pre-charting, preparing the visit on the same day, performing a medically appropriate history and physical examination, counseling and educating patient about diagnosis, management plan, ordering appropriate testings, prescriptions, communicating findings to other care providers, and documenting clinical information in electronic medical record.

## 2021-06-07 LAB — ALDOLASE: 7.2 U/L (ref 1.5–8.1)

## 2021-06-11 ENCOUNTER — HOSPITAL ENCOUNTER (OUTPATIENT)
Dept: ONCOLOGY | Age: 56
Setting detail: INFUSION SERIES
Discharge: HOME | End: 2021-06-11
Payer: COMMERCIAL

## 2021-06-11 VITALS
OXYGEN SATURATION: 98 % | RESPIRATION RATE: 16 BRPM | SYSTOLIC BLOOD PRESSURE: 122 MMHG | HEART RATE: 81 BPM | DIASTOLIC BLOOD PRESSURE: 76 MMHG | TEMPERATURE: 99 F

## 2021-06-11 DIAGNOSIS — M19.90 INFLAMMATORY ARTHRITIS: Primary | ICD-10-CM

## 2021-06-11 PROCEDURE — 96415 CHEMO IV INFUSION ADDL HR: CPT

## 2021-06-11 PROCEDURE — 96374 THER/PROPH/DIAG INJ IV PUSH: CPT

## 2021-06-11 PROCEDURE — 96367 TX/PROPH/DG ADDL SEQ IV INF: CPT

## 2021-06-11 PROCEDURE — 96365 THER/PROPH/DIAG IV INF INIT: CPT

## 2021-06-11 PROCEDURE — 6360000002 HC RX W HCPCS: Performed by: INTERNAL MEDICINE

## 2021-06-11 PROCEDURE — 2580000003 HC RX 258: Performed by: INTERNAL MEDICINE

## 2021-06-11 PROCEDURE — 6370000000 HC RX 637 (ALT 250 FOR IP): Performed by: INTERNAL MEDICINE

## 2021-06-11 PROCEDURE — 96413 CHEMO IV INFUSION 1 HR: CPT

## 2021-06-11 PROCEDURE — 96375 TX/PRO/DX INJ NEW DRUG ADDON: CPT

## 2021-06-11 RX ORDER — ACETAMINOPHEN 325 MG/1
650 TABLET ORAL ONCE
Status: COMPLETED | OUTPATIENT
Start: 2021-06-11 | End: 2021-06-11

## 2021-06-11 RX ORDER — SODIUM CHLORIDE 9 MG/ML
INJECTION, SOLUTION INTRAVENOUS CONTINUOUS
Status: CANCELLED | OUTPATIENT
Start: 2021-06-16

## 2021-06-11 RX ORDER — METHYLPREDNISOLONE SODIUM SUCCINATE 125 MG/2ML
100 INJECTION, POWDER, LYOPHILIZED, FOR SOLUTION INTRAMUSCULAR; INTRAVENOUS ONCE
Status: CANCELLED | OUTPATIENT
Start: 2021-06-16 | End: 2021-06-16

## 2021-06-11 RX ORDER — METHYLPREDNISOLONE SODIUM SUCCINATE 125 MG/2ML
125 INJECTION, POWDER, LYOPHILIZED, FOR SOLUTION INTRAMUSCULAR; INTRAVENOUS ONCE
Status: CANCELLED | OUTPATIENT
Start: 2021-06-16 | End: 2021-06-16

## 2021-06-11 RX ORDER — SODIUM CHLORIDE 9 MG/ML
INJECTION, SOLUTION INTRAVENOUS CONTINUOUS
Status: ACTIVE | OUTPATIENT
Start: 2021-06-11 | End: 2021-06-11

## 2021-06-11 RX ORDER — HEPARIN SODIUM (PORCINE) LOCK FLUSH IV SOLN 100 UNIT/ML 100 UNIT/ML
500 SOLUTION INTRAVENOUS PRN
Status: CANCELLED | OUTPATIENT
Start: 2021-06-16

## 2021-06-11 RX ORDER — SODIUM CHLORIDE 9 MG/ML
25 INJECTION, SOLUTION INTRAVENOUS PRN
Status: CANCELLED | OUTPATIENT
Start: 2021-06-16

## 2021-06-11 RX ORDER — DIPHENHYDRAMINE HYDROCHLORIDE 50 MG/ML
50 INJECTION INTRAMUSCULAR; INTRAVENOUS ONCE
Status: CANCELLED | OUTPATIENT
Start: 2021-06-16 | End: 2021-06-16

## 2021-06-11 RX ORDER — SODIUM CHLORIDE 0.9 % (FLUSH) 0.9 %
5-40 SYRINGE (ML) INJECTION PRN
Status: CANCELLED | OUTPATIENT
Start: 2021-06-16

## 2021-06-11 RX ORDER — DIPHENHYDRAMINE HYDROCHLORIDE 50 MG/ML
25 INJECTION INTRAMUSCULAR; INTRAVENOUS ONCE
Status: CANCELLED | OUTPATIENT
Start: 2021-06-16 | End: 2021-06-16

## 2021-06-11 RX ORDER — ACETAMINOPHEN 325 MG/1
650 TABLET ORAL ONCE
Status: CANCELLED | OUTPATIENT
Start: 2021-06-16 | End: 2021-06-16

## 2021-06-11 RX ORDER — MEPERIDINE HYDROCHLORIDE 25 MG/ML
12.5 INJECTION INTRAMUSCULAR; INTRAVENOUS; SUBCUTANEOUS ONCE
Status: CANCELLED | OUTPATIENT
Start: 2021-06-16 | End: 2021-06-16

## 2021-06-11 RX ORDER — DIPHENHYDRAMINE HYDROCHLORIDE 50 MG/ML
25 INJECTION INTRAMUSCULAR; INTRAVENOUS ONCE
Status: COMPLETED | OUTPATIENT
Start: 2021-06-11 | End: 2021-06-11

## 2021-06-11 RX ORDER — METHYLPREDNISOLONE SODIUM SUCCINATE 125 MG/2ML
100 INJECTION, POWDER, LYOPHILIZED, FOR SOLUTION INTRAMUSCULAR; INTRAVENOUS ONCE
Status: COMPLETED | OUTPATIENT
Start: 2021-06-11 | End: 2021-06-11

## 2021-06-11 RX ADMIN — METHYLPREDNISOLONE SODIUM SUCCINATE 100 MG: 125 INJECTION, POWDER, FOR SOLUTION INTRAMUSCULAR; INTRAVENOUS at 07:53

## 2021-06-11 RX ADMIN — ACETAMINOPHEN 650 MG: 325 TABLET ORAL at 07:52

## 2021-06-11 RX ADMIN — DIPHENHYDRAMINE HYDROCHLORIDE 25 MG: 50 INJECTION INTRAMUSCULAR; INTRAVENOUS at 07:53

## 2021-06-11 RX ADMIN — SODIUM CHLORIDE 1000 MG: 9 INJECTION, SOLUTION INTRAVENOUS at 08:34

## 2021-06-11 RX ADMIN — SODIUM CHLORIDE: 9 INJECTION, SOLUTION INTRAVENOUS at 08:44

## 2021-06-11 ASSESSMENT — PAIN SCALES - GENERAL: PAINLEVEL_OUTOF10: 0

## 2021-06-11 NOTE — PROGRESS NOTES
Pt seen and assessed at [facility] for Rituximab infusion per orders from Dr. Shoaib Jensen. Infused per [facility] policy. Monitoring completed for infusion reactions - see flowsheet. Pt tolerated infusion well and without incident. Pt verbalizes understanding of discharge instructions. Discharged ambulatory to home with Mother.

## 2021-06-17 ENCOUNTER — HOSPITAL ENCOUNTER (OUTPATIENT)
Dept: PHYSICAL THERAPY | Age: 56
Setting detail: THERAPIES SERIES
Discharge: HOME | End: 2021-06-17
Payer: COMMERCIAL

## 2021-06-17 DIAGNOSIS — M81.0 SENILE OSTEOPOROSIS: ICD-10-CM

## 2021-06-17 PROCEDURE — 97110 THERAPEUTIC EXERCISES: CPT | Performed by: PHYSICAL THERAPIST

## 2021-06-17 PROCEDURE — 97140 MANUAL THERAPY 1/> REGIONS: CPT | Performed by: PHYSICAL THERAPIST

## 2021-06-17 PROCEDURE — 97112 NEUROMUSCULAR REEDUCATION: CPT | Performed by: PHYSICAL THERAPIST

## 2021-06-17 RX ORDER — EPINEPHRINE 1 MG/ML
0.3 INJECTION, SOLUTION, CONCENTRATE INTRAVENOUS PRN
Status: CANCELLED | OUTPATIENT
Start: 2021-06-22

## 2021-06-17 RX ORDER — SODIUM CHLORIDE 9 MG/ML
25 INJECTION, SOLUTION INTRAVENOUS PRN
Status: CANCELLED | OUTPATIENT
Start: 2021-06-22

## 2021-06-17 RX ORDER — SODIUM CHLORIDE 0.9 % (FLUSH) 0.9 %
5-40 SYRINGE (ML) INJECTION PRN
Status: CANCELLED | OUTPATIENT
Start: 2021-06-22

## 2021-06-17 RX ORDER — DIPHENHYDRAMINE HYDROCHLORIDE 50 MG/ML
50 INJECTION INTRAMUSCULAR; INTRAVENOUS ONCE
Status: CANCELLED | OUTPATIENT
Start: 2021-06-22 | End: 2021-06-22

## 2021-06-17 RX ORDER — ZOLEDRONIC ACID 5 MG/100ML
5 INJECTION, SOLUTION INTRAVENOUS ONCE
Status: CANCELLED | OUTPATIENT
Start: 2021-06-22 | End: 2021-06-22

## 2021-06-17 RX ORDER — SODIUM CHLORIDE 9 MG/ML
INJECTION, SOLUTION INTRAVENOUS CONTINUOUS
Status: CANCELLED | OUTPATIENT
Start: 2021-06-22

## 2021-06-17 RX ORDER — HEPARIN SODIUM (PORCINE) LOCK FLUSH IV SOLN 100 UNIT/ML 100 UNIT/ML
500 SOLUTION INTRAVENOUS PRN
Status: CANCELLED | OUTPATIENT
Start: 2021-06-22

## 2021-06-17 RX ORDER — METHYLPREDNISOLONE SODIUM SUCCINATE 125 MG/2ML
125 INJECTION, POWDER, LYOPHILIZED, FOR SOLUTION INTRAMUSCULAR; INTRAVENOUS ONCE
Status: CANCELLED | OUTPATIENT
Start: 2021-06-22 | End: 2021-06-22

## 2021-06-17 NOTE — DISCHARGE SUMMARY
[facility], [address], [address] [place], [place]                    Discharge Note   Date:  2021    Patient Name:  Clementina Horowitz    :  1965  MRN: [id number]  Restrictions/Precautions:    Physician Information:  Referring Practitioner: Mohit  Medical/Treatment Diagnosis Information:  · Diagnosis: M25.512, G89.29 (ICD-10-CM) - Chronic pain in left shoulder  Treatment Diagnosis: Left shoulder pain M25.512, Cervical pain M54.2  [] Conservative / [] Surgical - DOS:   Insurance/Certification information:     Plan of care signed: [] YES  [] NO  Number of Comorbidities:  []0     [x]1-2    []3+      Is this a Progress Report:     []  Yes  [x]  No      If Yes:  Date Range for reporting period:  Beginning 2021  Ending 2021    Progress report will be due (10 Rx or 30 days whichever is less):        Recertification will be due (POC Duration  / 90 days whichever is less): 2021     Visit # Insurance Allowable Requires auth   4    ? []no        []yes:        Latex Allergy:  [x]NO      []YES  Preferred Language for Healthcare:   [x]English       []other:      SUBJECTIVE:  Pt reports she is doing well and this will be her LV.     OBJECTIVE:    Initial Initial Current   VAS 4-5   0   UEFI 75/80   80/80    ROM Left Right     Shoulder Flex 141   150, symmetrical;  reported normal    Shoulder Abd 141   160 reported normal    Shoulder ER Behind the head   T1    Shoulder IR L p    T10    Strength  Left Right UT dominance with scap work    Shoulder Flex 4+   4+    Shoulder Scap 4    4+   Shoulder ER 4    4+   Shoulder IR 4    4+   THORACIC ROM         flexion         ext     R/L   Rotation R/L WNL WNL R/L   SB R/L            CERV ROM Left Right     Cervical Flexion WNL   66    Cervical Extension WNL   35    Cervical SB R/L ~35 R/L   R/L  33/30   Cervical rotation R/L ~40 ~55 R/L 54/42  Observation:   Palpation:    Access Code: [email or web address]  URL: [email or web address]  Date: 06/17/2021  Prepared by: Kyle Trimble    Exercises  Sidelying Thoracic Rotation with Open Book - 2-3 x daily - 7 x weekly - 1 sets - 10 reps  Sleeper Stretch - 2-3 x daily - 7 x weekly - 2-3 sets - 10 reps  Seated Cervical Rotation AROM - 2-3 x daily - 7 x weekly - 2-3 sets - 10 reps  Supine Cervical Rotation AROM on Pillow - 2-3 x daily - 7 x weekly - 2-3 sets - 10 reps  Shoulder External Rotation and Scapular Retraction with Resistance - 2-3 x daily - 7 x weekly - 2-3 sets - 10 reps  Seated Passive Cervical Retraction - 2-3 x daily - 7 x weekly - 2-3 sets - 10 reps       RESTRICTIONS/PRECAUTIONS:  polymyositis    Exercises/Interventions:   Therapeutic Ex Sets/reps Notes   No money with cerv ret x20    SBS x20    cerv ret seated x10    Chin tuck supine :3x10    Doorway stretch :10 x4    Seated cervical SNAG with pillowcase 2x5 to L, x5 R    TB ext 2x10 YTB   Pt ed posture, reading posture/seated posture for travel 4 min                                                           Manual Intervention     GH mobs/PROM L rotation 2x10    STW L paraspinals/UT/lev 3 min    Manual traction  relieving   scap mobs 4 min    Upper thoracic mobs G2 4 min              NMR re-education                                                 Therapeutic Exercise and NMR EXR  [x] (08942) Provided verbal/tactile cueing for activities related to strengthening, flexibility, endurance, ROM  for improvements in scapular, scapulothoracic and UE control with self care, reaching, carrying, lifting, house/yardwork, driving/computer work.    [] (05574) Provided verbal/tactile cueing for activities related to improving balance, coordination, kinesthetic sense, posture, motor skill, proprioception  to assist with  scapular, scapulothoracic and UE control with self care, reaching, carrying, lifting, house/yardwork, driving/computer work.    Therapeutic Activities:    [] (32996 or 39308) Provided verbal/tactile cueing for activities related to improving balance, coordination, kinesthetic sense, posture, motor skill, proprioception and motor activation to allow for proper function of scapular, scapulothoracic and UE control with self care, carrying, lifting, driving/computer work.      Home Exercise Program:    [x] (45783) Reviewed/Progressed HEP activities related to strengthening, flexibility, endurance, ROM of scapular, scapulothoracic and UE control with self care, reaching, carrying, lifting, house/yardwork, driving/computer work  [] (25201) Reviewed/Progressed HEP activities related to improving balance, coordination, kinesthetic sense, posture, motor skill, proprioception of scapular, scapulothoracic and UE control with self care, reaching, carrying, lifting, house/yardwork, driving/computer work      Manual Treatments:  PROM / STM / Oscillations-Mobs:  G-I, II, III, IV (PA's, Inf., Post.)  [x] (54252) Provided manual therapy to mobilize soft tissue/joints of cervical/CT, scapular GHJ and UE for the purpose of modulating pain, promoting relaxation,  increasing ROM, reducing/eliminating soft tissue swelling/inflammation/restriction, improving soft tissue extensibility and allowing for proper ROM for normal function with self care, reaching, carrying, lifting, house/yardwork, driving/computer work    Modalities:    [] GR/ESU 15 min    [] GR 15 min  [] ESU     [] CP    [] MHP    [] declined    Charges:  Timed Code Treatment Minutes: 45   Total Treatment Minutes: 45     [] EVAL (LOW) 26571 (typically 20 minutes face-to-face)  [] EVAL (MOD) 68234 (typically 30 minutes face-to-face)  [] EVAL (HIGH) 42446 (typically 45 minutes face-to-face)  [] RE-EVAL     [x] TD(30176) x   1  [] IONTO  [x] NMR (00507) x  1   [] VASO  [x] Manual (05534) x   1   [] Other:  [] TA x      [] Mech Traction (22010)  [] ES(attended) (42380)      [] ES (un) (13873): GOALS:  Long Term Goals: To be achieved in: 6 weeks  1. Disability index score of 75/80or less for the UEFI  to assist with reaching prior level of function. [] Progressing: [x] Met: [] Not Met: [] Adjusted  2. Patient will demonstrate increased AROM to WNL to allow for proper joint functioning as indicated by patients Functional Deficits. [x] Progressing: deficits in L rotation [] Met: [] Not Met: [] Adjusted  3. Patient will demonstrate an increase in Strength >/=4+/5 to allow for proper functional mobility as indicated by patients Functional Deficits. [] Progressing: [x] Met: [] Not Met: [] Adjusted  4. Patient will return to  driving functional activities without increased symptoms or restriction. [] Progressing: [x] Met: [] Not Met: [] Adjusted  5. Pt will demonstrate appropriate postural awareness with ergonomic set up at home. [] Progressing: [x] Met: [] Not Met: [] Adjusted     Overall Progression Towards Functional goals/ Treatment Progress Update:  [] Patient is progressing as expected towards functional goals listed. [] Progression is slowed due to complexities/Impairments listed. [] Progression has been slowed due to co-morbidities. [] Plan just implemented, too soon to assess goals progression <30days   [] Goals require adjustment due to lack of progress  [] Patient is not progressing as expected and requires additional follow up with physician  [] Other    Prognosis for POC: [x] Good [] Fair  [] Poor      Patient requires continued skilled intervention: [x] Yes  [] No      ASSESSMENT: Discussed posture and progression to improve L rotation. Pt demonstrates competence in body awareness, symptom management, and exercise progression. Will d/c this visit per patient requires.     Treatment/Activity Tolerance:  [x] Patient tolerated treatment well [] Patient limited by fatique  [] Patient limited by pain  [] Patient limited by other medical complications  [] Other:     Prognosis: [] Good [] Fair  [] Poor    Patient Requires Follow-up: [x] Yes  [] No    PLAN: See eval  [] Continue per plan of care [] Alter current plan (see comments above)  [] Plan of care initiated [] Hold pending MD visit [x] Discharge      Electronically signed by:  Klaus Pedraza, PT , DPT, CDNT         Note: If patient does not return for scheduled/ recommended follow up visits, this note will serve as a discharge from care along with most recent update on progress.

## 2021-06-22 ENCOUNTER — NURSE ONLY (OUTPATIENT)
Dept: RHEUMATOLOGY | Age: 56
End: 2021-06-22
Payer: COMMERCIAL

## 2021-06-22 VITALS
TEMPERATURE: 98.8 F | HEART RATE: 78 BPM | SYSTOLIC BLOOD PRESSURE: 122 MMHG | DIASTOLIC BLOOD PRESSURE: 70 MMHG | RESPIRATION RATE: 16 BRPM

## 2021-06-22 DIAGNOSIS — M81.0 SENILE OSTEOPOROSIS: Primary | ICD-10-CM

## 2021-06-22 PROCEDURE — 96365 THER/PROPH/DIAG IV INF INIT: CPT | Performed by: INTERNAL MEDICINE

## 2021-06-22 RX ORDER — SODIUM CHLORIDE 9 MG/ML
25 INJECTION, SOLUTION INTRAVENOUS PRN
Status: CANCELLED | OUTPATIENT
Start: 2021-06-22

## 2021-06-22 RX ORDER — EPINEPHRINE 1 MG/ML
0.3 INJECTION, SOLUTION, CONCENTRATE INTRAVENOUS PRN
Status: CANCELLED | OUTPATIENT
Start: 2021-06-22

## 2021-06-22 RX ORDER — DIPHENHYDRAMINE HYDROCHLORIDE 50 MG/ML
50 INJECTION INTRAMUSCULAR; INTRAVENOUS ONCE
Status: CANCELLED | OUTPATIENT
Start: 2021-06-22 | End: 2021-06-22

## 2021-06-22 RX ORDER — ZOLEDRONIC ACID 5 MG/100ML
5 INJECTION, SOLUTION INTRAVENOUS ONCE
Status: CANCELLED | OUTPATIENT
Start: 2021-06-22 | End: 2021-06-22

## 2021-06-22 RX ORDER — HEPARIN SODIUM (PORCINE) LOCK FLUSH IV SOLN 100 UNIT/ML 100 UNIT/ML
500 SOLUTION INTRAVENOUS PRN
Status: CANCELLED | OUTPATIENT
Start: 2021-06-22

## 2021-06-22 RX ORDER — SODIUM CHLORIDE 0.9 % (FLUSH) 0.9 %
5-40 SYRINGE (ML) INJECTION PRN
Status: CANCELLED | OUTPATIENT
Start: 2021-06-22

## 2021-06-22 RX ORDER — METHYLPREDNISOLONE SODIUM SUCCINATE 125 MG/2ML
125 INJECTION, POWDER, LYOPHILIZED, FOR SOLUTION INTRAMUSCULAR; INTRAVENOUS ONCE
Status: CANCELLED | OUTPATIENT
Start: 2021-06-22 | End: 2021-06-22

## 2021-06-22 RX ORDER — SODIUM CHLORIDE 9 MG/ML
INJECTION, SOLUTION INTRAVENOUS CONTINUOUS
Status: CANCELLED | OUTPATIENT
Start: 2021-06-22

## 2021-06-22 RX ORDER — ZOLEDRONIC ACID 5 MG/100ML
5 INJECTION, SOLUTION INTRAVENOUS ONCE
Status: COMPLETED | OUTPATIENT
Start: 2021-06-22 | End: 2021-06-22

## 2021-06-22 RX ADMIN — ZOLEDRONIC ACID 5 MG: 5 INJECTION, SOLUTION INTRAVENOUS at 15:45

## 2021-06-22 NOTE — PROGRESS NOTES
Pt here for Reclast infusion #1, no follow up visit  or labs today. Denied any recent dental surgery. Recently seen by Dentist. Plan for \"filling repair\" in July. Pt to notify dentist of today's infusion. Educated patient on today's  medication,  visit process, and when to notify office. Questions answered. Pt tolerated infusion well and without incident. Pt verbalizes understanding of discharge instructions. Discharged ambulatory to home.      V Gauge: 24  IV Site: left antecubital  # of Attempts: 1  IV Start: 1545 pm  IV Stop: 1605 pm

## 2021-06-22 NOTE — PATIENT INSTRUCTIONS
Notify your Dentist  you have received Reclast.  Notify your MD and report  any jaw, mouth, teeth, gum changes or pain. Notify your MD report any unexplained/new Hip pain/discomfort. zoledronic acid  Pronunciation:  CHRIS Nichole AS id  Brand:  Reclast, Zometa  What is the most important information I should know about zoledronic acid? Do not use if you are pregnant. Use effective birth control, and ask your doctor how long to prevent pregnancy after you stop using zoledronic acid. Zoledronic acid can cause serious kidney problems,  especially if you are dehydrated, if you take diuretic medicine, or if you already have kidney disease. Call your doctor if you urinate less than usual, if you have swelling in your feet or ankles, or if you feel tired or short of breath. Also call your doctor if you have muscle spasms, numbness or tingling (in hands and feet or around the mouth), new or unusual hip pain, or severe pain in your joints, bones, or muscles. What is zoledronic acid? Reclast and Zometa are two different brands of zoledronic acid. Reclast is used to treat or prevent osteoporosis caused by menopause, or steroid use. This medicine also increases bone mass in men with osteoporosis. Reclast is for use when you have a high risk of bone fracture. Reclast is also used to treat Paget's disease of bone. Zometa is used to treat high blood levels of calcium caused by cancer (also called hypercalcemia of malignancy). This medicine also treats multiple myeloma (a type of bone marrow cancer) or bone cancer that has spread from elsewhere in the body. You should not use Reclast and Zometa at the same time. Zoledronic acid may also be used for purposes not listed in this medication guide. What should I discuss with my healthcare provider before receiving zoledronic acid? You should not be treated with zoledronic acid if you are allergic to it.   You also should not receive Reclast if you have:  · low levels of calcium in your blood (hypocalcemia); or  · severe kidney disease. You should not be treated with zoledronic acid if are currently using any other bisphosphonate (such as alendronate, etidronate, ibandronate, pamidronate, risedronate, or tiludronate). Tell your doctor if you have ever had:  · kidney disease;  · hypocalcemia;  · thyroid or parathyroid surgery;  · surgery to remove part of your intestine;  · asthma caused by taking aspirin;  · any condition that makes it hard for your body to absorb nutrients from food (malabsorption);  · a dental problem (you may need a dental exam before you receive zoledronic acid);  · if you are dehydrated; or  · if you take a diuretic or \"water pill\". Zoledronic acid can cause serious kidney problems,  especially if you are dehydrated, if you take diuretic medicine, or if you already have kidney disease. This medicine may cause jaw bone problems (osteonecrosis). The risk is highest in people with cancer, blood cell disorders, pre-existing dental problems, or people treated with steroids, chemotherapy, or radiation. Ask your doctor about your own risk. You may need to have a negative pregnancy test before starting this treatment. Do not use if you are pregnant. This medicine may harm an unborn baby or cause birth defects. Zoledronic acid can remain in your body for weeks or years after your last dose. Use effective birth control to prevent pregnancy while using this medicine. Talk with your doctor about the need to prevent pregnancy after you stop using zoledronic acid. This medicine may affect fertility (ability to have children) in women. However, it is important to use birth control to prevent pregnancy because zoledronic acid can harm an unborn baby. You should not breastfeed while using zoledronic acid. How is zoledronic acid given? Zoledronic acid is given as an infusion into a vein. A healthcare provider will give you this injection.   Zoledronic acid is sometimes given as a single dose only one time. It may also be given once every 1 or 2 years. How often you receive zoledronic acid will depend on why you are using this medicine. Follow your doctor's instructions. Drink at least 2 glasses of water within a few hours before your injection to keep from getting dehydrated. You will need frequent medical tests. Pay special attention to your dental hygiene while using zoledronic acid. Brush and floss your teeth regularly. If you need to have any dental work (especially surgery), tell the dentist ahead of time that you are using zoledronic acid. Zoledronic acid is only part of a complete program of treatment that may also include diet changes and taking calcium and vitamin supplements. Follow your doctor's instructions very closely. Your doctor will determine how long to treat you with this medicine. Zoledronic acid is often given for only 3 to 5 years. What happens if I miss a dose? Call your doctor for instructions if you miss an appointment for your zoledronic acid injection. What happens if I overdose? Seek emergency medical attention or call the Poison Help line at 1-722.790.2500. What should I avoid while receiving zoledronic acid? Avoid smoking, or try to quit. Smoking can reduce your bone mineral density, making fractures more likely. Avoid drinking large amounts of alcohol. Heavy drinking can also cause bone loss. What are the possible side effects of zoledronic acid? Get emergency medical help if you have signs of an allergic reaction: hives; wheezing, chest tightness, trouble breathing; swelling of your face, lips, tongue, or throat.   Call your doctor at once if you have:  · new or unusual pain in your thigh or hip;  · jaw pain or numbness, red or swollen gums, loose teeth, or slow healing after dental work;  · severe joint, bone, or muscle pain;  · kidney problems --little or no urination, swelling in your feet or ankles, feeling tired;  · low red blood cells (anemia) --pale skin, unusual tiredness, feeling light-headed or short of breath, cold hands and feet; or  · low calcium levels --muscle spasms or contractions, numbness or tingly feeling (around your mouth, or in your fingers and toes). Serious side effects on the kidneys may be more likely in older adults. Common side effects may include:  · trouble breathing;  · nausea, vomiting, diarrhea, constipation;  · bone pain, muscle or joint pain;  · fever or other flu symptoms;  · tiredness;  · eye pain or swelling;  · pain in your arms or legs;  · headache; or  · anemia. This is not a complete list of side effects and others may occur. Call your doctor for medical advice about side effects. You may report side effects to FDA at 4-554-FDA-7198. What other drugs will affect zoledronic acid? Zoledronic acid can harm your kidneys, especially if you also use certain medicines for infections, cancer, osteoporosis, organ transplant rejection, bowel disorders, high blood pressure, or pain or arthritis (including Advil, Motrin, and Aleve). Other drugs may affect zoledronic acid, including prescription and over-the-counter medicines, vitamins, and herbal products. Tell your doctor about all your current medicines and any medicine you start or stop using. Where can I get more information? Your doctor or pharmacist can provide more information about zoledronic acid. Remember, keep this and all other medicines out of the reach of children, never share your medicines with others, and use this medication only for the indication prescribed. Every effort has been made to ensure that the information provided by [facility] is accurate, up-to-date, and complete, but no guarantee is made to that effect. Drug information contained herein may be time sensitive.  [facility] information has been compiled for use by healthcare practitioners and consumers in the [place] and therefore [facility] does not warrant that uses outside of the [place] are appropriate, unless specifically indicated otherwise. [facility] drug information does not endorse drugs, diagnose patients or recommend therapy. [facility] drug information is an informational resource designed to assist licensed healthcare practitioners in caring for their patients and/or to serve consumers viewing this service as a supplement to, and not a substitute for, the expertise, skill, knowledge and judgment of healthcare practitioners. The absence of a warning for a given drug or drug combination in no way should be construed to indicate that the drug or drug combination is safe, effective or appropriate for any given patient. [facility] does not assume any responsibility for any aspect of healthcare administered with the aid of information [facility] provides. The information contained herein is not intended to cover all possible uses, directions, precautions, warnings, drug interactions, allergic reactions, or adverse effects. If you have questions about the drugs you are taking, check with your doctor, nurse or pharmacist.  Copyright 6103-7483 [address]: 18.01. Revision date: 2/26/2021. Care instructions adapted under license by [facility] ([facility]). If you have questions about a medical condition or this instruction, always ask your healthcare professional. [place] [address] any warranty or liability for your use of this information.

## 2021-07-26 RX ORDER — PREDNISONE 20 MG/1
TABLET ORAL
Qty: 90 TABLET | Refills: 0 | Status: SHIPPED | OUTPATIENT
Start: 2021-07-26 | End: 2022-03-17

## 2021-08-06 ENCOUNTER — HOSPITAL ENCOUNTER (OUTPATIENT)
Dept: PULMONOLOGY | Age: 56
Discharge: HOME | End: 2021-08-06
Payer: COMMERCIAL

## 2021-08-06 VITALS — OXYGEN SATURATION: 94 %

## 2021-08-06 DIAGNOSIS — J84.9 ILD (INTERSTITIAL LUNG DISEASE) (HCC): ICD-10-CM

## 2021-08-06 PROCEDURE — 6360000002 HC RX W HCPCS: Performed by: INTERNAL MEDICINE

## 2021-08-06 PROCEDURE — 94729 DIFFUSING CAPACITY: CPT

## 2021-08-06 PROCEDURE — 94760 N-INVAS EAR/PLS OXIMETRY 1: CPT

## 2021-08-06 PROCEDURE — 94618 PULMONARY STRESS TESTING: CPT

## 2021-08-06 PROCEDURE — 94060 EVALUATION OF WHEEZING: CPT

## 2021-08-06 PROCEDURE — 94726 PLETHYSMOGRAPHY LUNG VOLUMES: CPT

## 2021-08-06 PROCEDURE — 94664 DEMO&/EVAL PT USE INHALER: CPT

## 2021-08-06 RX ORDER — ALBUTEROL SULFATE 2.5 MG/3ML
2.5 SOLUTION RESPIRATORY (INHALATION) ONCE
Status: COMPLETED | OUTPATIENT
Start: 2021-08-06 | End: 2021-08-06

## 2021-08-06 RX ADMIN — ALBUTEROL SULFATE 2.5 MG: 2.5 SOLUTION RESPIRATORY (INHALATION) at 16:38

## 2021-08-06 NOTE — PROGRESS NOTES
Six Minute Walk     []  Desaturation Screening []  Endurance Test       Name:  Naman Ontiveros Record Number:  [id number]  Age: 54 y.o. Gender: female  : 1965  Today's Date:  2021  Pulmonary History:ILD  Home Oxygen Therapy:  room air  Home Respiratory Therapy:None                    6 MINUTE WALK DATA    Modality Time (Minutes) SPO2 RR B/P Heart Rate O2 SOB Pain Level   Rest 2 94 20 119/89 70 ra 0 0   Walk 1 92   74 ra 0 0   Walk 2 91   77 ra 0 0   Walk 3 86   72 ra 0 0   Walk 4 91   81 2 lpm 0 0   Walk 5 89   72 2 lpm 0 0   Walk 6 87   77 2-3-lpm, 0 0   Recovery 2 96 24 113/77 75 ra 0 0     Nazia SOB Scale:  0=Nothing at all; 0.5=Very, very slight; 1=Very slight; 2=Slight; 3=Moderate; 4=Somewhat severe; 5=Severe; 7=Very Severe; 10=Very, very Severe    Exercise Summary:  Distance Walked (feet): 540  Lowest SpO2 During Walk:  87  (FIO2)  2-3- Walking Pace (Steps per Minute)  90  Stopped or Paused during Walk:  yes    Comments / Reason:  Stopped to put nasal cannula on. Edwin's spo2 recovered quickly.      Electronically signed by Babs Cavanaugh RCP on 2021 at 4:42 PM

## 2021-08-14 NOTE — PROCEDURES
[address], [address]                               PULMONARY FUNCTION    PATIENT NAME: Rosio Bee                   :        1965  MED REC NO:   [id number]                          ROOM:  ACCOUNT NO:   [de-identified]                           ADMIT DATE: 2021  PROVIDER:     Dixie Jaam MD    DATE OF PROCEDURE:  2021    INDICATION:  ILD. BMI:  26.9. TOBACCO HISTORY:  Never smoked. Spirometry data is acceptable and reproducible. Lung volumes performed via plethysmography. Diffusion capacity not adjusted for hemoglobin. Room air oxygen saturation is 94%. SPIROMETRY:  FEV1 to FVC ratio is 81%. Prebronchodilator FEV1 is 1.8,  which is 78% of predicted. Prebronchodilator FVC is 2.63, which is 75%  of predicted. Lung volumes showed total lung capacity of 3.88, which is 87% of  predicted. Residual volume is 1.89, which is 111% of predicted. Diffusion capacity is 16.37, which is 72% of predicted. IMPRESSION:  1. No obstructive defect. 2.  There is a significant response to bronchodilators in the small  airways. 3.  Normal lung volumes. 4.  Mild decrease in diffusion capacity. 5.  When compared to previous PFT dated 2021, there is a  significant increase in FVC and FEV1. There is also a significant  improvement in total lung capacity and residual volume. There is also  significant improvement in diffusion capacity. A 6 minute walk was performed on room air. Baseline oxygen saturation  was 94%. With submaximal exercise, the patient had an oxygen  desaturation to 86%. She was placed on oxygen and at 3 liters  maintained an oxygen saturation of 88% or better. The patient walked a total of 540 feet. She did not stop or pause  during the walk. The patient's oxygen saturation was noted to recover  quickly.         Nidia Navarro MD    D: 2021 14:58:33       T: 08/13/2021 23:37:36     [email or web address]  Job#: [id number]     Doc#: [id number]    CC:

## 2021-08-23 ENCOUNTER — OFFICE VISIT (OUTPATIENT)
Dept: PULMONOLOGY | Age: 56
End: 2021-08-23
Payer: COMMERCIAL

## 2021-08-23 VITALS
WEIGHT: 139 LBS | HEIGHT: 60 IN | OXYGEN SATURATION: 99 % | DIASTOLIC BLOOD PRESSURE: 72 MMHG | SYSTOLIC BLOOD PRESSURE: 110 MMHG | TEMPERATURE: 96.5 F | HEART RATE: 76 BPM | BODY MASS INDEX: 27.29 KG/M2

## 2021-08-23 DIAGNOSIS — J84.9 ILD (INTERSTITIAL LUNG DISEASE) (HCC): Primary | ICD-10-CM

## 2021-08-23 PROCEDURE — 1036F TOBACCO NON-USER: CPT | Performed by: INTERNAL MEDICINE

## 2021-08-23 PROCEDURE — 99214 OFFICE O/P EST MOD 30 MIN: CPT | Performed by: INTERNAL MEDICINE

## 2021-08-23 PROCEDURE — G8419 CALC BMI OUT NRM PARAM NOF/U: HCPCS | Performed by: INTERNAL MEDICINE

## 2021-08-23 PROCEDURE — G8427 DOCREV CUR MEDS BY ELIG CLIN: HCPCS | Performed by: INTERNAL MEDICINE

## 2021-08-23 PROCEDURE — 3017F COLORECTAL CA SCREEN DOC REV: CPT | Performed by: INTERNAL MEDICINE

## 2021-08-23 ASSESSMENT — ENCOUNTER SYMPTOMS
WHEEZING: 0
SHORTNESS OF BREATH: 1
CHEST TIGHTNESS: 0
COUGH: 0

## 2021-08-23 NOTE — PROGRESS NOTES
[facility]    Outpatient Note    Subjective:   CHIEF COMPLAINT:   No chief complaint on file. Interval history on 8/23/21  Overall she feels better. She is now on IVIG and Rituxan. There is some SOB with exertion depending on what is she doing. Now she doesn't feels as bad with walking up stairs. HPI:  5/5/21   The patient is 54 y.o. female who presents today for a new patient visit for evaluation of ILD. She had myositis started 2 years ago. She follows with Dr. Edwin Tinajero every 6-8 weeks. She was complaining of arm pain for which she had an X ray, were she was found to have lung abnormalities followed by high res CT and pulmonary function test.      At this time there is no SOB at rest, however she does have HERNANDEZ if going up stairs or if walking up hills. She does aerobics twice a week without any problem. There is no cough. There is no fever or chills. She gets cold often but she attributes it to hypothyroidism. She has some form of dermatitis since she was a child. Two years ago she started having arm pain, followed by hand and fingers swelling, and was diagnosed with mechanics hand and antisythetase syndrome. She was also having flair of scalp and neck dermatitis consistent with dermatomyositis. Years ago she was on albuterol but after deviated septum surgery she was able to come off albuterol. Hasn't used for ~10 years. Never smoker, however she is a passive smoker.       Past Medical History:    Past Medical History:   Diagnosis Date    Allergic rhinitis     Arthritis     Asthma     Chronic sinusitis     Dermatitis     Hypothyroidism     Polymyositis ([address]     Senile osteoporosis 6/17/2021       Social History:    Social History     Tobacco Use   Smoking Status Passive Smoke Exposure - Never Smoker   Smokeless Tobacco Never Used       Family History:  Family History   Problem Relation Age of Onset    Heart Disease Mother afib    Stroke Mother     Atrial Fibrillation Mother     COPD Mother     Diabetes Father        Current Medications:  Current Outpatient Medications on File Prior to Visit   Medication Sig Dispense Refill    predniSONE (DELTASONE) 20 MG tablet TAKE 1 TABLET BY MOUTH  DAILY 90 tablet 0    levothyroxine (SYNTHROID) 100 MCG tablet TAKE 1 TABLET BY MOUTH  DAILY 90 tablet 3    sulfamethoxazole-trimethoprim (BACTRIM DS;SEPTRA DS) 800-160 MG per tablet Take 1 tab three times a week. M/W/F 36 tablet 1    Lactobacillus (PROBIOTIC ACIDOPHILUS) CAPS Take one capsule once daily 30 capsule 5    ibuprofen (ADVIL;MOTRIN) 600 MG tablet Take 1 tablet by mouth every 6 hours as needed for Pain 60 tablet 1    sulfamethoxazole-trimethoprim (BACTRIM DS;SEPTRA DS) 800-160 MG per tablet Take 3 times a week- M/W/F (Patient not taking: Reported on 8/23/2021) 36 tablet 3    hydroxychloroquine (PLAQUENIL) 200 MG tablet TAKE ONE TABLET BY MOUTH TWICE A DAY (Patient not taking: Reported on 8/23/2021) 180 tablet 1    zoledronic acid (RECLAST) 5 MG/100ML SOLN Infuse 100 mLs intravenously once for 1 dose 100 mL 0    Apremilast 10 & 20 & 30 MG TBPK As per starter pack directions. (Patient not taking: Reported on 8/23/2021) 1 each 0    Apremilast (OTEZLA) 30 MG TABS Take 30 mg by mouth 2 times daily Start after completing the starter pack. (Patient not taking: Reported on 8/23/2021) 60 tablet 3    levothyroxine (SYNTHROID) 100 MCG tablet TAKE 1 TABLET DAILY (Patient not taking: Reported on 8/23/2021) 90 tablet 0    mycophenolate (CELLCEPT) 500 MG tablet Take 3 tablets by mouth 2 times daily (Patient not taking: Reported on 8/23/2021) 180 tablet 2    predniSONE (DELTASONE) 5 MG tablet Take 2 tab po daily x 15 days, take1.5 mg po daily x 15 days then take 1 tab po daily. (Patient not taking: Reported on 8/23/2021) 120 tablet 1    clobetasol (TEMOVATE) 0.05 % external solution Apply a small amount daily for 2 weeks or until improved. (Patient not taking: Reported on 8/23/2021) 100 mL 3    clobetasol (TEMOVATE) 0.05 % ointment Apply to affected area twice daily on body and hands (Patient not taking: Reported on 8/23/2021) 60 g 2    triamcinolone (KENALOG) 0.025 % cream Apply to affected area on face twice daily (Patient not taking: Reported on 8/23/2021) 80 g 2    ALBUTEROL SULFATE IN Inhale into the lungs as needed (Patient not taking: Reported on 8/23/2021)       No current facility-administered medications on file prior to visit. REVIEW OF SYSTEMS:    Review of Systems   Constitutional: Negative for chills, fatigue, fever and unexpected weight change. HENT: Negative for congestion. Respiratory: Positive for shortness of breath (HERNANDEZ). Negative for cough, chest tightness and wheezing. Cardiovascular: Negative for chest pain, palpitations and leg swelling. Musculoskeletal: Positive for myalgias (mainly in left arm) and neck pain. Skin: Positive for rash. Neurological: Negative for weakness. Psychiatric/Behavioral: The patient is not nervous/anxious. All other systems reviewed and are negative. Objective:   PHYSICAL EXAM:        VITALS:  /72 (Site: Left Upper Arm, Position: Sitting, Cuff Size: Medium Adult)   Pulse 76   Temp 96.5 °F (35.8 °C) (Infrared)   Ht 5' (1.524 m)   Wt 139 lb (63 kg)   LMP 09/12/2013   SpO2 99%   Breastfeeding Yes   BMI 27.15 kg/m²     Physical Exam  Vitals reviewed. Constitutional:       Appearance: She is well-developed. HENT:      Head: Normocephalic and atraumatic. Eyes:      Extraocular Movements: Extraocular movements intact. Pupils: Pupils are equal, round, and reactive to light. Neck:      Vascular: No JVD. Cardiovascular:      Rate and Rhythm: Normal rate and regular rhythm. Heart sounds: No murmur heard. Pulmonary:      Effort: Pulmonary effort is normal. No respiratory distress. Breath sounds: No stridor. Rales present. No wheezing. Abdominal:      General: Bowel sounds are normal.      Palpations: Abdomen is soft. Musculoskeletal:         General: No deformity. Cervical back: Neck supple. Right lower leg: No edema. Left lower leg: No edema. Skin:     General: Skin is warm and dry. Neurological:      Mental Status: She is alert and oriented to person, place, and time. Psychiatric:         Behavior: Behavior normal.         DATA:    CT chest on 5/3/21  HISTORY:  54year-old female with anti-synthesize syndrome with shortness of breath and    abnormal PFT.       TECHNICAL FACTORS:  Standard CT technique was utilized.       COMPARISON:  None.       FINDINGS:  Trachea and bilateral bronchi are clear.       Lower lobe dominant reticular/linear opacities in the subpleural regions along with honeycomb    pattern in bilateral lower lobes and associated mild traction bronchiectasis/bronchiolectasis    are demonstrated.  Constellation of these findings are consistent with usual interstitial    pneumonia.  No airspace infiltration or consolidations.  No noncalcified lung nodules or mass.       No pleural effusion or thickening.       No cardiomegaly or pericardial effusion.  No coronary calcifications.  Thoracic aorta, proximal    abdominal aorta and proximal segments of the great vessels are normal in caliber.       No lymphadenopathy in the chest or visualized aspects of the abdomen.       Thyroid gland is normal in size.       Esophagus and esophagogastric junction are normal.  Visualized aspects of the upper abdominal    organs are unremarkable.       Visualized subcutaneous soft tissues and musculature are grossly normal.       No suspicious focal osseous lesions.  No displaced fractures or stress fractures.       CONCLUSION:   1.  Lower lobe dominant reticular/linear opacities in the subpleural regions along with    honeycomb pattern in bilateral lower lobes and associated mild traction    bronchiectasis/bronchiolectasis, consistent with usual interstitial pneumonia. 2. No lung mass or noncalcified nodules. 3. No lymphadenopathy. Shoulder X ray left, 4/9/21  Left shoulder 3 views       INDICATIONS: Chronic pain.       No fracture or dislocation       Articular surfaces are normal.   AC joint is unremarkable.       No bony destructive lesions       Soft tissues normal       Adjacent ribs are normal.   Questionable groundglass density is subsegmental atelectasis in the lung base.           Impression   1. Normal left shoulder. 2. Possible left lower lobe subsegmental atelectasis or airspace disease. 3. Correlation with PA and lateral chest x-ray is recommended     4/18/2021 11:31 PM   Pulmonary Function Test:     Indication: antisynthetase syndrome     Never smoked     Test comment:     Spirometry data is acceptable and reproducible.     Maximum effort given during the test.     Pulse ox is 97% on room air     Estimated body mass index is 27.34 kg/m² as calculated from the   following:     Height as of 9/14/20: 5' (1.524 m).     Weight as of an earlier encounter on 4/9/21: 140 lb (63.5 kg).      Spirometry data:     FEV1/FVC: 86. Predicted ratio 79     Pre-Bronchodilator FEV1 1.53L, which is 66% predicted     Post-Bronchodilator FEV1 1.68L, which is 72% predicted     There is 9% reversibility     FVC is 1.78L, which is 60% predicted     Lung Volumes:     TLC (by Plethysmography) is 3.14L, which is 79% predicted     RV is 1.38L which is 81% predicted     Diffusion Capacity:     DLCO is 12.3 which is 54% predicted     Impression:     1. There is no obstruction present     2. There is no significant reversibility with bronchodilator         [Increase in FEV1 => 12% of control and => 200 ml]     3. There is no significant hyperinflation or air trapping     4. There is moderate restriction     5. There is moderate reduction in diffusion capacity     Comment:   Moderate restrictive disease with moderate reduction in diffusion capacity   In comparison with the PFT on 7/10/19 there is significant   decrease in FEV1 from 1.91L to 1.53L; significant decrease in TLC   from 91% to 70%; and significant decrease in DLCO from 71% to 54%     SIX-MINUTE WALK:     A six-minute walk was performed on room air.     The patient walked a total of 800 feet.  She did stop or pause   during the walk for to place O2 at 2 lpm.  Baseline oxygen   saturation 97%.  The lowest oxygen saturation during the walk was   87% on minute 2    PFT 08/06/2021     INDICATION:  ILD.     BMI:  26.9.     TOBACCO HISTORY:  Never smoked. Spirometry data is acceptable and reproducible. Lung volumes performed via plethysmography. Diffusion capacity not adjusted for hemoglobin. Room air oxygen saturation is 94%.     SPIROMETRY:  FEV1 to FVC ratio is 81%. Prebronchodilator FEV1 is 1.8,  which is 78% of predicted. Prebronchodilator FVC is 2.63, which is 75%  of predicted.     Lung volumes showed total lung capacity of 3.88, which is 87% of  predicted. Residual volume is 1.89, which is 111% of predicted.     Diffusion capacity is 16.37, which is 72% of predicted.     IMPRESSION:  1. No obstructive defect. 2.  There is a significant response to bronchodilators in the small  airways. 3.  Normal lung volumes. 4.  Mild decrease in diffusion capacity. 5.  When compared to previous PFT dated 04/09/2021, there is a  significant increase in FVC and FEV1. There is also a significant  improvement in total lung capacity and residual volume. There is also  significant improvement in diffusion capacity.     A 6 minute walk was performed on room air. Baseline oxygen saturation  was 94%. With submaximal exercise, the patient had an oxygen  desaturation to 86%. She was placed on oxygen and at 3 liters  maintained an oxygen saturation of 88% or better.     The patient walked a total of 540 feet. She did not stop or pause  during the walk.   The patient's oxygen saturation was noted to recover  quickly.     Assessment:      Diagnosis Orders   1. ILD (interstitial lung disease) ([address]  Full PFT Study With Bronchodilator    Carbon Monoxide Diffusing Capacity    6 Minute Walk Test       Plan:   54year old female with antisynthetase syndrome with interstitial lung disease. CT chest findings are consistent with UIP  She had rapidly progressive disease. There was significant decline in TLC from 91 to 70% predicted over 15 months, and decline of FEV1 by 350 mL and decline in DLCO from 71 to 53% predicted. However after she was started on Rituxan and IVIG clinically she was improving. F/u PFT also showed significant improvement as FEV1 improved to 78% and DLCO improved to 72%    Thankfully disease progression was halted with significant reversal of lung function.        Plan   PFT and 6MWT in 6 months  Counseled if she gets COVID to have monoclonal Ab ASAP given she is on Rituxan  She should also get pfizer vaccine before the next Rituxan dose

## 2021-08-26 NOTE — PROGRESS NOTES
[address], [place]                                                                                                                         [phone] (x) [phone] (F)    Note is transcribed using voice recognition software. Inadvertent computerized transcription errors may be present. Patient identification: Anise Spurling McWhorter,: ,62 y.o. Sex: female     A/P  Linda was seen today for follow-up and arthritis. 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 A/P from Today's visit-    1. Polymyositis-anti-Deidra syndrome with  ILD- Subjective + Obj improvement. Feels much better   Rituxan 2021, and 2021. IV IG- May 26- ,  July, Aug, at [facility]. Will be getting IV IG for next 2 months. Due for Rituxan in Dec 2021. Currently on- CellCept 3 g a day, 20 mg pred daily,and Bactrim prophylaxis. Plan to continue current medications as above, except reduce prednisone 15 mg a day, if remains doing well, cut down to 10 mg in 1 month time. PFT:  21- PFT-FVC 60% predicted, TLC 79% predicted, DLCO 54% predicted. 21     FVC 75% pred, TLC 80% predicted, DLCO 72% pred    Myositis- stable    Inflammatory arthritis- Symptomatic. 2. Bone health -  2019 lumbar T score -1.8, left femoral neck -1.7. Reclast 2021. Continue calcium vitamin D.         She is aware of myositis emergencies. Patient indicates understanding and agrees with the management plan. I reviewed patient's history, referral documents and electronic medical records. #######################################################################    Subjective-  Follow-up for polymyositis. Interval Sylvester Walker is doing much better, denies any musculoskeletal discomfort other than intercurrent left-sided sciatica. Does not have shortness of breath, cough, exercise capacity is much better. Has been losing weight intentionally. Denies any muscle weakness. No dysphagia, diplopia. Overall-states that she is doing much better. Continues to get IVIG, and had a course of rituximab in April/May. And continues to take all prescribed oral meds. Tolerating medications well. All other review of systems are negative. No family history of autoimmune diseases    Current Outpatient Medications   Medication Sig Dispense Refill    mycophenolate (CELLCEPT) 500 MG tablet TAKE 3 TABLETS BY MOUTH  TWICE DAILY 180 tablet 2    predniSONE (DELTASONE) 20 MG tablet TAKE 1 TABLET BY MOUTH  DAILY 90 tablet 0    sulfamethoxazole-trimethoprim (BACTRIM DS;SEPTRA DS) 800-160 MG per tablet Take 3 times a week- M/W/F 36 tablet 3    levothyroxine (SYNTHROID) 100 MCG tablet TAKE 1 TABLET BY MOUTH  DAILY 90 tablet 3    hydroxychloroquine (PLAQUENIL) 200 MG tablet TAKE ONE TABLET BY MOUTH TWICE A  tablet 1    sulfamethoxazole-trimethoprim (BACTRIM DS;SEPTRA DS) 800-160 MG per tablet Take 1 tab three times a week. M/W/F 36 tablet 1    zoledronic acid (RECLAST) 5 MG/100ML SOLN Infuse 100 mLs intravenously once for 1 dose 100 mL 0    Apremilast 10 & 20 & 30 MG TBPK As per starter pack directions. 1 each 0    Apremilast (OTEZLA) 30 MG TABS Take 30 mg by mouth 2 times daily Start after completing the starter pack.  60 tablet 3    levothyroxine (SYNTHROID) 100 MCG tablet TAKE 1 TABLET DAILY 90 tablet 0    mycophenolate (CELLCEPT) Results   Component Value Date    SEDRATE 63 (H) 06/04/2021     Lab Results   Component Value Date    CRP 7.1 (H) 06/04/2021     Lab Results   Component Value Date    MICHELLE POSITIVE (A) 06/12/2019    SEDRATE 63 (H) 06/04/2021     Lab Results   Component Value Date    CKTOTAL 147 06/04/2021     Lab Results   Component Value Date    TSH 2.40 07/26/2018     Lab Results   Component Value Date    VITD25 22.5 (L) 08/19/2020         Radiology Review:    7/9/19-   IMPRESSION:  Mild restrictive ventilatory defect. Finding may be  consistent with neuromuscular chest wall, pleural, or parenchymal  disorder, including mild obesity.     A/P- See above. Total time 36  minutes that includes the following-  Preparing to see the patient such as reviewing patients records, pre-charting, preparing the visit on the same day, performing a medically appropriate history and physical examination, counseling and educating patient about diagnosis, management plan, ordering appropriate testings, prescriptions, communicating findings to other care providers, and documenting clinical information in electronic medical record.

## 2021-08-27 ENCOUNTER — OFFICE VISIT (OUTPATIENT)
Dept: INTERNAL MEDICINE CLINIC | Age: 56
End: 2021-08-27
Payer: COMMERCIAL

## 2021-08-27 ENCOUNTER — OFFICE VISIT (OUTPATIENT)
Dept: RHEUMATOLOGY | Age: 56
End: 2021-08-27
Payer: COMMERCIAL

## 2021-08-27 ENCOUNTER — HOSPITAL ENCOUNTER (OUTPATIENT)
Dept: MAMMOGRAPHY | Age: 56
Discharge: HOME | End: 2021-08-27
Payer: COMMERCIAL

## 2021-08-27 VITALS
SYSTOLIC BLOOD PRESSURE: 119 MMHG | HEIGHT: 60 IN | DIASTOLIC BLOOD PRESSURE: 70 MMHG | WEIGHT: 135 LBS | BODY MASS INDEX: 26.5 KG/M2 | OXYGEN SATURATION: 97 % | HEART RATE: 90 BPM

## 2021-08-27 VITALS
WEIGHT: 139 LBS | BODY MASS INDEX: 27.29 KG/M2 | DIASTOLIC BLOOD PRESSURE: 68 MMHG | SYSTOLIC BLOOD PRESSURE: 110 MMHG | HEIGHT: 60 IN

## 2021-08-27 VITALS — BODY MASS INDEX: 26.5 KG/M2 | HEIGHT: 60 IN | WEIGHT: 135 LBS

## 2021-08-27 DIAGNOSIS — M81.0 SENILE OSTEOPOROSIS: ICD-10-CM

## 2021-08-27 DIAGNOSIS — M85.80 STEROID-INDUCED OSTEOPENIA: ICD-10-CM

## 2021-08-27 DIAGNOSIS — D89.89 ANTISYNTHETASE SYNDROME (HCC): Primary | ICD-10-CM

## 2021-08-27 DIAGNOSIS — M19.90 INFLAMMATORY ARTHRITIS: ICD-10-CM

## 2021-08-27 DIAGNOSIS — T38.0X5A STEROID-INDUCED OSTEOPENIA: ICD-10-CM

## 2021-08-27 DIAGNOSIS — M81.0 OSTEOPOROSIS WITHOUT CURRENT PATHOLOGICAL FRACTURE, UNSPECIFIED OSTEOPOROSIS TYPE: ICD-10-CM

## 2021-08-27 DIAGNOSIS — J84.9 ILD (INTERSTITIAL LUNG DISEASE) (HCC): ICD-10-CM

## 2021-08-27 DIAGNOSIS — Z79.899 HIGH RISK MEDICATION USE: ICD-10-CM

## 2021-08-27 DIAGNOSIS — Z12.31 ENCOUNTER FOR SCREENING MAMMOGRAM FOR MALIGNANT NEOPLASM OF BREAST: ICD-10-CM

## 2021-08-27 DIAGNOSIS — E03.4 HYPOTHYROIDISM DUE TO ACQUIRED ATROPHY OF THYROID: ICD-10-CM

## 2021-08-27 DIAGNOSIS — Z00.00 ROUTINE GENERAL MEDICAL EXAMINATION AT A HEALTH CARE FACILITY: Primary | ICD-10-CM

## 2021-08-27 PROCEDURE — 99214 OFFICE O/P EST MOD 30 MIN: CPT | Performed by: INTERNAL MEDICINE

## 2021-08-27 PROCEDURE — G8427 DOCREV CUR MEDS BY ELIG CLIN: HCPCS | Performed by: INTERNAL MEDICINE

## 2021-08-27 PROCEDURE — G8427 DOCREV CUR MEDS BY ELIG CLIN: HCPCS | Performed by: NURSE PRACTITIONER

## 2021-08-27 PROCEDURE — 77063 BREAST TOMOSYNTHESIS BI: CPT

## 2021-08-27 PROCEDURE — G8419 CALC BMI OUT NRM PARAM NOF/U: HCPCS | Performed by: INTERNAL MEDICINE

## 2021-08-27 PROCEDURE — G8419 CALC BMI OUT NRM PARAM NOF/U: HCPCS | Performed by: NURSE PRACTITIONER

## 2021-08-27 PROCEDURE — 99214 OFFICE O/P EST MOD 30 MIN: CPT | Performed by: NURSE PRACTITIONER

## 2021-08-27 PROCEDURE — 3017F COLORECTAL CA SCREEN DOC REV: CPT | Performed by: INTERNAL MEDICINE

## 2021-08-27 PROCEDURE — 3017F COLORECTAL CA SCREEN DOC REV: CPT | Performed by: NURSE PRACTITIONER

## 2021-08-27 PROCEDURE — 1036F TOBACCO NON-USER: CPT | Performed by: INTERNAL MEDICINE

## 2021-08-27 PROCEDURE — 1036F TOBACCO NON-USER: CPT | Performed by: NURSE PRACTITIONER

## 2021-08-27 RX ORDER — PREDNISONE 1 MG/1
TABLET ORAL
Qty: 90 TABLET | Refills: 0 | Status: SHIPPED | OUTPATIENT
Start: 2021-08-27 | End: 2022-02-15 | Stop reason: SDUPTHER

## 2021-08-27 SDOH — ECONOMIC STABILITY: TRANSPORTATION INSECURITY
IN THE PAST 12 MONTHS, HAS THE LACK OF TRANSPORTATION KEPT YOU FROM MEDICAL APPOINTMENTS OR FROM GETTING MEDICATIONS?: NO

## 2021-08-27 SDOH — ECONOMIC STABILITY: TRANSPORTATION INSECURITY
IN THE PAST 12 MONTHS, HAS LACK OF TRANSPORTATION KEPT YOU FROM MEETINGS, WORK, OR FROM GETTING THINGS NEEDED FOR DAILY LIVING?: NO

## 2021-08-27 SDOH — ECONOMIC STABILITY: FOOD INSECURITY: WITHIN THE PAST 12 MONTHS, YOU WORRIED THAT YOUR FOOD WOULD RUN OUT BEFORE YOU GOT MONEY TO BUY MORE.: NEVER TRUE

## 2021-08-27 SDOH — ECONOMIC STABILITY: FOOD INSECURITY: WITHIN THE PAST 12 MONTHS, THE FOOD YOU BOUGHT JUST DIDN'T LAST AND YOU DIDN'T HAVE MONEY TO GET MORE.: NEVER TRUE

## 2021-08-27 ASSESSMENT — ENCOUNTER SYMPTOMS
SHORTNESS OF BREATH: 0
ABDOMINAL PAIN: 0
COLOR CHANGE: 0
COUGH: 0
CONSTIPATION: 0
SINUS PAIN: 0
DIARRHEA: 0
WHEEZING: 0
SINUS PRESSURE: 0
BACK PAIN: 0

## 2021-08-27 ASSESSMENT — SOCIAL DETERMINANTS OF HEALTH (SDOH): HOW HARD IS IT FOR YOU TO PAY FOR THE VERY BASICS LIKE FOOD, HOUSING, MEDICAL CARE, AND HEATING?: NOT HARD AT ALL

## 2021-08-27 NOTE — PROGRESS NOTES
PF, 0.5 mL 04/01/2021    Hepatitis B Adult (Engerix-B) 06/27/2019, 07/24/2019, 08/30/2019    Influenza Virus Vaccine 10/22/2019    Influenza, Zaidi Jacks, IM, PF (6 mo and older Fluzone, Flulaval, Fluarix, and 3 yrs and older Afluria) 10/13/2016, 10/18/2018, 11/06/2020    Influenza, Zaidi Jacks, Recombinant, IM PF (Flublok 18 yrs and older) 10/25/2019    Pneumococcal Polysaccharide (Cctoryzuh25) 10/23/2015    Tdap (Boostrix, Adacel) 09/12/2014    Zoster Recombinant (Shingrix) 11/20/2019, 02/19/2020       Health Maintenance   Topic Date Due    Cervical cancer screen  09/24/2013    Pneumococcal 0-64 years Vaccine (2 of 4 - PCV13) 10/23/2016    Flu vaccine (1) 09/01/2021    Breast cancer screen  08/27/2023    DTaP/Tdap/Td vaccine (2 - Td or Tdap) 09/12/2024    Lipid screen  08/19/2025    Colon cancer screen colonoscopy  08/29/2026    Shingles Vaccine  Completed    COVID-19 Vaccine  Completed    Hepatitis C screen  Completed    HIV screen  Completed    Hepatitis A vaccine  Aged Out    Hepatitis B vaccine  Aged Out    Hib vaccine  Aged Out    Meningococcal (ACWY) vaccine  Aged Out       Discussed over the counter medication with patient. Discussed use, benefit, and side effects of prescribed medications. Barriers to medication compliance addressed. Allpatient questions answered. Pt voiced understanding. Medications reviewed and patient understands. Questions answered    Review of Systems   Constitutional: Negative for chills, fatigue and fever. HENT: Negative for congestion, sinus pressure and sinus pain. Respiratory: Negative for cough, shortness of breath and wheezing. Cardiovascular: Negative for chest pain and palpitations. Gastrointestinal: Negative for abdominal pain, constipation and diarrhea. Musculoskeletal: Negative for arthralgias, back pain and myalgias. Skin: Negative for color change, pallor and rash.    Neurological: Negative for dizziness, syncope, weakness, light-headedness and headaches. Psychiatric/Behavioral: Negative for behavioral problems, confusion and sleep disturbance. The patient is not nervous/anxious. Objective:   Physical Exam  Constitutional:       Appearance: She is well-developed. HENT:      Head: Normocephalic and atraumatic. Right Ear: Tympanic membrane normal.      Left Ear: Tympanic membrane normal.      Mouth/Throat:      Mouth: Mucous membranes are moist.      Pharynx: Oropharynx is clear. Eyes:      Conjunctiva/sclera: Conjunctivae normal.      Pupils: Pupils are equal, round, and reactive to light. Neck:      Thyroid: No thyromegaly. Vascular: No JVD. Cardiovascular:      Rate and Rhythm: Normal rate and regular rhythm. Heart sounds: Normal heart sounds. Pulmonary:      Effort: Pulmonary effort is normal. No respiratory distress. Breath sounds: Normal breath sounds. No wheezing. Abdominal:      General: Bowel sounds are normal. There is no distension. Palpations: Abdomen is soft. Tenderness: There is no abdominal tenderness. Musculoskeletal:         General: No deformity. Normal range of motion. Cervical back: Normal range of motion and neck supple. Skin:     General: Skin is warm and dry. Capillary Refill: Capillary refill takes less than 2 seconds. Neurological:      Mental Status: She is alert and oriented to person, place, and time.    Psychiatric:         Behavior: Behavior normal.         Assessment:      See ProblemList assessment and plan       Plan:      Routine general medical examination at a health care facility  Discussed vaccines   Encouraged healthy diet and exercise   Check labs  Reviewed colonoscopy  Encouraged to schedule mammogram    Hypothyroidism due to acquired atrophy of thyroid  Stable, controlled  Check TSH    Osteoporosis  Stable, controlled  Doing well with Reclast  Reviewed DEXA    Inflammatory arthritis  Stable, controlled  Following with  Audrey    ILD (interstitial lung disease) [facility])  Following with pulmonology  Recent PFTs reviewed  Breathing stable         Patient engaged in shared decision making. Information given to evaluate options of treatment, understand what is needed and discuss importance of following plan.

## 2021-08-27 NOTE — ASSESSMENT & PLAN NOTE
Discussed vaccines   Encouraged healthy diet and exercise   Check labs  Reviewed colonoscopy  Encouraged to schedule mammogram

## 2021-09-08 ENCOUNTER — TELEPHONE (OUTPATIENT)
Dept: INTERNAL MEDICINE CLINIC | Age: 56
End: 2021-09-08

## 2021-09-08 NOTE — TELEPHONE ENCOUNTER
----- Message from Jaja Crick sent at 9/8/2021 11:22 AM EDT -----  Subject: Message to Provider    QUESTIONS  Information for Provider? Patient had some questions on pneumonia shot she   is supposed to get. She did get one when she was 48 and she wants to   confirm that she needs to get it done again. she wants to come today to   get that done if she needs to. She also wants to confirm that the paper   work from her physical was sent to the company who manages the discount on   her insurance. can sent by scan and email to [email or web address]   Please reach out to patient to let her know as soon as possible.   ---------------------------------------------------------------------------  --------------  CALL BACK INFO  What is the best way for the office to contact you? OK to leave message on   voicemail  Preferred Call Back Phone Number? [phone]  ---------------------------------------------------------------------------  --------------  SCRIPT ANSWERS  Relationship to Patient?  Self

## 2021-09-21 ENCOUNTER — TELEPHONE (OUTPATIENT)
Dept: INTERNAL MEDICINE CLINIC | Age: 56
End: 2021-09-21

## 2021-09-21 NOTE — TELEPHONE ENCOUNTER
----- Message from [place] sent at 9/21/2021  2:04 PM EDT -----  Subject: Message to Provider    QUESTIONS  Information for Provider? Patient is asking can this office fax over a   copy of her yearly physical to [employer] works for you (form for her   insurance). Paper is due by 9/30/2021 Please email a copy to patient   [email or web address] or [email or web address]  ---------------------------------------------------------------------------  --------------  CALL BACK INFO  What is the best way for the office to contact you? OK to leave message on   voicemail  Preferred Call Back Phone Number? [phone]  ---------------------------------------------------------------------------  --------------  SCRIPT ANSWERS  Relationship to Patient?  Self

## 2021-09-26 PROBLEM — Z00.00 ROUTINE GENERAL MEDICAL EXAMINATION AT A HEALTH CARE FACILITY: Status: RESOLVED | Noted: 2019-08-30 | Resolved: 2021-09-26

## 2021-10-26 ENCOUNTER — OFFICE VISIT (OUTPATIENT)
Dept: RHEUMATOLOGY | Age: 56
End: 2021-10-26
Payer: COMMERCIAL

## 2021-10-26 VITALS
WEIGHT: 135 LBS | BODY MASS INDEX: 26.5 KG/M2 | SYSTOLIC BLOOD PRESSURE: 118 MMHG | DIASTOLIC BLOOD PRESSURE: 76 MMHG | HEIGHT: 60 IN

## 2021-10-26 DIAGNOSIS — Z79.899 HIGH RISK MEDICATION USE: ICD-10-CM

## 2021-10-26 DIAGNOSIS — J84.9 ILD (INTERSTITIAL LUNG DISEASE) (HCC): Primary | ICD-10-CM

## 2021-10-26 DIAGNOSIS — M19.90 INFLAMMATORY ARTHRITIS: ICD-10-CM

## 2021-10-26 DIAGNOSIS — D89.89 ANTISYNTHETASE SYNDROME (HCC): ICD-10-CM

## 2021-10-26 LAB
A/G RATIO: 1 (ref 1.1–2.2)
ALBUMIN SERPL-MCNC: 4 G/DL (ref 3.4–5)
ALP BLD-CCNC: 63 U/L (ref 40–129)
ALT SERPL-CCNC: 17 U/L (ref 10–40)
ANION GAP SERPL CALCULATED.3IONS-SCNC: 14 MMOL/L (ref 3–16)
AST SERPL-CCNC: 19 U/L (ref 15–37)
BASOPHILS ABSOLUTE: 0 K/UL (ref 0–0.2)
BASOPHILS RELATIVE PERCENT: 0.4 %
BILIRUB SERPL-MCNC: 0.3 MG/DL (ref 0–1)
BILIRUBIN URINE: NEGATIVE
BLOOD, URINE: NEGATIVE
BUN BLDV-MCNC: 12 MG/DL (ref 7–20)
C-REACTIVE PROTEIN: <3 MG/L (ref 0–5.1)
CALCIUM SERPL-MCNC: 8.7 MG/DL (ref 8.3–10.6)
CHLORIDE BLD-SCNC: 102 MMOL/L (ref 99–110)
CLARITY: CLEAR
CO2: 23 MMOL/L (ref 21–32)
COLOR: YELLOW
CREAT SERPL-MCNC: 0.7 MG/DL (ref 0.6–1.1)
EOSINOPHILS ABSOLUTE: 0 K/UL (ref 0–0.6)
EOSINOPHILS RELATIVE PERCENT: 0.1 %
GFR AFRICAN AMERICAN: >60
GFR NON-AFRICAN AMERICAN: >60
GLOBULIN: 3.9 G/DL
GLUCOSE BLD-MCNC: 82 MG/DL (ref 70–99)
GLUCOSE URINE: NEGATIVE MG/DL
HCT VFR BLD CALC: 37.3 % (ref 36–48)
HEMOGLOBIN: 12 G/DL (ref 12–16)
IGA: 143 MG/DL (ref 70–400)
IGG: 2465 MG/DL (ref 700–1600)
IGM: 99 MG/DL (ref 40–230)
KETONES, URINE: NEGATIVE MG/DL
LEUKOCYTE ESTERASE, URINE: NEGATIVE
LYMPHOCYTES ABSOLUTE: 1.2 K/UL (ref 1–5.1)
LYMPHOCYTES RELATIVE PERCENT: 19.3 %
MCH RBC QN AUTO: 30.8 PG (ref 26–34)
MCHC RBC AUTO-ENTMCNC: 32.1 G/DL (ref 31–36)
MCV RBC AUTO: 96.1 FL (ref 80–100)
MICROSCOPIC EXAMINATION: NORMAL
MONOCYTES ABSOLUTE: 0.4 K/UL (ref 0–1.3)
MONOCYTES RELATIVE PERCENT: 6.7 %
NEUTROPHILS ABSOLUTE: 4.6 K/UL (ref 1.7–7.7)
NEUTROPHILS RELATIVE PERCENT: 73.5 %
NITRITE, URINE: NEGATIVE
PDW BLD-RTO: 15.3 % (ref 12.4–15.4)
PH UA: 6 (ref 5–8)
PLATELET # BLD: 332 K/UL (ref 135–450)
PMV BLD AUTO: 8.5 FL (ref 5–10.5)
POTASSIUM SERPL-SCNC: 3.9 MMOL/L (ref 3.5–5.1)
PROTEIN UA: NEGATIVE MG/DL
RBC # BLD: 3.88 M/UL (ref 4–5.2)
SEDIMENTATION RATE, ERYTHROCYTE: 69 MM/HR (ref 0–30)
SODIUM BLD-SCNC: 139 MMOL/L (ref 136–145)
SPECIFIC GRAVITY UA: 1.02 (ref 1–1.03)
TOTAL CK: 31 U/L (ref 26–192)
TOTAL PROTEIN: 7.9 G/DL (ref 6.4–8.2)
URINE TYPE: NORMAL
UROBILINOGEN, URINE: 1 E.U./DL
WBC # BLD: 6.3 K/UL (ref 4–11)

## 2021-10-26 PROCEDURE — 36415 COLL VENOUS BLD VENIPUNCTURE: CPT | Performed by: INTERNAL MEDICINE

## 2021-10-26 PROCEDURE — 1036F TOBACCO NON-USER: CPT | Performed by: INTERNAL MEDICINE

## 2021-10-26 PROCEDURE — G8484 FLU IMMUNIZE NO ADMIN: HCPCS | Performed by: INTERNAL MEDICINE

## 2021-10-26 PROCEDURE — G8427 DOCREV CUR MEDS BY ELIG CLIN: HCPCS | Performed by: INTERNAL MEDICINE

## 2021-10-26 PROCEDURE — G8419 CALC BMI OUT NRM PARAM NOF/U: HCPCS | Performed by: INTERNAL MEDICINE

## 2021-10-26 PROCEDURE — 3017F COLORECTAL CA SCREEN DOC REV: CPT | Performed by: INTERNAL MEDICINE

## 2021-10-26 PROCEDURE — 99215 OFFICE O/P EST HI 40 MIN: CPT | Performed by: INTERNAL MEDICINE

## 2021-10-26 PROCEDURE — 81003 URINALYSIS AUTO W/O SCOPE: CPT | Performed by: INTERNAL MEDICINE

## 2021-10-26 NOTE — PROGRESS NOTES
[address], [place]                                                                                                                         [phone] (C) [phone] (F)    Note is transcribed using voice recognition software. Inadvertent computerized transcription errors may be present. Patient identification: Josey Hays,: ,88 y.o. Sex: female     A/P  Linda was seen today for follow-up. 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 A/P from Today's visit-    1. Polymyositis-anti-Deidra syndrome with  ILD- Subjective + Obj improvement. Doing well, other than noticing some redness and itchiness in her scalp. Continues to have exertional shortness of breath with a flight of stairs. Overall feels stable. Redose rituximab in mid November, orders are placed. Continue IVIG, 6 infusions were approved, until 2021. Reduce prednisone 7.5 mg daily. Continue CellCept 3 g a day along with PCP prophylaxis. Will reassess next visit in couple months to decide the need of further IVIG. Previous therapy-   Rituxan 2021, and 2021. IV IG- May 26- ,  July, Aug, at [facility] IV center.  Will be getting IV IG for next 2 months. PFT:  4/9/21- PFT-FVC 60% predicted, TLC 79% predicted, DLCO 54% predicted. 8/6/21     FVC 75% pred, TLC 80% predicted, DLCO 72% pred    2. Myositis-in remission-normal CK    3. Inflammatory arthritis-asymptomatic. 4.  Left trochanteric bursitis-exercises discussed. 5 bone health -  9/20/2019 lumbar T score -1.8, left femoral neck -1.7. Reclast 6/22/2021. Continue calcium vitamin D. She is aware of myositis emergencies. Patient indicates understanding and agrees with the management plan. I reviewed patient's history, referral documents and electronic medical records. Total time> 40 minutes history taking, exam, review treatment plan, medical documentation. #######################################################################    Subjective-  Follow-up for polymyositis. Interval changes-overall, remained stable. Denies any new complaints or concerns other than itchy scalp. Denies any muscle weakness, joint pain, swelling, worsening shortness of breath or cough. No dysphagia, diplopia. Breathing has improved on IVIG and rituximab, PFTs have improved as well. Tolerating medications well. Normal ADLs. Recreational activities are limited because of shortness of breath with exertion. Tolerating medications well. All other review of systems are negative. No family history of autoimmune diseases    Current Outpatient Medications   Medication Sig Dispense Refill    predniSONE (DELTASONE) 5 MG tablet Take 1 tab po daily.  90 tablet 0    predniSONE (DELTASONE) 20 MG tablet TAKE 1 TABLET BY MOUTH  DAILY 90 tablet 0    sulfamethoxazole-trimethoprim (BACTRIM DS;SEPTRA DS) 800-160 MG per tablet Take 3 times a week- M/W/F 36 tablet 3    levothyroxine (SYNTHROID) 100 MCG tablet TAKE 1 TABLET BY MOUTH  DAILY 90 tablet 3    mycophenolate (CELLCEPT) 500 MG tablet Take 3 tablets by mouth 2 times daily 180 tablet 2    predniSONE (DELTASONE) 5 MG tablet Take 2 tab po daily x 15 days, take1.5 mg po daily x 15 days then take 1 tab po daily. 120 tablet 1    Lactobacillus (PROBIOTIC ACIDOPHILUS) CAPS Take one capsule once daily 30 capsule 5    clobetasol (TEMOVATE) 0.05 % ointment Apply to affected area twice daily on body and hands 60 g 2    triamcinolone (KENALOG) 0.025 % cream Apply to affected area on face twice daily 80 g 2    ibuprofen (ADVIL;MOTRIN) 600 MG tablet Take 1 tablet by mouth every 6 hours as needed for Pain 60 tablet 1    ALBUTEROL SULFATE IN Inhale into the lungs as needed      zoledronic acid (RECLAST) 5 MG/100ML SOLN Infuse 100 mLs intravenously once for 1 dose 100 mL 0     No current facility-administered medications for this visit. Allergies   Allergen Reactions    Wasp Venom Other (See Comments)       PHYSICAL EXAM:    Vitals:    /76   Ht 5' (1.524 m)   Wt 135 lb (61.2 kg)   LMP 09/12/2013   BMI 26.37 kg/m²   General appearance/ Psychiatric: well nourished, and well groomed, normal judgement, alert, appears stated age and cooperative. MKS: Normal musculoskeletal examination upper, lower extremities and spine other than localized tenderness at left trochanteric bursa area. Chest-normal effort clear to auscultation, S1-S2 regular no added sounds. No rashes, periungual changes resolved.   DATA:   Lab Results   Component Value Date    WBC 6.0 08/27/2021    HGB 12.2 08/27/2021    HCT 35.8 (L) 08/27/2021    MCV 92.0 08/27/2021     08/27/2021         Chemistry        Component Value Date/Time     08/27/2021 1215    K 3.7 08/27/2021 1215     08/27/2021 1215    CO2 26 08/27/2021 1215    BUN 11 08/27/2021 1215    CREATININE 0.7 08/27/2021 1215        Component Value Date/Time    CALCIUM 9.0 08/27/2021 1215    ALKPHOS 59 08/27/2021 1215    AST 14 (L) 08/27/2021 1215    ALT 11 08/27/2021 1215    BILITOT 0.4 08/27/2021 1215          No results found for: [facility]  Lab Results   Component Value Date    SEDRATE 66 (H) 08/27/2021     Lab Results   Component Value Date    CRP <3.0 08/27/2021     Lab Results   Component Value Date    MICHELLE POSITIVE (A) 06/12/2019    SEDRATE 66 (H) 08/27/2021     Lab Results   Component Value Date    CKTOTAL 147 06/04/2021     Lab Results   Component Value Date    TSH 2.40 07/26/2018     Lab Results   Component Value Date    VITD25 22.5 (L) 08/19/2020         Radiology Review:    7/9/19-   IMPRESSION:  Mild restrictive ventilatory defect. Finding may be  consistent with neuromuscular chest wall, pleural, or parenchymal  disorder, including mild obesity.     A/P- See above. Total time >40 minutes that includes the following-  Preparing to see the patient such as reviewing patients records, pre-charting, preparing the visit on the same day, performing a medically appropriate history and physical examination, counseling and educating patient about diagnosis, management plan, ordering appropriate testings, prescriptions, communicating findings to other care providers, and documenting clinical information in electronic medical record.

## 2021-10-28 LAB — ALDOLASE: 2.9 U/L (ref 1.5–8.1)

## 2021-12-29 DIAGNOSIS — Z79.899 HIGH RISK MEDICATION USE: Primary | ICD-10-CM

## 2021-12-29 LAB
A/G RATIO: 1.4 (ref 1.1–2.2)
ALBUMIN SERPL-MCNC: 4.2 G/DL (ref 3.4–5)
ALP BLD-CCNC: 58 U/L (ref 40–129)
ALT SERPL-CCNC: 7 U/L (ref 10–40)
ANION GAP SERPL CALCULATED.3IONS-SCNC: 13 MMOL/L (ref 3–16)
AST SERPL-CCNC: 11 U/L (ref 15–37)
BASOPHILS ABSOLUTE: 0 K/UL (ref 0–0.2)
BASOPHILS RELATIVE PERCENT: 0.5 %
BILIRUB SERPL-MCNC: <0.2 MG/DL (ref 0–1)
BUN BLDV-MCNC: 13 MG/DL (ref 7–20)
C-REACTIVE PROTEIN: <3 MG/L (ref 0–5.1)
CALCIUM SERPL-MCNC: 9 MG/DL (ref 8.3–10.6)
CHLORIDE BLD-SCNC: 103 MMOL/L (ref 99–110)
CO2: 24 MMOL/L (ref 21–32)
CREAT SERPL-MCNC: 0.7 MG/DL (ref 0.6–1.1)
EOSINOPHILS ABSOLUTE: 0.1 K/UL (ref 0–0.6)
EOSINOPHILS RELATIVE PERCENT: 1.5 %
GFR AFRICAN AMERICAN: >60
GFR NON-AFRICAN AMERICAN: >60
GLUCOSE BLD-MCNC: 73 MG/DL (ref 70–99)
HCT VFR BLD CALC: 42.4 % (ref 36–48)
HEMOGLOBIN: 13.4 G/DL (ref 12–16)
IGA: 126 MG/DL (ref 70–400)
IGG: 1245 MG/DL (ref 700–1600)
IGM: 81 MG/DL (ref 40–230)
LYMPHOCYTES ABSOLUTE: 1.5 K/UL (ref 1–5.1)
LYMPHOCYTES RELATIVE PERCENT: 27.2 %
MCH RBC QN AUTO: 29.6 PG (ref 26–34)
MCHC RBC AUTO-ENTMCNC: 31.6 G/DL (ref 31–36)
MCV RBC AUTO: 93.6 FL (ref 80–100)
MONOCYTES ABSOLUTE: 0.5 K/UL (ref 0–1.3)
MONOCYTES RELATIVE PERCENT: 9.2 %
NEUTROPHILS ABSOLUTE: 3.3 K/UL (ref 1.7–7.7)
NEUTROPHILS RELATIVE PERCENT: 61.6 %
PDW BLD-RTO: 13.3 % (ref 12.4–15.4)
PLATELET # BLD: 312 K/UL (ref 135–450)
PMV BLD AUTO: 8.4 FL (ref 5–10.5)
POTASSIUM SERPL-SCNC: 3.8 MMOL/L (ref 3.5–5.1)
RBC # BLD: 4.53 M/UL (ref 4–5.2)
SEDIMENTATION RATE, ERYTHROCYTE: 14 MM/HR (ref 0–30)
SODIUM BLD-SCNC: 140 MMOL/L (ref 136–145)
TOTAL PROTEIN: 7.2 G/DL (ref 6.4–8.2)
WBC # BLD: 5.4 K/UL (ref 4–11)

## 2022-01-04 ENCOUNTER — OFFICE VISIT (OUTPATIENT)
Dept: RHEUMATOLOGY | Age: 57
End: 2022-01-04
Payer: COMMERCIAL

## 2022-01-04 VITALS
SYSTOLIC BLOOD PRESSURE: 122 MMHG | HEIGHT: 60 IN | WEIGHT: 135 LBS | BODY MASS INDEX: 26.5 KG/M2 | DIASTOLIC BLOOD PRESSURE: 76 MMHG

## 2022-01-04 DIAGNOSIS — M19.90 INFLAMMATORY ARTHRITIS: ICD-10-CM

## 2022-01-04 DIAGNOSIS — Z79.899 HIGH RISK MEDICATION USE: ICD-10-CM

## 2022-01-04 DIAGNOSIS — D89.89 ANTISYNTHETASE SYNDROME (HCC): Primary | ICD-10-CM

## 2022-01-04 DIAGNOSIS — J84.9 ILD (INTERSTITIAL LUNG DISEASE) (HCC): ICD-10-CM

## 2022-01-04 PROCEDURE — G8419 CALC BMI OUT NRM PARAM NOF/U: HCPCS | Performed by: INTERNAL MEDICINE

## 2022-01-04 PROCEDURE — 3017F COLORECTAL CA SCREEN DOC REV: CPT | Performed by: INTERNAL MEDICINE

## 2022-01-04 PROCEDURE — G8484 FLU IMMUNIZE NO ADMIN: HCPCS | Performed by: INTERNAL MEDICINE

## 2022-01-04 PROCEDURE — G8427 DOCREV CUR MEDS BY ELIG CLIN: HCPCS | Performed by: INTERNAL MEDICINE

## 2022-01-04 PROCEDURE — 1036F TOBACCO NON-USER: CPT | Performed by: INTERNAL MEDICINE

## 2022-01-04 PROCEDURE — 99214 OFFICE O/P EST MOD 30 MIN: CPT | Performed by: INTERNAL MEDICINE

## 2022-01-04 RX ORDER — ERGOCALCIFEROL 1.25 MG/1
50000 CAPSULE ORAL WEEKLY
Qty: 12 CAPSULE | Refills: 1 | Status: SHIPPED | OUTPATIENT
Start: 2022-01-04 | End: 2022-03-14

## 2022-01-04 NOTE — PROGRESS NOTES
[address], [place]                                                                                                                         [phone] (A) [phone] (F)    Note is transcribed using voice recognition software. Inadvertent computerized transcription errors may be present. Patient identification: Cole Hays,: ,39 y.o. Sex: female     A/P  Linda was seen today for follow-up. 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 A/P from Today's visit-    1. Polymyositis-anti-Deidra syndrome with  ILD- Subjective + Obj improvement. Exercise tolerance is good, is able to do 1 hour of aerobic exercises daily without desaturation. Denies exertional shortness of breath while going up and down the stairs. No skin changes. Nail changes have resolved. Currently on 10 mg of prednisone, CellCept 1.5 g twice daily and Bactrim PCP prophylaxis. Also received second cycle of rituximab in 2021 and completed 6 months of IV Ig.    Myositis is in remission normal CK aldolase. Inflammatory arthritis asymptomatic. Previous therapy-   Rituxan 2021, and 2021. 2021, Dec   IV IG- May 26- 28,  July, Aug, sept, Oct,  2021 at [facility] IV center. PFT:  21- PFT-FVC 60% predicted, TLC 79% predicted, DLCO 54% predicted.   21     FVC 75% pred, TLC 80% predicted, DLCO 72% pred      Plan-  Reduce prednisone 7.5 mg daily, continue current dose of CellCept 1.5 g twice daily and PCP prophylaxis. Will redose rituximab in June 2022. Await PFT, clinically feels much better. Bone health -  9/20/2019 lumbar T score -1.8, left femoral neck -1.7. Reclast 6/22/2021. Add vitamin D 50,000 units  Weekly. Continue calcium supplementation. She is aware of myositis emergencies. RTC 2 months. Patient indicates understanding and agrees with the management plan. I reviewed patient's history, referral documents and electronic medical records. Total time> 40 minutes history taking, exam, review treatment plan, medical documentation. #######################################################################    Subjective-  Follow-up for polymyositis. Interval changes-states that she is doing very well on current regimen. Has been exercising 1 hours of aerobic exercises daily, no shortness of breath or cough. She does not report any exertional shortness of breath with stairs. No joint pain, muscle pain/weakness or skin rashes. Even her scalp erythema cleared. Overall feels well. Does not really have any complaints or concerns today other than questions about medication change and lab work. Normal ADLs and recreational activities. Tolerating medications well. All other review of systems are negative. No family history of autoimmune diseases    Current Outpatient Medications   Medication Sig Dispense Refill    mycophenolate (CELLCEPT) 500 MG tablet TAKE 3 TABLETS BY MOUTH  TWICE DAILY 540 tablet 0    predniSONE (DELTASONE) 5 MG tablet Take 1 tab po daily.  90 tablet 0    predniSONE (DELTASONE) 20 MG tablet TAKE 1 TABLET BY MOUTH  DAILY 90 tablet 0    sulfamethoxazole-trimethoprim (BACTRIM DS;SEPTRA DS) 800-160 MG per tablet Take 3 times a week- M/W/F 36 tablet 3    levothyroxine (SYNTHROID) 100 MCG tablet TAKE 1 TABLET BY MOUTH  DAILY 90 tablet 3    predniSONE (DELTASONE) 5 MG tablet Take 2 tab po daily x 15 days, take1.5 mg po daily x 15 days then take 1 tab po daily. 120 tablet 1    Lactobacillus (PROBIOTIC ACIDOPHILUS) CAPS Take one capsule once daily 30 capsule 5    clobetasol (TEMOVATE) 0.05 % ointment Apply to affected area twice daily on body and hands 60 g 2    triamcinolone (KENALOG) 0.025 % cream Apply to affected area on face twice daily 80 g 2    ibuprofen (ADVIL;MOTRIN) 600 MG tablet Take 1 tablet by mouth every 6 hours as needed for Pain 60 tablet 1    ALBUTEROL SULFATE IN Inhale into the lungs as needed      zoledronic acid (RECLAST) 5 MG/100ML SOLN Infuse 100 mLs intravenously once for 1 dose 100 mL 0     No current facility-administered medications for this visit. Allergies   Allergen Reactions    Wasp Venom Other (See Comments)       PHYSICAL EXAM:    Vitals:    /76   Ht 5' (1.524 m)   Wt 135 lb (61.2 kg)   LMP 09/12/2013   BMI 26.37 kg/m²   General appearance/ Psychiatric: well nourished, and well groomed, normal judgement, alert, appears stated age and cooperative. MKS: Normal musculoskeletal examination in upper, lower extremities and spine without any Tender swollen inflamed joints or muscle weakness. Neck flexors are normal.She is able to get up from sitting position without any difficulty, able to squat and get up from squatting position without any difficulties. Chest-normal effort clear to auscultation, S1-S2 regular no added sounds. No rashes, periungual changes resolved. Scalp erythema resolved.     DATA:   Lab Results   Component Value Date    WBC 5.4 12/29/2021    HGB 13.4 12/29/2021    HCT 42.4 12/29/2021    MCV 93.6 12/29/2021     12/29/2021         Chemistry        Component Value Date/Time     12/29/2021 1009    K 3.8 12/29/2021 1009     12/29/2021 1009    CO2 24 12/29/2021 1009    BUN 13 12/29/2021 1009    CREATININE 0.7 12/29/2021 1009        Component Value Date/Time    CALCIUM 9.0 12/29/2021 1009    ALKPHOS 58 12/29/2021 1009 AST 11 (L) 12/29/2021 1009    ALT 7 (L) 12/29/2021 1009    BILITOT <0.2 12/29/2021 1009          No results found for: [facility]  Lab Results   Component Value Date    SEDRATE 14 12/29/2021     Lab Results   Component Value Date    CRP <3.0 12/29/2021     Lab Results   Component Value Date    MICHELLE POSITIVE (A) 06/12/2019    SEDRATE 14 12/29/2021     Lab Results   Component Value Date    CKTOTAL 31 10/26/2021     Lab Results   Component Value Date    TSH 2.40 07/26/2018     Lab Results   Component Value Date    VITD25 22.5 (L) 08/19/2020         Radiology Review:     CT chest 5/2021-lower lobe predominant reticular linear opacities with honeycombing and traction bronchiectasis consistent with UIP. PFT from 8/6/2021-no obstructive defect. Significant response to bronchodilators in the small airways. Normal lung volumes. Mild decline in DLCO. Compared to previous PFTs 4/9/2021, there is significant increase in FVC and FEV1  6-minute walk-baseline 94%, with walking desaturations to 86%. A/P- See above. Total time 35 minutes that includes the following-  Preparing to see the patient such as reviewing patients records, pre-charting, preparing the visit on the same day, performing a medically appropriate history and physical examination, counseling and educating patient about diagnosis, management plan, ordering appropriate testings, prescriptions, communicating findings to other care providers, and documenting clinical information in electronic medical record.

## 2022-02-02 ENCOUNTER — HOSPITAL ENCOUNTER (OUTPATIENT)
Dept: PULMONOLOGY | Age: 57
Discharge: HOME | End: 2022-02-02
Payer: COMMERCIAL

## 2022-02-02 DIAGNOSIS — J84.9 ILD (INTERSTITIAL LUNG DISEASE) (HCC): ICD-10-CM

## 2022-02-02 PROCEDURE — 94726 PLETHYSMOGRAPHY LUNG VOLUMES: CPT

## 2022-02-02 PROCEDURE — 94729 DIFFUSING CAPACITY: CPT

## 2022-02-02 PROCEDURE — 94618 PULMONARY STRESS TESTING: CPT

## 2022-02-02 PROCEDURE — 94760 N-INVAS EAR/PLS OXIMETRY 1: CPT

## 2022-02-02 PROCEDURE — 94060 EVALUATION OF WHEEZING: CPT

## 2022-02-02 PROCEDURE — 94664 DEMO&/EVAL PT USE INHALER: CPT

## 2022-02-02 PROCEDURE — 6360000002 HC RX W HCPCS: Performed by: INTERNAL MEDICINE

## 2022-02-02 RX ORDER — ALBUTEROL SULFATE 2.5 MG/3ML
2.5 SOLUTION RESPIRATORY (INHALATION) ONCE
Status: COMPLETED | OUTPATIENT
Start: 2022-02-02 | End: 2022-02-02

## 2022-02-02 RX ADMIN — ALBUTEROL SULFATE 2.5 MG: 2.5 SOLUTION RESPIRATORY (INHALATION) at 17:37

## 2022-02-02 NOTE — PROGRESS NOTES
Six Minute Walk     [x]  Desaturation Screening [x]  Endurance Test       Name:  Beth Rosario  Medical Record Number:  [id number]  Age: 64 y.o. Gender: female  : 1965  Today's Date:  2022  Pulmonary History:ILD  Home Oxygen Therapy:  room air  Home Respiratory Therapy:None                    6 MINUTE WALK DATA    Modality Time (Minutes) SPO2 RR B/P Heart Rate O2 SOB Pain Level   Rest 2 96 12 111/71 76 R/A 0 0   Walk 1 95   79 R/A 0 0   Walk 2 95   93 R/A 0 0   Walk 3 94   94 R/A 0 0   Walk 4 90   92 R/A 0 0   Walk 5 93   96 R/A 0 0   Walk 6 95   93 R/A 0 0   Recovery 2 95 14 139/80 82 R/A 0 0     Nazia SOB Scale:  0=Nothing at all; 0.5=Very, very slight; 1=Very slight; 2=Slight; 3=Moderate; 4=Somewhat severe; 5=Severe; 7=Very Severe; 10=Very, very Severe    Exercise Summary:  Distance Walked (feet): 1080  Lowest SpO2 During Walk:  90  (FIO2)  R/A  Walking Pace (Steps per Minute)  90  Stopped or Paused during Walk:  no    Comments / Reason:  Pox did not fall below 90% during walk, Pt did not state she was Short of Breath or in pain during walk.      Electronically signed by Leo Garcia RCP on 2022 at 6:34 PM

## 2022-02-04 NOTE — PROCEDURES
[address]               [address]                               PULMONARY FUNCTION    PATIENT NAME: Dasha Trinidad                   :        1965  MED REC NO:   [id number]                          ROOM:  ACCOUNT NO:   [de-identified]                           ADMIT DATE: 2022  PROVIDER:     Sabra Prado MD    DATE OF PROCEDURE:  2022    FINDINGS:  Forced vital capacity, expiratory flow rates and FEV1 to FVC  ratio normal.  No change after bronchodilator. Lung volume  determinations by plethysmography show normal total lung capacity, RV,  FRC. Single-breath diffusion capacity mildly reduced, but normal when  compared to alveolar volume. IMPRESSION:  Normal pulmonary function study. In comparison to previous  study of 2021, vital capacity has increased to 31%, FRC increased  13%, diffusion capacity increased 25%.         Declan Rowland MD    D: 2022 14:51:52       T: 2022 14:54:07     [email or web address]  Job#: [id number]     Doc#: [id number]    CC:

## 2022-02-15 RX ORDER — PREDNISONE 1 MG/1
TABLET ORAL
Qty: 90 TABLET | Refills: 0 | Status: SHIPPED | OUTPATIENT
Start: 2022-02-15 | End: 2022-04-20

## 2022-03-07 DIAGNOSIS — J84.9 ILD (INTERSTITIAL LUNG DISEASE) (HCC): ICD-10-CM

## 2022-03-14 RX ORDER — ERGOCALCIFEROL 1.25 MG/1
CAPSULE ORAL
Qty: 13 CAPSULE | Refills: 3 | Status: SHIPPED | OUTPATIENT
Start: 2022-03-14 | End: 2022-10-25

## 2022-03-17 ENCOUNTER — OFFICE VISIT (OUTPATIENT)
Dept: RHEUMATOLOGY | Age: 57
End: 2022-03-17
Payer: COMMERCIAL

## 2022-03-17 VITALS
DIASTOLIC BLOOD PRESSURE: 76 MMHG | BODY MASS INDEX: 26.5 KG/M2 | HEIGHT: 60 IN | WEIGHT: 135 LBS | SYSTOLIC BLOOD PRESSURE: 110 MMHG

## 2022-03-17 DIAGNOSIS — J84.9 ILD (INTERSTITIAL LUNG DISEASE) (HCC): ICD-10-CM

## 2022-03-17 DIAGNOSIS — M19.90 INFLAMMATORY ARTHRITIS: ICD-10-CM

## 2022-03-17 DIAGNOSIS — Z79.899 HIGH RISK MEDICATION USE: ICD-10-CM

## 2022-03-17 DIAGNOSIS — D89.89 ANTISYNTHETASE SYNDROME (HCC): Primary | ICD-10-CM

## 2022-03-17 PROCEDURE — G8484 FLU IMMUNIZE NO ADMIN: HCPCS | Performed by: INTERNAL MEDICINE

## 2022-03-17 PROCEDURE — G8419 CALC BMI OUT NRM PARAM NOF/U: HCPCS | Performed by: INTERNAL MEDICINE

## 2022-03-17 PROCEDURE — 99215 OFFICE O/P EST HI 40 MIN: CPT | Performed by: INTERNAL MEDICINE

## 2022-03-17 PROCEDURE — 1036F TOBACCO NON-USER: CPT | Performed by: INTERNAL MEDICINE

## 2022-03-17 PROCEDURE — 3017F COLORECTAL CA SCREEN DOC REV: CPT | Performed by: INTERNAL MEDICINE

## 2022-03-17 PROCEDURE — G8427 DOCREV CUR MEDS BY ELIG CLIN: HCPCS | Performed by: INTERNAL MEDICINE

## 2022-03-21 DIAGNOSIS — E03.9 ACQUIRED HYPOTHYROIDISM: ICD-10-CM

## 2022-03-22 RX ORDER — LEVOTHYROXINE SODIUM 0.1 MG/1
TABLET ORAL
Qty: 90 TABLET | Refills: 3 | Status: SHIPPED | OUTPATIENT
Start: 2022-03-22

## 2022-04-20 RX ORDER — PREDNISONE 1 MG/1
TABLET ORAL
Qty: 90 TABLET | Refills: 0 | Status: SHIPPED | OUTPATIENT
Start: 2022-04-20 | End: 2022-07-27 | Stop reason: ALTCHOICE

## 2022-04-28 ENCOUNTER — TELEPHONE (OUTPATIENT)
Dept: INFUSION THERAPY | Age: 57
End: 2022-04-28

## 2022-05-26 DIAGNOSIS — Z79.899 HIGH RISK MEDICATION USE: ICD-10-CM

## 2022-05-26 LAB
BACTERIA: ABNORMAL /HPF
BILIRUBIN URINE: NEGATIVE
BLOOD, URINE: ABNORMAL
CALCIUM OXALATE CRYSTALS: PRESENT
CLARITY: ABNORMAL
COLOR: ABNORMAL
EPITHELIAL CELLS, UA: 15 /HPF (ref 0–5)
GLUCOSE URINE: NEGATIVE MG/DL
HYALINE CASTS: 0 /LPF (ref 0–8)
KETONES, URINE: ABNORMAL MG/DL
LEUKOCYTE ESTERASE, URINE: ABNORMAL
MICROSCOPIC EXAMINATION: YES
NITRITE, URINE: NEGATIVE
PH UA: 5.5 (ref 5–8)
PROTEIN UA: 30 MG/DL
RBC UA: 8 /HPF (ref 0–4)
SPECIFIC GRAVITY UA: 1.04 (ref 1–1.03)
TOTAL CK: 65 U/L (ref 26–192)
URINE TYPE: ABNORMAL
UROBILINOGEN, URINE: 1 E.U./DL
WBC UA: 102 /HPF (ref 0–5)

## 2022-05-28 LAB — ALDOLASE: 5.1 U/L (ref 1.2–7.6)

## 2022-06-02 ENCOUNTER — OFFICE VISIT (OUTPATIENT)
Dept: RHEUMATOLOGY | Age: 57
End: 2022-06-02
Payer: COMMERCIAL

## 2022-06-02 VITALS
DIASTOLIC BLOOD PRESSURE: 76 MMHG | BODY MASS INDEX: 26.5 KG/M2 | WEIGHT: 135 LBS | HEIGHT: 60 IN | SYSTOLIC BLOOD PRESSURE: 110 MMHG

## 2022-06-02 DIAGNOSIS — Z79.899 HIGH RISK MEDICATION USE: ICD-10-CM

## 2022-06-02 DIAGNOSIS — M19.90 INFLAMMATORY ARTHRITIS: Primary | ICD-10-CM

## 2022-06-02 DIAGNOSIS — D89.89 ANTISYNTHETASE SYNDROME (HCC): ICD-10-CM

## 2022-06-02 DIAGNOSIS — J84.9 ILD (INTERSTITIAL LUNG DISEASE) (HCC): ICD-10-CM

## 2022-06-02 LAB
A/G RATIO: 1.8 (ref 1.1–2.2)
ALBUMIN SERPL-MCNC: 4.3 G/DL (ref 3.4–5)
ALP BLD-CCNC: 69 U/L (ref 40–129)
ALT SERPL-CCNC: 13 U/L (ref 10–40)
ANION GAP SERPL CALCULATED.3IONS-SCNC: 14 MMOL/L (ref 3–16)
AST SERPL-CCNC: 19 U/L (ref 15–37)
BASOPHILS ABSOLUTE: 0 K/UL (ref 0–0.2)
BASOPHILS RELATIVE PERCENT: 0 %
BILIRUB SERPL-MCNC: <0.2 MG/DL (ref 0–1)
BUN BLDV-MCNC: 11 MG/DL (ref 7–20)
CALCIUM SERPL-MCNC: 9.2 MG/DL (ref 8.3–10.6)
CHLORIDE BLD-SCNC: 106 MMOL/L (ref 99–110)
CO2: 23 MMOL/L (ref 21–32)
CREAT SERPL-MCNC: 0.7 MG/DL (ref 0.6–1.1)
EOSINOPHILS ABSOLUTE: 0.1 K/UL (ref 0–0.6)
EOSINOPHILS RELATIVE PERCENT: 1 %
GFR AFRICAN AMERICAN: >60
GFR NON-AFRICAN AMERICAN: >60
GLUCOSE BLD-MCNC: 87 MG/DL (ref 70–99)
HCT VFR BLD CALC: 40.3 % (ref 36–48)
HEMOGLOBIN: 13.2 G/DL (ref 12–16)
LYMPHOCYTES ABSOLUTE: 1.2 K/UL (ref 1–5.1)
LYMPHOCYTES RELATIVE PERCENT: 18 %
MCH RBC QN AUTO: 29.5 PG (ref 26–34)
MCHC RBC AUTO-ENTMCNC: 32.6 G/DL (ref 31–36)
MCV RBC AUTO: 90.3 FL (ref 80–100)
MONOCYTES ABSOLUTE: 0.3 K/UL (ref 0–1.3)
MONOCYTES RELATIVE PERCENT: 5 %
NEUTROPHILS ABSOLUTE: 5 K/UL (ref 1.7–7.7)
NEUTROPHILS RELATIVE PERCENT: 76 %
PDW BLD-RTO: 14.5 % (ref 12.4–15.4)
PLATELET # BLD: 304 K/UL (ref 135–450)
PLATELET SLIDE REVIEW: ADEQUATE
PMV BLD AUTO: 7.9 FL (ref 5–10.5)
POTASSIUM SERPL-SCNC: 4.4 MMOL/L (ref 3.5–5.1)
RBC # BLD: 4.47 M/UL (ref 4–5.2)
RBC # BLD: NORMAL 10*6/UL
SLIDE REVIEW: NORMAL
SODIUM BLD-SCNC: 143 MMOL/L (ref 136–145)
TOTAL PROTEIN: 6.7 G/DL (ref 6.4–8.2)
WBC # BLD: 6.6 K/UL (ref 4–11)

## 2022-06-02 PROCEDURE — G8419 CALC BMI OUT NRM PARAM NOF/U: HCPCS | Performed by: INTERNAL MEDICINE

## 2022-06-02 PROCEDURE — 99215 OFFICE O/P EST HI 40 MIN: CPT | Performed by: INTERNAL MEDICINE

## 2022-06-02 PROCEDURE — 1036F TOBACCO NON-USER: CPT | Performed by: INTERNAL MEDICINE

## 2022-06-02 PROCEDURE — G8427 DOCREV CUR MEDS BY ELIG CLIN: HCPCS | Performed by: INTERNAL MEDICINE

## 2022-06-02 PROCEDURE — 3017F COLORECTAL CA SCREEN DOC REV: CPT | Performed by: INTERNAL MEDICINE

## 2022-06-02 RX ORDER — SULFAMETHOXAZOLE AND TRIMETHOPRIM 400; 80 MG/1; MG/1
TABLET ORAL
Qty: 36 TABLET | Refills: 0 | Status: SHIPPED | OUTPATIENT
Start: 2022-06-02 | End: 2022-10-25 | Stop reason: ALTCHOICE

## 2022-06-02 NOTE — PROGRESS NOTES
[address] [facility] [place]                                                                                                                         [phone] (D) [phone] (F)    Note is transcribed using voice recognition software. Inadvertent computerized transcription errors may be present. Patient identification: Jayne Hays,: ,59 y.o. Sex: female     A/P  Linda was seen today for follow-up. 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 A/P from Today's visit-    1. Polymyositis-anti-Deidra syndrome with  ILD-   No musculoskeletal symptoms or respiratory concerns or complaints. PFT from 2022-normal.  Inflammatory arthritis is in remission. Normal CK and aldolase. -Redose rituximab, reduce CellCept 1500 mg a day after she receives infusion, stay on 5 mg of prednisone daily. Continue Bactrim PCP prophylaxis for now. Safety labs will be checked today. Paperwork completed for rituximab infusion at [facility]. Previous therapy-   Rituxan 2021, and 2021. 2021, Dec   IV IG- May 26- ,  July, Aug, sept, Oct,  2021 at [facility] IV center.       PFT:  21- PFT-FVC 60% predicted, TLC 79% predicted, DLCO 54% predicted. 8/6/21     FVC 75% pred, TLC 80% predicted, DLCO 72% pred  2/3/2022-normal PFT. 6-minute walk-desaturated to 90% but remained asymptomatic.    2.-Inflammatory arthritis asymptomatic. 3. Bone health -no fragility fractures. 4/24/2021 lumbar T score -2, left hip -1.6, femoral neck -1.6. Right hip -1.7, femoral neck -1.3  9/20/2019 lumbar T score -1.8, left femoral neck -1.7. Reclast 6/22/2021. continue calcium and vitamin D supplementation. Plan to redose Reclast in 2023 as prophylactic therapy. 4.  Scaly rash in the scalp-active-typically gets under control rituximab. Is followed by dermatologist.    She is aware of myositis emergencies. RTC 3 months. Patient indicates understanding and agrees with the management plan. I reviewed patient's history, referral documents and electronic medical records. Total time> 42 minutes history taking, exam, review treatment plan, medical documentation. #######################################################################    Subjective-  Follow-up for polymyositis, inflammatory polyarthritis and osteopenia. Interval changes-  She denies any complaints or concerns today other than very aggressive scaly rash in her scalp from chronic dermatitis. Denies any painful, swollen, stiff joints. No shortness of breath or cough. Exercise tolerance is good. Normal ADLs and decrease in activities. No muscle weakness. CK and aldolase normal.  Compliant with medications. All other review of systems are negative.     No family history of autoimmune diseases    Current Outpatient Medications   Medication Sig Dispense Refill    sulfamethoxazole-trimethoprim (BACTRIM) 400-80 MG per tablet Take 1 tab three times a week 36 tablet 0    predniSONE (DELTASONE) 5 MG tablet TAKE 1 TABLET BY MOUTH  DAILY 90 tablet 0    levothyroxine (SYNTHROID) 100 MCG tablet TAKE 1 TABLET BY MOUTH  DAILY 90 tablet 3    vitamin D (ERGOCALCIFEROL) 1.25 MG (42121 UT) CAPS capsule TAKE 1 CAPSULE BY MOUTH  ONCE WEEKLY 13 capsule 3    mycophenolate (CELLCEPT) 500 MG tablet TAKE 3 TABLETS BY MOUTH  TWICE DAILY 540 tablet 1    sulfamethoxazole-trimethoprim (BACTRIM DS;SEPTRA DS) 800-160 MG per tablet Take 3 times a week- M/W/F 36 tablet 3    predniSONE (DELTASONE) 5 MG tablet Take 2 tab po daily x 15 days, take1.5 mg po daily x 15 days then take 1 tab po daily. 120 tablet 1    Lactobacillus (PROBIOTIC ACIDOPHILUS) CAPS Take one capsule once daily 30 capsule 5    clobetasol (TEMOVATE) 0.05 % ointment Apply to affected area twice daily on body and hands 60 g 2    triamcinolone (KENALOG) 0.025 % cream Apply to affected area on face twice daily 80 g 2    ibuprofen (ADVIL;MOTRIN) 600 MG tablet Take 1 tablet by mouth every 6 hours as needed for Pain 60 tablet 1    ALBUTEROL SULFATE IN Inhale into the lungs as needed      ergocalciferol (ERGOCALCIFEROL) 75115 UNIT capsule Take 1 capsule by mouth once a week for 120 days. Weekly for four months 4 capsule 4    zoledronic acid (RECLAST) 5 MG/100ML SOLN Infuse 100 mLs intravenously once for 1 dose 100 mL 0     No current facility-administered medications for this visit. Allergies   Allergen Reactions    Wasp Venom Other (See Comments)       PHYSICAL EXAM:    Vitals:    /76   Ht 5' (1.524 m)   Wt 135 lb (61.2 kg)   LMP 09/12/2013   BMI 26.37 kg/m²   General appearance/ Psychiatric: well nourished, and well groomed, normal judgement, alert, appears stated age and cooperative. MKS:No appreciable tender, swollen, inflamed joints in upper or lower extremities. Normal range of motion in peripheral joints in upper and lower extremities. Normal gait and muscle strength in upper and lower extremities. Chest-normal effort clear to auscultation, S1-S2 regular no added sounds. No rashes, periungual changes resolved.    Scalp-erythema and scaly rash diffuse in his scalp mainly in her hairline anterior and posterior. DATA:   Lab Results   Component Value Date    WBC 6.2 03/12/2022    HGB 13.0 03/12/2022    HCT 40.4 03/12/2022    MCV 89.3 03/12/2022     03/12/2022         Chemistry        Component Value Date/Time     03/12/2022 0955    K 4.0 03/12/2022 0955     03/12/2022 0955    CO2 24 03/12/2022 0955    BUN 8 03/12/2022 0955    CREATININE 0.7 03/12/2022 0955        Component Value Date/Time    CALCIUM 9.2 03/12/2022 0955    ALKPHOS 59 03/12/2022 0955    AST 16 03/12/2022 0955    ALT 17 03/12/2022 0955    BILITOT 0.3 03/12/2022 0955          No results found for: [facility]  Lab Results   Component Value Date    SEDRATE 20 03/12/2022     Lab Results   Component Value Date    CRP <3.0 03/12/2022     Lab Results   Component Value Date    MICHELLE POSITIVE (A) 06/12/2019    SEDRATE 20 03/12/2022     Lab Results   Component Value Date    CKTOTAL 65 05/26/2022     Lab Results   Component Value Date    TSH 2.40 07/26/2018     Lab Results   Component Value Date    VITD25 22.5 (L) 08/19/2020         Radiology Review:     CT chest 5/2021-lower lobe predominant reticular linear opacities with honeycombing and traction bronchiectasis consistent with UIP. Total time >40 minutes- reviewing medical records & pre charting on the same day of visit,  history taking, exam, explaining medical diagnosis, management plan, ordering labs, filling medications, communicating findings with other providers and medical documentation in EHR.

## 2022-06-06 LAB
IGA: 118 MG/DL (ref 70–400)
IGG: 910 MG/DL (ref 700–1600)
IGM: 68 MG/DL (ref 40–230)

## 2022-07-20 ENCOUNTER — TELEPHONE (OUTPATIENT)
Dept: FAMILY MEDICINE CLINIC | Age: 57
End: 2022-07-20

## 2022-07-20 DIAGNOSIS — Z00.00 ROUTINE GENERAL MEDICAL EXAMINATION AT A HEALTH CARE FACILITY: Primary | ICD-10-CM

## 2022-07-27 ENCOUNTER — OFFICE VISIT (OUTPATIENT)
Dept: FAMILY MEDICINE CLINIC | Age: 57
End: 2022-07-27
Payer: COMMERCIAL

## 2022-07-27 VITALS
SYSTOLIC BLOOD PRESSURE: 124 MMHG | TEMPERATURE: 97.3 F | BODY MASS INDEX: 28.47 KG/M2 | WEIGHT: 145 LBS | HEIGHT: 60 IN | OXYGEN SATURATION: 96 % | DIASTOLIC BLOOD PRESSURE: 82 MMHG | HEART RATE: 73 BPM

## 2022-07-27 DIAGNOSIS — L40.9 PSORIASIS: ICD-10-CM

## 2022-07-27 DIAGNOSIS — J84.9 ILD (INTERSTITIAL LUNG DISEASE) (HCC): ICD-10-CM

## 2022-07-27 DIAGNOSIS — Z00.00 ROUTINE GENERAL MEDICAL EXAMINATION AT A HEALTH CARE FACILITY: ICD-10-CM

## 2022-07-27 PROCEDURE — 99396 PREV VISIT EST AGE 40-64: CPT | Performed by: NURSE PRACTITIONER

## 2022-07-27 ASSESSMENT — ENCOUNTER SYMPTOMS
NAUSEA: 0
EYE ITCHING: 0
SINUS PRESSURE: 0
BLOOD IN STOOL: 0
SHORTNESS OF BREATH: 0
CONSTIPATION: 0
VOMITING: 0
BACK PAIN: 0
CHEST TIGHTNESS: 0
COLOR CHANGE: 0
DIARRHEA: 0
RHINORRHEA: 0
EYE REDNESS: 0
ABDOMINAL PAIN: 0
WHEEZING: 0
COUGH: 0
SORE THROAT: 0

## 2022-07-27 ASSESSMENT — PATIENT HEALTH QUESTIONNAIRE - PHQ9
2. FEELING DOWN, DEPRESSED OR HOPELESS: 0
SUM OF ALL RESPONSES TO PHQ QUESTIONS 1-9: 0
SUM OF ALL RESPONSES TO PHQ9 QUESTIONS 1 & 2: 0
SUM OF ALL RESPONSES TO PHQ QUESTIONS 1-9: 0
1. LITTLE INTEREST OR PLEASURE IN DOING THINGS: 0

## 2022-07-27 NOTE — PROGRESS NOTES
Subjective:     CC:  Annual Exam      HPI:  New Buck is here for a comprehensive physical exam.  She needs paperwork for work. She has been working from home and has gained 10lbs since last visit, knows that she has been drinking more pop and lemonade.    Vitals:    07/27/22 1613   BP: 124/82   Pulse: 73   Temp: 97.3 °F (36.3 °C)   SpO2: 96%       Wt Readings from Last 3 Encounters:   07/27/22 145 lb (65.8 kg)   06/02/22 135 lb (61.2 kg)   03/17/22 135 lb (61.2 kg)       Past Medical History:   Diagnosis Date    Allergic rhinitis     Arthritis     Asthma     Chronic sinusitis     Dermatitis     Hypothyroidism     Polymyositis ([address]     Senile osteoporosis 6/17/2021       Past Surgical History:   Procedure Laterality Date    CARPAL TUNNEL RELEASE Right 9/11/2019    RIGHT CARPAL TUNNEL RELEASE performed by Lizbeth Jones MD at [address] [place] [address] Left 12/27/2019    LEFT CARPAL TUNNEL RELEASE performed by Lizbeth Jones MD at [address] [place] [address]    SKIN BIOPSY      TONSILLECTOMY      WISDOM TOOTH EXTRACTION         Family History   Problem Relation Age of Onset    Heart Disease Mother         afib    Stroke Mother     Atrial Fibrillation Mother     COPD Mother     Diabetes Father     Breast Cancer Paternal Cousin     Ovarian Cancer Paternal Cousin     Breast Cancer Maternal Cousin        Social History     Tobacco Use    Smoking status: Passive Smoke Exposure - Never Smoker    Smokeless tobacco: Never   Vaping Use    Vaping Use: Never used   Substance Use Topics    Alcohol use: No     Alcohol/week: 0.0 standard drinks    Drug use: No       Immunization History   Administered Date(s) Administered    COVID-19, J&J, (age 18y+), IM, 0.5 mL 04/01/2021    COVID-19, PFIZER GRAY top, DO NOT Dilute, (age 15 y+), IM, 30 mcg/0.3 mL 04/15/2022    COVID-19, PFIZER PURPLE top, DILUTE for use, (age 15 y+), 30mcg/0.3mL 10/28/2021    Hepatitis B Adult (Engerix-B) 06/27/2019, 07/24/2019, 08/30/2019    Influenza Virus Vaccine 10/22/2019    Influenza, Niall Marinas, IM, PF (6 mo and older Fluzone, Flulaval, Fluarix, and 3 yrs and older Afluria) 10/13/2016, 10/18/2018, 11/06/2020    Influenza, Niall Marinas, Recombinant, IM PF (Flublok 18 yrs and older) 10/25/2019    Pneumococcal Polysaccharide ([id number]) 10/23/2015, 10/08/2021    Tdap (Boostrix, Adacel) 09/12/2014    Zoster Recombinant (Shingrix) 11/20/2019, 02/19/2020       Health Maintenance   Topic Date Due    Cervical cancer screen  Never done    COVID-19 Vaccine (4 - Booster for Eliza series) 08/15/2022    Flu vaccine (1) 09/01/2022    Pneumococcal 0-64 years Vaccine (3 - PCV) 10/08/2022    Depression Screen  07/27/2023    Breast cancer screen  08/27/2023    DTaP/Tdap/Td vaccine (2 - Td or Tdap) 09/12/2024    Colorectal Cancer Screen  08/29/2026    Lipids  07/23/2027    Shingles vaccine  Completed    Hepatitis C screen  Completed    HIV screen  Completed    Hepatitis A vaccine  Aged Out    Hepatitis B vaccine  Aged Out    Hib vaccine  Aged Out    Meningococcal (ACWY) vaccine  Aged Out       Review of Systems   Constitutional:  Negative for chills, fatigue and fever. HENT:  Negative for congestion, ear pain, postnasal drip, rhinorrhea, sinus pressure, sneezing and sore throat. Eyes:  Negative for redness and itching. Respiratory:  Negative for cough, chest tightness, shortness of breath and wheezing. Cardiovascular:  Negative for chest pain and palpitations. Gastrointestinal:  Negative for abdominal pain, blood in stool, constipation, diarrhea, nausea and vomiting. Endocrine: Negative for cold intolerance and heat intolerance. Genitourinary:  Negative for difficulty urinating, dysuria, flank pain, frequency, hematuria and urgency. Musculoskeletal:  Negative for arthralgias, back pain, joint swelling and myalgias. Skin:  Negative for color change, pallor, rash and wound.    Allergic/Immunologic: Negative for environmental allergies and food allergies. Neurological:  Negative for dizziness, seizures, syncope, weakness, light-headedness, numbness and headaches. Hematological:  Negative for adenopathy. Does not bruise/bleed easily. Psychiatric/Behavioral:  Negative for confusion, sleep disturbance and suicidal ideas. The patient is not nervous/anxious and is not hyperactive. Objective:     Physical Exam  Constitutional:       Appearance: Normal appearance. She is well-developed and normal weight. HENT:      Head: Normocephalic and atraumatic. Right Ear: Hearing, tympanic membrane, ear canal and external ear normal.      Left Ear: Hearing, tympanic membrane, ear canal and external ear normal.      Nose: Nose normal. No mucosal edema. Right Sinus: No maxillary sinus tenderness or frontal sinus tenderness. Left Sinus: No maxillary sinus tenderness or frontal sinus tenderness. Mouth/Throat:      Mouth: Mucous membranes are moist.      Tonsils: No tonsillar abscesses. Eyes:      Extraocular Movements: Extraocular movements intact. Conjunctiva/sclera: Conjunctivae normal.      Pupils: Pupils are equal, round, and reactive to light. Cardiovascular:      Rate and Rhythm: Normal rate and regular rhythm. Pulses: Normal pulses. Heart sounds: Normal heart sounds. Pulmonary:      Effort: Pulmonary effort is normal.      Breath sounds: Normal breath sounds. No wheezing. Abdominal:      General: Abdomen is flat. Bowel sounds are normal.      Palpations: Abdomen is soft. Tenderness: There is no abdominal tenderness. Musculoskeletal:         General: Normal range of motion. Cervical back: Normal range of motion and neck supple. Lymphadenopathy:      Head:      Right side of head: No submental, submandibular, tonsillar, preauricular, posterior auricular or occipital adenopathy.       Left side of head: No submental, submandibular, tonsillar, preauricular, posterior auricular or occipital adenopathy. Skin:     General: Skin is warm and dry. Neurological:      General: No focal deficit present. Mental Status: She is alert and oriented to person, place, and time. Psychiatric:         Mood and Affect: Mood normal.         Behavior: Behavior normal.         Thought Content: Thought content normal.       Assessment:      See ProblemList assessment and plan       PHQ Scores 7/27/2022 4/9/2021 9/4/2020 1/28/2020 8/30/2019 3/12/2019 12/27/2018   PHQ2 Score 0 0 0 0 0 0 0   PHQ9 Score 0 0 0 0 0 0 0     Interpretation of Total Score Depression Severity: 1-4 = Minimal depression, 5-9 = Milddepression, 10-14 = Moderate depression, 15-19 = Moderately severe depression, 20-27 = Severe depression    Plan:      Routine general medical examination at a health care facility   Well exam in office  reviewd labs  Reviewed HM    Psoriasis   Follows with derm and rheumatology       ILD (interstitial lung disease) ([facility] [address]   Stable, controlled  No changes  Continue current treatment plan   Follows with pulm                 Discussed over the counter medication with patient. Linda received counseling on the following healthybehaviors: nutrition, exercise, and medication adherence    Patient given educational materials on their chronic medical conditions    Discussed use, benefit, and side effects of prescribed medications. Barriersto medication compliance addressed. All patient questions answered. Patient voiced understanding. Medications reviewed and patient understands.   Questions answered

## 2022-08-01 ENCOUNTER — OFFICE VISIT (OUTPATIENT)
Dept: RHEUMATOLOGY | Age: 57
End: 2022-08-01
Payer: COMMERCIAL

## 2022-08-01 VITALS
WEIGHT: 145 LBS | SYSTOLIC BLOOD PRESSURE: 120 MMHG | DIASTOLIC BLOOD PRESSURE: 76 MMHG | HEIGHT: 60 IN | BODY MASS INDEX: 28.47 KG/M2

## 2022-08-01 DIAGNOSIS — D89.89 ANTISYNTHETASE SYNDROME (HCC): Primary | ICD-10-CM

## 2022-08-01 DIAGNOSIS — Z79.899 HIGH RISK MEDICATION USE: ICD-10-CM

## 2022-08-01 DIAGNOSIS — J84.9 ILD (INTERSTITIAL LUNG DISEASE) (HCC): ICD-10-CM

## 2022-08-01 DIAGNOSIS — M19.90 INFLAMMATORY ARTHRITIS: ICD-10-CM

## 2022-08-01 LAB
ALT SERPL-CCNC: 7 U/L (ref 10–40)
AST SERPL-CCNC: 13 U/L (ref 15–37)
C-REACTIVE PROTEIN: 6.4 MG/L (ref 0–5.1)
CREAT SERPL-MCNC: 0.5 MG/DL (ref 0.6–1.1)
GFR AFRICAN AMERICAN: >60
GFR NON-AFRICAN AMERICAN: >60
TOTAL CK: 44 U/L (ref 26–192)

## 2022-08-01 PROCEDURE — 1036F TOBACCO NON-USER: CPT | Performed by: INTERNAL MEDICINE

## 2022-08-01 PROCEDURE — G8427 DOCREV CUR MEDS BY ELIG CLIN: HCPCS | Performed by: INTERNAL MEDICINE

## 2022-08-01 PROCEDURE — 36415 COLL VENOUS BLD VENIPUNCTURE: CPT | Performed by: INTERNAL MEDICINE

## 2022-08-01 PROCEDURE — 3017F COLORECTAL CA SCREEN DOC REV: CPT | Performed by: INTERNAL MEDICINE

## 2022-08-01 PROCEDURE — G8419 CALC BMI OUT NRM PARAM NOF/U: HCPCS | Performed by: INTERNAL MEDICINE

## 2022-08-01 PROCEDURE — 99214 OFFICE O/P EST MOD 30 MIN: CPT | Performed by: INTERNAL MEDICINE

## 2022-08-01 RX ORDER — SULFAMETHOXAZOLE AND TRIMETHOPRIM 800; 160 MG/1; MG/1
TABLET ORAL
Qty: 36 TABLET | Refills: 2 | Status: SHIPPED | OUTPATIENT
Start: 2022-08-01

## 2022-08-01 NOTE — PROGRESS NOTES
[address] [facility] MD                                                                                                                         [phone] (U) [phone] (F)    Note is transcribed using voice recognition software. Inadvertent computerized transcription errors may be present. Patient identification: Mindy Hays,: ,74 y.o. Sex: female     A/P  Linda was seen today for follow-up. 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 A/P from Today's visit-    1. Polymyositis-anti-Deidra syndrome with  ILD-   No complaints or concerns at this time other than pruritic scaly rash in her scalp. That has been ongoing problem even before the diagnosis of invasive any syndrome. No musculoskeletal symptoms or respiratory concerns or complaints. PFT from 2022-normal.  Inflammatory arthritis is in remission. Normal CK and aldolase.  -Just had rituximab infusions in 2022, is on CellCept 1500 mg a day and prednisone 5 mg a day along with Bactrim prophylaxis.  -Check safety and disease activity labs.   Reduce prednisone 2.5 mg and 5 mg every other day for 1 month, thereafter 2.5 mg a day. If her CT is normal, we will plan to reduce CellCept 1 g a day. Check PFTs. Dermatology referral for scaly scalp rash. Currently on previous therapy-   Rituxan 5/7/2021, and 6/11/2021. 12/7/2021, Dec 21/2021, July 3 and July 17, 2022. IV IG- May 26- 28,  July, Aug, sept, Oct,  Nov 2021 at [facility]. PFT:  4/9/21- PFT-FVC 60% predicted, TLC 79% predicted, DLCO 54% predicted. 8/6/21     FVC 75% pred, TLC 80% predicted, DLCO 72% pred  2/3/2022-normal PFT. 6-minute walk-desaturated to 90% but remained asymptomatic.    2.-Inflammatory arthritis asymptomatic. 3. Bone health -no fragility fractures. Stable. 4/24/2021 lumbar T score -2, left hip -1.6, femoral neck -1.6. Right hip -1.7, femoral neck -1.3  9/20/2019 lumbar T score -1.8, left femoral neck -1.7. Reclast 6/22/2021. continue calcium and vitamin D supplementation. Plan to redose Reclast in 2023 as prophylactic therapy. She is aware of myositis emergencies. RTC 3 months. Patient indicates understanding and agrees with the management plan. I reviewed patient's history, referral documents and electronic medical records. Total time> 42 minutes history taking, exam, review treatment plan, medical documentation. #######################################################################    Subjective-  Follow-up for polymyositis, inflammatory polyarthritis and osteopenia. Interval changes-  She had rituximab infusion last month, did well. Continues to take CellCept and prednisone. Other than pruritic scalp scaly rash, she does not have any other complaints or concerns. Very active physically, exercises regularly. Denies any exertional shortness of breath. Exercise tolerance is great. Normal ADLs and recreational activities. No muscle weakness. CK and aldolase normal.  Compliant with medications. All other review of systems are negative.     No family history of autoimmune diseases    Current Outpatient Medications   Medication Sig Dispense Refill    sulfamethoxazole-trimethoprim (BACTRIM DS) 800-160 MG per tablet Take 1 tab three times a week 36 tablet 2    sulfamethoxazole-trimethoprim (BACTRIM) 400-80 MG per tablet Take 1 tab three times a week 36 tablet 0    levothyroxine (SYNTHROID) 100 MCG tablet TAKE 1 TABLET BY MOUTH  DAILY 90 tablet 3    vitamin D (ERGOCALCIFEROL) 1.25 MG (25828 UT) CAPS capsule TAKE 1 CAPSULE BY MOUTH  ONCE WEEKLY 13 capsule 3    mycophenolate (CELLCEPT) 500 MG tablet TAKE 3 TABLETS BY MOUTH  TWICE DAILY 540 tablet 1    predniSONE (DELTASONE) 5 MG tablet Take 2 tab po daily x 15 days, take1.5 mg po daily x 15 days then take 1 tab po daily. 120 tablet 1    Lactobacillus (PROBIOTIC ACIDOPHILUS) CAPS Take one capsule once daily 30 capsule 5    clobetasol (TEMOVATE) 0.05 % ointment Apply to affected area twice daily on body and hands 60 g 2    triamcinolone (KENALOG) 0.025 % cream Apply to affected area on face twice daily 80 g 2    ibuprofen (ADVIL;MOTRIN) 600 MG tablet Take 1 tablet by mouth every 6 hours as needed for Pain 60 tablet 1    ALBUTEROL SULFATE IN Inhale into the lungs as needed      ergocalciferol (ERGOCALCIFEROL) 30925 UNIT capsule Take 1 capsule by mouth once a week for 120 days. Weekly for four months 4 capsule 4    zoledronic acid (RECLAST) 5 MG/100ML SOLN Infuse 100 mLs intravenously once for 1 dose 100 mL 0     No current facility-administered medications for this visit. Allergies   Allergen Reactions    Wasp Venom Other (See Comments)       PHYSICAL EXAM:    Vitals:    /76   Ht 5' (1.524 m)   Wt 145 lb (65.8 kg)   LMP 09/12/2013   BMI 28.32 kg/m²   General appearance/ Psychiatric: well nourished, and well groomed, normal judgement, alert, appears stated age and cooperative. MKS: Normal joint exam in upper, lower extremities and spine. Normal range of motion in peripheral joints in upper and lower extremities. Normal gait and muscle strength in upper and lower extremities. Chest-normal effort clear to auscultation, S1-S2 regular no added sounds. No rashes, periungual changes resolved. Scalp-erythema and scaly rash diffuse in his scalp mainly in her hairline anterior and posterior. DATA:   Lab Results   Component Value Date    WBC 6.5 07/23/2022    HGB 13.6 07/23/2022    HCT 41.3 07/23/2022    MCV 90.3 07/23/2022     07/23/2022         Chemistry        Component Value Date/Time     06/02/2022 0821    K 4.4 06/02/2022 0821     06/02/2022 0821    CO2 23 06/02/2022 0821    BUN 11 06/02/2022 0821    CREATININE 0.7 06/02/2022 0821        Component Value Date/Time    CALCIUM 9.2 06/02/2022 0821    ALKPHOS 69 06/02/2022 0821    AST 19 06/02/2022 0821    ALT 13 06/02/2022 0821    BILITOT <0.2 06/02/2022 0821          No results found for: [facility]  Lab Results   Component Value Date    SEDRATE 20 03/12/2022     Lab Results   Component Value Date    CRP <3.0 03/12/2022     Lab Results   Component Value Date    MICHELLE POSITIVE (A) 06/12/2019    SEDRATE 20 03/12/2022     Lab Results   Component Value Date    CKTOTAL 65 05/26/2022     Lab Results   Component Value Date    TSH 2.40 07/26/2018     Lab Results   Component Value Date    VITD25 31.3 07/23/2022         Radiology Review:     CT chest 5/2021-lower lobe predominant reticular linear opacities with honeycombing and traction bronchiectasis consistent with UIP. Total time 35 minutes- reviewing medical records & pre charting on the same day of visit,  history taking, exam, explaining medical diagnosis, management plan, ordering labs, filling medications, communicating findings with other providers and medical documentation in EHR.

## 2022-08-10 ENCOUNTER — TELEPHONE (OUTPATIENT)
Dept: DERMATOLOGY | Age: 57
End: 2022-08-10

## 2022-08-10 NOTE — TELEPHONE ENCOUNTER
Patient referred by Dr Garcia Estrada for scalp rash. Previous Dr Simona Pacheco patient. Dr Phillip Mera agreeable to see patient. Called and left message for patient to call back office. Can offer appointment for today (8/10) @ 10:45am if still available. If not can watch for another cancellation with Dr Phillip Mera or schedule with Dr Glendy Mata.

## 2022-08-11 ENCOUNTER — PATIENT MESSAGE (OUTPATIENT)
Dept: RHEUMATOLOGY | Age: 57
End: 2022-08-11

## 2022-08-11 DIAGNOSIS — L40.9 PSORIASIS: Primary | ICD-10-CM

## 2022-08-11 RX ORDER — MYCOPHENOLATE MOFETIL 500 MG/1
TABLET ORAL
Qty: 540 TABLET | Refills: 0 | Status: SHIPPED | OUTPATIENT
Start: 2022-08-11

## 2022-08-11 NOTE — TELEPHONE ENCOUNTER
Called and left message for patient to call back office. Please offer cancellation for 8/12/22 if still available.

## 2022-08-15 RX ORDER — PREDNISONE 2.5 MG
2.5 TABLET ORAL DAILY
Qty: 90 TABLET | Refills: 0 | Status: SHIPPED | OUTPATIENT
Start: 2022-08-15

## 2022-08-15 NOTE — TELEPHONE ENCOUNTER
From: Cayla Hays  To: Dr. Kylee Vazquez  Sent: 8/11/2022 11:19 AM EDT  Subject: Predisone 2.5 mg    Hi, per my phone conversation with Robert, please send a new script for 2.5 mg predisone to [facility]. Doctor lowered from 5 to 2.5 on last visit. Thanks.

## 2022-08-16 ENCOUNTER — HOSPITAL ENCOUNTER (OUTPATIENT)
Dept: PULMONOLOGY | Age: 57
Discharge: HOME | End: 2022-08-16
Payer: COMMERCIAL

## 2022-08-16 DIAGNOSIS — J84.9 ILD (INTERSTITIAL LUNG DISEASE) (HCC): ICD-10-CM

## 2022-08-16 PROCEDURE — 6360000002 HC RX W HCPCS: Performed by: INTERNAL MEDICINE

## 2022-08-16 PROCEDURE — 94618 PULMONARY STRESS TESTING: CPT

## 2022-08-16 PROCEDURE — 94729 DIFFUSING CAPACITY: CPT

## 2022-08-16 PROCEDURE — 94760 N-INVAS EAR/PLS OXIMETRY 1: CPT

## 2022-08-16 PROCEDURE — 94060 EVALUATION OF WHEEZING: CPT

## 2022-08-16 PROCEDURE — 94726 PLETHYSMOGRAPHY LUNG VOLUMES: CPT

## 2022-08-16 PROCEDURE — 94664 DEMO&/EVAL PT USE INHALER: CPT

## 2022-08-16 RX ORDER — ALBUTEROL SULFATE 2.5 MG/3ML
2.5 SOLUTION RESPIRATORY (INHALATION) ONCE
Status: COMPLETED | OUTPATIENT
Start: 2022-08-16 | End: 2022-08-16

## 2022-08-16 RX ADMIN — ALBUTEROL SULFATE 2.5 MG: 2.5 SOLUTION RESPIRATORY (INHALATION) at 17:16

## 2022-08-16 NOTE — PROGRESS NOTES
Six Minute Walk     []  Desaturation Screening []  Endurance Test       Name:  Naman Ontiveros Record Number:  [id number]  Age: 64 y.o. Gender: female  : 1965  Today's Date:  2022  Pulmonary History: ILD  Home Oxygen Therapy:  room air  Home Respiratory Therapy:None                    6 MINUTE WALK DATA    Modality Time (Minutes) SPO2 RR B/P Heart Rate O2 SOB Pain Level   Rest 2 97 20 123/71 74 RA 0 0   Walk 1 92   107 RA 0 0   Walk 2 91   89 RA 0 0   Walk 3 93   93 RA 0 0   Walk 4 90   94 RA 0 0   Walk 5 93   100 RA 0 0   Walk 6 92   95 RA 0 0   Recovery 2 97 20 107/74 73 RA 0 0     Nazia SOB Scale:  0=Nothing at all; 0.5=Very, very slight; 1=Very slight; 2=Slight; 3=Moderate; 4=Somewhat severe; 5=Severe; 7=Very Severe; 10=Very, very Severe    Exercise Summary:  Distance Walked (feet): 780  Lowest SpO2 During Walk:  90  (FIO2)  RA  Walking Pace (Steps per Minute)  80  Stopped or Paused during Walk:  no    Comments / Reason:  Patient walked at her own pace.   She occasionally dropped to 88-89 but immediately was 90 or greater      Electronically signed by Angeles Lee RCP on 2022 at 5:58 PM

## 2022-08-18 ENCOUNTER — TELEPHONE (OUTPATIENT)
Dept: FAMILY MEDICINE CLINIC | Age: 57
End: 2022-08-18

## 2022-08-18 DIAGNOSIS — L40.9 PSORIASIS: Primary | ICD-10-CM

## 2022-08-18 NOTE — TELEPHONE ENCOUNTER
----- Message from Lisa Constant sent at 8/18/2022  1:11 PM EDT -----  Subject: Message to Provider    QUESTIONS  Information for Provider? Patient got referal for determologist Doctor   Faustino Valdez referal got sent and it says external on the referal and   it needs to say internal on referal.  ---------------------------------------------------------------------------  --------------  [id number] [employer]  [phone]; OK to leave message on voicemail  ---------------------------------------------------------------------------  --------------  SCRIPT ANSWERS  Relationship to Patient?  Self

## 2022-08-19 NOTE — PROCEDURES
[facility]               [address]                               PULMONARY FUNCTION    PATIENT NAME: Iona Mason                   :        1965  MED REC NO:   [id number]                          ROOM:  ACCOUNT NO:   [de-identified]                           ADMIT DATE: 2022  PROVIDER:     Ana Prieto MD    DATE OF PROCEDURE:  2022    PFT AND SIX-MINUTE WALK TEST    FINDINGS:  Spirometry for this patient shows an FEV1 of 1.78 which is  78% of predicted and a forced vital capacity of 2.35 which is 80% of  predicted, giving a ratio of 76. There was no significant response to  bronchodilators. Lung volumes were in the normal range with a total  lung capacity of 80% of predicted. Diffusion capacity was moderately  decreased prior to corrected for alveolar ventilation in which it  normalized. Six-minute walk test was performed on room air with resting  oxygen saturation of 97%. The patient's lowest oxygen saturation during  the walk was 90% and she was able to travel 780 feet without stopping. CONCLUSION:  Borderline study for both obstruction and restriction with  FEV1 and FVC straddling 80% of predicted and total lung capacity also  with 80% of predicted. There is no bronchodilator response. Diffusion  capacity does correct for alveolar ventilation. Six-minute walk test  did show exertional desaturations, but no hypoxemia and the patient does  not require supplemental oxygen while only being able to walk 780 feet  in 6 minutes.         Tenzin Gonzalez MD    D: 2022 13:39:34       T: 2022 14:00:25     [email or web address]  Job#: [id number]     Doc#: [id number]    CC:

## 2022-08-26 PROBLEM — Z00.00 ROUTINE GENERAL MEDICAL EXAMINATION AT A HEALTH CARE FACILITY: Status: RESOLVED | Noted: 2019-08-30 | Resolved: 2022-08-26

## 2022-09-07 ENCOUNTER — OFFICE VISIT (OUTPATIENT)
Dept: DERMATOLOGY | Age: 57
End: 2022-09-07
Payer: COMMERCIAL

## 2022-09-07 DIAGNOSIS — R21 RASH AND OTHER NONSPECIFIC SKIN ERUPTION: Primary | ICD-10-CM

## 2022-09-07 PROCEDURE — 99215 OFFICE O/P EST HI 40 MIN: CPT | Performed by: INTERNAL MEDICINE

## 2022-09-07 RX ORDER — CLOBETASOL PROPIONATE 0.46 MG/ML
SOLUTION TOPICAL
Qty: 50 ML | Refills: 2 | Status: SHIPPED | OUTPATIENT
Start: 2022-09-07

## 2022-09-07 NOTE — PATIENT INSTRUCTIONS
Thank you for visiting [facility] today!  Please follow the instructions below as we discussed in clinic:      Return to clinic for a scalp biopsy when you can  Start using clobetasol solution twice a day to scalp for itch

## 2022-09-07 NOTE — PROGRESS NOTES
[facility]ermat[facility]  Shukri Alberto MD  [phone]    Date of Visit: 9/7/2022  LV: Onel Flores 12/2020    PMH: dermatomyositis/antisynthetase syndrome     Pa Lundberg is a 64 y.o. female who presents for psoriasis follow up. Chief Complaint:   Chief Complaint   Patient presents with    Other     Head to thighs        History of Present Illness:    Concern:  Rash on scalp  Location: Worse in scalp  Duration:  Many years; pt thinks she's had some kind of scaly rash on scalp since childhood, but not sure if it's the same exact rash now  Symptoms: Very itchy with flares  Previous treatments:    -Prednisone  -Cellcept  -Otezla-diarrhea, headaches  -Rituximab + IVIG for dermatomyositis/polymyositis--helped with scalp symptoms/rash, but now it's returning   Current treatments:   -Clobetasol ointment BID PRN  Effect of current treatment: Not controlled  Other history:  Seen by Dr. Christy Sharma initially in 2020 for photoexposed eruption on chest, arms, back, thighs + itchy scalp rash. Scalp rash at the time was refractory to topical CS  Since 2020 Also seeing Dr. Macarnea Balbuena for her antisynthetase syndrome/polymyositis treated previously with prednisone and imuran-->switched to cellcept, Aza, pred  3/2020: Both biopsies from hand and hip showed no evidence of VID. Showed eosinophils on hands and evidence of \"dermatitis\". 6/2020: Saw Dr. Onel Flores again for severe burning, itching flaking of scalp despite using clobetasol ointment. She favored all skin involvement including scalp was DM despite biopsy findings not showing VID. She tried to start Theadore Angry as it has been shown to help with DM on scalp  7/2020: Started on Theadore Angry with nausea, diarrhea, headaches (per pt, she only completed the starter pack and it was never approved by insurance to continue further). Pt deferred starting Plaquenil due to side effects  12/2020: Stopped prednisone and only taking Cellcept at the time   3/2021:  Audrey started plaqnieul 200mg BID   5/2021: Pt's muscle disease and ILD was progressing so she was started on Rituximab (5/7/2021, and 6/11/2021. 12/7/2021, Dec 12/2021, 7/2022) and IVIG (May 26- 28,  July, Aug, sept, Oct,  Nov 2021) which coincided with great improvement in scalp sx per patient. Pt sent message to Dr. Claritza Wheat in 2021 saying she wasn't interested in [facility] since scalp was better on these treatments)  8/2022: Recently had Rituxan in 7/2022, continued on Cellcept 1500mg daily, prednisone 5mg day   *Personal history of skin cancer: None  *Family history of skin cancer: None     Review of Systems:  Gen: Feels well, good sense of health. Skin: No new or changing moles, no history of keloids or hypertrophic scars. Past Medical History, Family History, Surgical History, Medications and Allergies reviewed. Past Medical History:   Diagnosis Date    Allergic rhinitis     Arthritis     Asthma     Chronic sinusitis     Dermatitis     Hypothyroidism     Polymyositis ([address]     Senile osteoporosis 6/17/2021     Past Surgical History:   Procedure Laterality Date    CARPAL TUNNEL RELEASE Right 9/11/2019    RIGHT CARPAL TUNNEL RELEASE performed by Dora Swartz MD at [address] [address] Left 12/27/2019    LEFT CARPAL TUNNEL RELEASE performed by Dora Swartz MD at [address] [address]    SKIN BIOPSY      TONSILLECTOMY      WISDOM TOOTH EXTRACTION         Allergies   Allergen Reactions    Wasp Venom Other (See Comments)     Outpatient Medications Marked as Taking for the 9/7/22 encounter (Office Visit) with Corry Thomas MD   Medication Sig Dispense Refill    clobetasol (TEMOVATE) 0.05 % external solution Apply to itchy areas of scalp for 2 weeks or until improved. 50 mL 2    predniSONE (DELTASONE) 2.5 MG tablet Take 1 tablet by mouth in the morning.  90 tablet 0    mycophenolate (CELLCEPT) 500 MG tablet TAKE 3 TABLETS BY MOUTH  TWICE DAILY 540 tablet 0 sulfamethoxazole-trimethoprim (BACTRIM DS) 800-160 MG per tablet Take 1 tab three times a week 36 tablet 2    sulfamethoxazole-trimethoprim (BACTRIM) 400-80 MG per tablet Take 1 tab three times a week 36 tablet 0    levothyroxine (SYNTHROID) 100 MCG tablet TAKE 1 TABLET BY MOUTH  DAILY 90 tablet 3    vitamin D (ERGOCALCIFEROL) 1.25 MG (23225 UT) CAPS capsule TAKE 1 CAPSULE BY MOUTH  ONCE WEEKLY 13 capsule 3    predniSONE (DELTASONE) 5 MG tablet Take 2 tab po daily x 15 days, take1.5 mg po daily x 15 days then take 1 tab po daily. 120 tablet 1    Lactobacillus (PROBIOTIC ACIDOPHILUS) CAPS Take one capsule once daily 30 capsule 5    ibuprofen (ADVIL;MOTRIN) 600 MG tablet Take 1 tablet by mouth every 6 hours as needed for Pain 60 tablet 1    ALBUTEROL SULFATE IN Inhale into the lungs as needed      ergocalciferol (ERGOCALCIFEROL) 64643 UNIT capsule Take 1 capsule by mouth once a week for 120 days. Weekly for four months 4 capsule 4         Physical Examination   No acute distress. Mood clear/affect appropriate. Alert and oriented. Mucous membranes moist.  Sclera anicteric. Full body skin exam was conducted to include the scalp, face, lips, lids/conjunctiva, ears, neck, chest, abdomen, back, right and left hands and forearms, right and left leg and feet and was normal with the following exceptions:   - Pink plaques with thick white  scale on occipital scalp  - Pink patches with finer scale on bilateral frontotemporal scalp  - Thin pink scaly plaques on L forearm, R hip, upper back > dorsal hands       Assessment and Plan     1. Rash and other nonspecific skin eruption, scalp/body--not controlled  -Pt has complicated rash history in setting of known anti-synthetase/polymyositis.  Prior biopsies of rash on hands and hip not showing typical findings of DM, but instead showing rare eos and other signs pointing to possible eczematous/ACD eruption   -Reviewed with patient that cause of her scalp eruption/pruritus not entirely clear. Joelhiram Sox that most consistent with dermatomyositis, although patient says she has been told it's PSO   -Reviewed that scalp biopsy could further delineate the exact cause and r/o ACD as a potential player (given that she uses numerous different shampoos, hair dyes etc that could play a role if this biopsy shows what prior biopsies showed). Also reviewed that if it shows findings c/w dermatomyositis, then she may benefit from redosing IVIG which she says her scalp responded to in the past or re-trying for Ulysses Belt which has been reported to help with DM of scalp  -Recommend:  -RTC when able for scalp biopsy  -Avoid placing topical steroids onto scalp until biopsy  -After biopsy, start clobetasol (TEMOVATE) 0.05 % external solution; Apply to itchy areas of scalp for 2 weeks or until improved. Dispense: 50 mL; Refill: 2     RTC for scalp biopsy     Note is transcribed using voice recognition software. Inadvertent computerized transcription errors may be present.     MD Dr. Glendy Gorman spent > 45 minutes with direct pt contact explaining diagnosis, treatment options, and next steps

## 2022-09-21 ENCOUNTER — OFFICE VISIT (OUTPATIENT)
Dept: DERMATOLOGY | Age: 57
End: 2022-09-21
Payer: COMMERCIAL

## 2022-09-21 DIAGNOSIS — R21 RASH AND OTHER NONSPECIFIC SKIN ERUPTION: Primary | ICD-10-CM

## 2022-09-21 PROCEDURE — 11104 PUNCH BX SKIN SINGLE LESION: CPT | Performed by: INTERNAL MEDICINE

## 2022-09-21 NOTE — PATIENT INSTRUCTIONS
Thank you for visiting [facility] today! Please follow the instructions below as we discussed in clinic:      I will call you with biopsy results in 7-10 days  Remove your stitches in 10-14 days    Biopsy Wound Care Instructions  Cleanse the wound with mild soapy water daily. Gently dry the area. Apply Vaseline or petroleum jelly to the wound using a cotton tipped applicator. Cover with a clean bandage. Repeat this process until the biopsy site is healed. If you had stitches placed, continue treating the site until the stitches are removed. Remember to make an appointment to return to have your stitches removed by our staff. You may shower and bathe as usual.   ** Biopsy results generally take around 7 business days to come back. If you have not heard from us by then, please call the office at [phone] between 8AM and 4PM Monday through Friday.

## 2022-09-21 NOTE — PROGRESS NOTES
[facility] Dermat[facility]  Sterling Bentley MD  [phone]    Date of Visit: 9/21/2022  LV: Aug 2022    PMH: dermatomyositis/antisynthetase syndrome     Pino Ochoa is a 64 y.o. female who presents for scalp biopsy    Chief Complaint:   Chief Complaint   Patient presents with    Lesion(s)    Rash     On scalp- here for biopsy        History of Present Illness:    Concern:  Rash on scalp here for scalp biopsy  Location: Worse in scalp  Duration:  Many years; pt thinks she's had some kind of scaly rash on scalp since childhood, but not sure if it's the same exact rash now  Symptoms: Very itchy with flares  Previous treatments:    -Prednisone  -Cellcept  -Otezla-diarrhea, headaches  -Rituximab + IVIG for dermatomyositis/polymyositis--helped with scalp symptoms/rash, but now it's returning   Current treatments:   -Clobetasol ointment BID PRN--held it for 2 weeks prior to biopsy  Effect of current treatment: Not controlled  Other history:  Seen by Dr. Dl Gauthier initially in 2020 for photoexposed eruption on chest, arms, back, thighs + itchy scalp rash. Scalp rash at the time was refractory to topical CS  Since 2020 Also seeing Dr. Tammy Kerns for her antisynthetase syndrome/polymyositis treated previously with prednisone and imuran-->switched to cellcept, Aza, pred  3/2020: Both biopsies from hand and hip showed no evidence of VID. Showed eosinophils on hands and evidence of \"dermatitis\". 6/2020: Saw Dr. Marlen Jeter again for severe burning, itching flaking of scalp despite using clobetasol ointment. She favored all skin involvement including scalp was DM despite biopsy findings not showing VID. She tried to start Christa Deerfield as it has been shown to help with DM on scalp  7/2020: Started on Christa Deerfield with nausea, diarrhea, headaches (per pt, she only completed the starter pack and it was never approved by insurance to continue further).  Pt deferred starting Plaquenil due to side effects  12/2020: Stopped prednisone and only taking Cellcept at the time   3/2021: Aranza Harrison started plaqnieul 200mg BID   5/2021: Pt's muscle disease and ILD was progressing so she was started on Rituximab (5/7/2021, and 6/11/2021. 12/7/2021, Dec 12/2021, 7/2022) and IVIG (May 26- 28,  July, Aug, sept, Oct,  Nov 2021) which coincided with great improvement in scalp sx per patient. Pt sent message to Dr. Clyde Pepe in 2021 saying she wasn't interested in [facility] since scalp was better on these treatments)  8/2022: Recently had Rituxan in 7/2022, continued on Cellcept 1500mg daily, prednisone 5mg day   *Personal history of skin cancer: None  *Family history of skin cancer: None     Review of Systems:  Gen: Feels well, good sense of health. Skin: No new or changing moles, no history of keloids or hypertrophic scars. Past Medical History, Family History, Surgical History, Medications and Allergies reviewed. Past Medical History:   Diagnosis Date    Allergic rhinitis     Arthritis     Asthma     Chronic sinusitis     Dermatitis     Hypothyroidism     [address] [address]     Senile osteoporosis 6/17/2021     Past Surgical History:   Procedure Laterality Date    CARPAL TUNNEL RELEASE Right 9/11/2019    RIGHT CARPAL TUNNEL RELEASE performed by Julieta Gillespie MD at [address] [place] [address] Left 12/27/2019    LEFT CARPAL TUNNEL RELEASE performed by Julieta Gillespie MD at [address] [place] [address]    SKIN BIOPSY      TONSILLECTOMY      WISDOM TOOTH EXTRACTION         Allergies   Allergen Reactions    Wasp Venom Other (See Comments)     Outpatient Medications Marked as Taking for the 9/21/22 encounter (Office Visit) with Saniya Justice MD   Medication Sig Dispense Refill    clobetasol (TEMOVATE) 0.05 % external solution Apply to itchy areas of scalp for 2 weeks or until improved. 50 mL 2    predniSONE (DELTASONE) 2.5 MG tablet Take 1 tablet by mouth in the morning.  90 tablet 0    mycophenolate (CELLCEPT) 500 MG tablet TAKE 3 TABLETS BY MOUTH  TWICE DAILY 540 tablet 0    sulfamethoxazole-trimethoprim (BACTRIM DS) 800-160 MG per tablet Take 1 tab three times a week 36 tablet 2    sulfamethoxazole-trimethoprim (BACTRIM) 400-80 MG per tablet Take 1 tab three times a week 36 tablet 0    levothyroxine (SYNTHROID) 100 MCG tablet TAKE 1 TABLET BY MOUTH  DAILY 90 tablet 3    predniSONE (DELTASONE) 5 MG tablet Take 2 tab po daily x 15 days, take1.5 mg po daily x 15 days then take 1 tab po daily. 120 tablet 1    Lactobacillus (PROBIOTIC ACIDOPHILUS) CAPS Take one capsule once daily 30 capsule 5    ibuprofen (ADVIL;MOTRIN) 600 MG tablet Take 1 tablet by mouth every 6 hours as needed for Pain 60 tablet 1         Physical Examination   No acute distress. Mood clear/affect appropriate. Alert and oriented. Mucous membranes moist.  Sclera anicteric. Visible skin exam was conducted to include the scalp, face, lips/teeth, lids/conjunctiva, ears, neck, right and left hands and forearms and was normal with the following exceptions:   - Pink plaques with thick white scale on occipital scalp  - Pink patches with finer scale on bilateral frontotemporal scalp  - Thin pink scaly plaques on L forearm, R hip, upper back > dorsal hands     Assessment and Plan     1. Rash and other nonspecific skin eruption, scalp/body--not controlled  -Pt has complicated rash history in setting of known anti-synthetase/polymyositis. Prior biopsies of rash on hands and hip not showing typical findings of DM, but instead showing rare eos and other signs pointing to possible eczematous/ACD eruption   -Reviewed with patient that cause of her scalp eruption/pruritus not entirely clear. Earma Montaño that most consistent with dermatomyositis, although patient says she has been told it's PSO and eczema  -Here for scalp biopsy today.   After informed written consent was obtained, using Betadine for cleansing and 1% Lidocaine with epinephrine for anesthetic, with sterile technique a 4 mm punch biopsy was used to obtain a biopsy specimen of the lesion. Hemostasis was obtained by pressure and wound was prolene 4-0 sutured. Antibiotic dressing is applied, and wound care instructions provided. Be alert for any signs of cutaneous infection. The specimen is labeled and sent to pathology for evaluation. The procedure was well tolerated without complications.  -Start clobetasol solution BID PRN for scalp    -Future consideration: IVIG for scalp, otezla     Follow up based on bx results    Note is transcribed using voice recognition software. Inadvertent computerized transcription errors may be present.     Marixa Gaona MD

## 2022-09-23 LAB — DERMATOLOGY PATHOLOGY REPORT: NORMAL

## 2022-10-24 ENCOUNTER — TELEPHONE (OUTPATIENT)
Dept: FAMILY MEDICINE CLINIC | Age: 57
End: 2022-10-24

## 2022-10-24 NOTE — TELEPHONE ENCOUNTER
Pt tested positive for COVID today. Pt has body aches and a persistent cough. She is immunocompromised and is interested in taking paxlovid. Please call pt back if script ca be called in.

## 2022-10-25 ENCOUNTER — TELEMEDICINE (OUTPATIENT)
Dept: FAMILY MEDICINE CLINIC | Age: 57
End: 2022-10-25
Payer: COMMERCIAL

## 2022-10-25 DIAGNOSIS — U07.1 COVID-19: Primary | ICD-10-CM

## 2022-10-25 PROCEDURE — 99213 OFFICE O/P EST LOW 20 MIN: CPT | Performed by: NURSE PRACTITIONER

## 2022-10-25 PROCEDURE — 1036F TOBACCO NON-USER: CPT | Performed by: NURSE PRACTITIONER

## 2022-10-25 PROCEDURE — G8427 DOCREV CUR MEDS BY ELIG CLIN: HCPCS | Performed by: NURSE PRACTITIONER

## 2022-10-25 PROCEDURE — G8419 CALC BMI OUT NRM PARAM NOF/U: HCPCS | Performed by: NURSE PRACTITIONER

## 2022-10-25 PROCEDURE — 3017F COLORECTAL CA SCREEN DOC REV: CPT | Performed by: NURSE PRACTITIONER

## 2022-10-25 PROCEDURE — G8484 FLU IMMUNIZE NO ADMIN: HCPCS | Performed by: NURSE PRACTITIONER

## 2022-10-25 NOTE — PROGRESS NOTES
10/25/2022    TELEHEALTH EVALUATION -- Audio/Visual (During YTTIS-53 public health emergency)    HPI:    Linda Hays (:  1965) has requested an audio/video evaluation for the following concern(s):    Covid positive yesterday. Sx started - back pain, HA, weakness. Spouse positive Saturday. Temp 102 yesterday- chills last night but down to 98 this morning. Pain is now gone, no headache, nose is running and coughing up phlegm. Has h/o ILD- wants to know if Paxlovid is necessary since shes markedly improved    Review of Systems   All other systems reviewed and are negative. Prior to Visit Medications    Medication Sig Taking? Authorizing Provider   clobetasol (TEMOVATE) 0.05 % external solution Apply to itchy areas of scalp for 2 weeks or until improved. Yes Nelson Manzanares MD   predniSONE (DELTASONE) 2.5 MG tablet Take 1 tablet by mouth in the morning.  Yes Doin Diaz MD   mycophenolate (CELLCEPT) 500 MG tablet TAKE 3 TABLETS BY MOUTH  TWICE DAILY Yes Neeta Albarran MD   sulfamethoxazole-trimethoprim (BACTRIM DS) 800-160 MG per tablet Take 1 tab three times a week Yes Dion Diaz MD   levothyroxine (SYNTHROID) 100 MCG tablet TAKE 1 TABLET BY MOUTH  DAILY Yes Trinity Orr APRN - CNP   ibuprofen (ADVIL;MOTRIN) 600 MG tablet Take 1 tablet by mouth every 6 hours as needed for Pain Yes Wally Jensen MD       Social History     Tobacco Use    Smoking status: Never     Passive exposure: Yes    Smokeless tobacco: Never   Vaping Use    Vaping Use: Never used   Substance Use Topics    Alcohol use: No     Alcohol/week: 0.0 standard drinks    Drug use: No        Past Medical History:   Diagnosis Date    Allergic rhinitis     Arthritis     Asthma     Chronic sinusitis     Dermatitis     Hypothyroidism     Polymy[address]itis ([facility] [address]     Senile osteoporosis 2021       Past Surgical History:   Procedure Laterality Date    CARPAL TUNNEL RELEASE Right 2019    RIGHT CARPAL TUNNEL RELEASE performed by Olga Mckinley MD at [address] Left 12/27/2019    LEFT CARPAL TUNNEL RELEASE performed by Olga Mckinley MD at [address] [place] [address]  2012    SKIN BIOPSY      TONSILLECTOMY      WISDOM TOOTH EXTRACTION         PHYSICAL EXAMINATION:  [ INSTRUCTIONS:  \"[x]\" Indicates a positive item  \"[]\" Indicates a negative item  -- DELETE ALL ITEMS NOT EXAMINED]  Vital Signs: (As obtained by patient/caregiver or practitioner observation)    Blood pressure-  Heart rate-    Respiratory rate-    Temperature-  Pulse oximetry-     Constitutional: [x] Appears well-developed and well-nourished [x] No apparent distress      [] Abnormal-   Mental status  [x] Alert and awake  [x] Oriented to person/place/time [x]Able to follow commands      Eyes:  EOM    [x]  Normal  [] Abnormal-  Sclera  [x]  Normal  [] Abnormal -         Discharge [x]  None visible  [] Abnormal -    HENT:   [x] Normocephalic, atraumatic. [] Abnormal   [] Mouth/Throat: Mucous membranes are moist.     External Ears [] Normal  [] Abnormal-     Neck: [x] No visualized mass     Pulmonary/Chest: [x] Respiratory effort normal.  [x] No visualized signs of difficulty breathing or respiratory distress        [] Abnormal-      Musculoskeletal:   [] Normal gait with no signs of ataxia         [] Normal range of motion of neck        [] Abnormal-       Neurological:        [x] No Facial Asymmetry (Cranial nerve 7 motor function) (limited exam to video visit)          [x] No gaze palsy        [] Abnormal-         Skin:        [x] No significant exanthematous lesions or discoloration noted on facial skin         [] Abnormal-            Psychiatric:       [x] Normal Affect [x] No Hallucinations        [] Abnormal-     Other pertinent observable physical exam findings-     ASSESSMENT/PLAN:   Diagnosis Orders   1. COVID-19          COVID-19  Discussed Paxlovid with patient and she declines need at this time.   She is improving daily and already feels much better. She will call office for any worsening, SOB, fever return or concern    No follow-ups on file. Tenisha Hutchinson is a 62 y.o. female being evaluated by a Virtual Visit (video visit) encounter to address concerns as mentioned above. A caregiver was present when appropriate. Due to this being a TeleHealth encounter (During TIBDJ-53 public health emergency), evaluation of the following organ systems was limited: Vitals/Constitutional/EENT/Resp/CV/GI//MS/Neuro/Skin/Heme-Lymph-Imm. Pursuant to the emergency declaration under the [address] [place] [address], [address] [place] [address] authority and the [employer] Act, this Virtual Visit was conducted with patient's (and/or legal guardian's) consent, to reduce the patient's risk of exposure to COVID-19 and provide necessary medical care. The patient (and/or legal guardian) has also been advised to contact this office for worsening conditions or problems, and seek emergency medical treatment and/or call 911 if deemed necessary. Patient identification was verified at the start of the visit: yes    Total time spent on this encounter: not billed by time    Services were provided through a video synchronous discussion virtually to substitute for in-person clinic visit. Patient and provider were located at their individual homes. --ERGINA Griffith CNP on 10/25/2022 at 2:12 PM    An electronic signature was used to authenticate this note.

## 2022-10-25 NOTE — ASSESSMENT & PLAN NOTE
Discussed Paxlovid with patient and she declines need at this time. She is improving daily and already feels much better.   She will call office for any worsening, SOB, fever return or concern

## 2022-10-31 ENCOUNTER — OFFICE VISIT (OUTPATIENT)
Dept: RHEUMATOLOGY | Age: 57
End: 2022-10-31
Payer: COMMERCIAL

## 2022-10-31 ENCOUNTER — HOSPITAL ENCOUNTER (OUTPATIENT)
Dept: GENERAL RADIOLOGY | Age: 57
Discharge: HOME | End: 2022-10-31
Payer: COMMERCIAL

## 2022-10-31 VITALS
DIASTOLIC BLOOD PRESSURE: 78 MMHG | BODY MASS INDEX: 28.47 KG/M2 | WEIGHT: 145 LBS | HEIGHT: 60 IN | SYSTOLIC BLOOD PRESSURE: 120 MMHG

## 2022-10-31 DIAGNOSIS — J84.9 ILD (INTERSTITIAL LUNG DISEASE) (HCC): ICD-10-CM

## 2022-10-31 DIAGNOSIS — R05.1 ACUTE COUGH: ICD-10-CM

## 2022-10-31 DIAGNOSIS — D89.89 ANTISYNTHETASE SYNDROME (HCC): Primary | ICD-10-CM

## 2022-10-31 DIAGNOSIS — M19.90 INFLAMMATORY ARTHRITIS: ICD-10-CM

## 2022-10-31 DIAGNOSIS — Z79.899 HIGH RISK MEDICATION USE: ICD-10-CM

## 2022-10-31 LAB
A/G RATIO: 1.9 (ref 1.1–2.2)
ALBUMIN SERPL-MCNC: 4 G/DL (ref 3.4–5)
ALP BLD-CCNC: 77 U/L (ref 40–129)
ALT SERPL-CCNC: 43 U/L (ref 10–40)
ANION GAP SERPL CALCULATED.3IONS-SCNC: 10 MMOL/L (ref 3–16)
AST SERPL-CCNC: 27 U/L (ref 15–37)
BASOPHILS ABSOLUTE: 0 K/UL (ref 0–0.2)
BASOPHILS RELATIVE PERCENT: 0.3 %
BILIRUB SERPL-MCNC: 0.3 MG/DL (ref 0–1)
BUN BLDV-MCNC: 11 MG/DL (ref 7–20)
C-REACTIVE PROTEIN: 6.8 MG/L (ref 0–5.1)
CALCIUM SERPL-MCNC: 8.8 MG/DL (ref 8.3–10.6)
CHLORIDE BLD-SCNC: 105 MMOL/L (ref 99–110)
CO2: 25 MMOL/L (ref 21–32)
CREAT SERPL-MCNC: 0.6 MG/DL (ref 0.6–1.1)
EOSINOPHILS ABSOLUTE: 0 K/UL (ref 0–0.6)
EOSINOPHILS RELATIVE PERCENT: 0.9 %
GFR SERPL CREATININE-BSD FRML MDRD: >60 ML/MIN/{1.73_M2}
GLUCOSE BLD-MCNC: 89 MG/DL (ref 70–99)
HCT VFR BLD CALC: 40.3 % (ref 36–48)
HEMOGLOBIN: 13.3 G/DL (ref 12–16)
LYMPHOCYTES ABSOLUTE: 0.7 K/UL (ref 1–5.1)
LYMPHOCYTES RELATIVE PERCENT: 22.4 %
MCH RBC QN AUTO: 29.1 PG (ref 26–34)
MCHC RBC AUTO-ENTMCNC: 33.1 G/DL (ref 31–36)
MCV RBC AUTO: 87.9 FL (ref 80–100)
MONOCYTES ABSOLUTE: 0.5 K/UL (ref 0–1.3)
MONOCYTES RELATIVE PERCENT: 13.7 %
NEUTROPHILS ABSOLUTE: 2.1 K/UL (ref 1.7–7.7)
NEUTROPHILS RELATIVE PERCENT: 62.7 %
PDW BLD-RTO: 14.3 % (ref 12.4–15.4)
PLATELET # BLD: 226 K/UL (ref 135–450)
PMV BLD AUTO: 8.7 FL (ref 5–10.5)
POTASSIUM SERPL-SCNC: 3.5 MMOL/L (ref 3.5–5.1)
RBC # BLD: 4.58 M/UL (ref 4–5.2)
SEDIMENTATION RATE, ERYTHROCYTE: 22 MM/HR (ref 0–30)
SODIUM BLD-SCNC: 140 MMOL/L (ref 136–145)
TOTAL CK: 130 U/L (ref 26–192)
TOTAL PROTEIN: 6.1 G/DL (ref 6.4–8.2)
WBC # BLD: 3.3 K/UL (ref 4–11)

## 2022-10-31 PROCEDURE — G8419 CALC BMI OUT NRM PARAM NOF/U: HCPCS | Performed by: INTERNAL MEDICINE

## 2022-10-31 PROCEDURE — 36415 COLL VENOUS BLD VENIPUNCTURE: CPT | Performed by: INTERNAL MEDICINE

## 2022-10-31 PROCEDURE — 71046 X-RAY EXAM CHEST 2 VIEWS: CPT

## 2022-10-31 PROCEDURE — G8484 FLU IMMUNIZE NO ADMIN: HCPCS | Performed by: INTERNAL MEDICINE

## 2022-10-31 PROCEDURE — 1036F TOBACCO NON-USER: CPT | Performed by: INTERNAL MEDICINE

## 2022-10-31 PROCEDURE — 99214 OFFICE O/P EST MOD 30 MIN: CPT | Performed by: INTERNAL MEDICINE

## 2022-10-31 PROCEDURE — 3017F COLORECTAL CA SCREEN DOC REV: CPT | Performed by: INTERNAL MEDICINE

## 2022-10-31 PROCEDURE — G8427 DOCREV CUR MEDS BY ELIG CLIN: HCPCS | Performed by: INTERNAL MEDICINE

## 2022-10-31 NOTE — PROGRESS NOTES
[address], [place]                                                                                                                          (B) [phone] (F)    Note is transcribed using voice recognition software. Inadvertent computerized transcription errors may be present. Patient identification: Cassie Hays,: [id number],28 y.o. Sex: female     A/P  Linda was seen today for follow-up. 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 A/P from Today's visit-    1. Polymyositis-anti-Deidra syndrome with  ILD-   Worsening cough, exertional shortness of breath-positive for COVID last week. Chest x-ray from today-suspected atypical pneumonia. Advised patient to see primary care physician. Advised patient to check pulse ox at home. Polymyositis-stable on current immunosuppression-rituximab and CellCept. No musculoskeletal symptoms or respiratory concerns or complaints prior to COVID infection.   PFT from 2022-normal.  Inflammatory arthritis is in remission.    -Last rituximab infusions in 2022, is on CellCept 1500 mg a day and prednisone 2.5 mg a day along with Bactrim prophylaxis. Continue current regimen. Check immunoglobulins in early December, prior filling orders for rituximab. Current/previous therapy-  Started rituximab 5/2021. Has been getting every 6 monthly as RA protocol. IV IG- May 26- 28,  July, Aug, sept, Oct,  Nov 2021 at [facility]. PFT:  4/9/21- PFT-FVC 60% predicted, TLC 79% predicted, DLCO 54% predicted. 8/6/21     FVC 75% pred, TLC 80% predicted, DLCO 72% pred  2/3/2022-normal PFT. 6-minute walk-desaturated to 90% but remained asymptomatic.    2.-Inflammatory arthritis asymptomatic. 3.  Scalp rash-dermatology note reviewed-likely DM rash. She is trying topical clobetasol, IVIG versus Otezla as planned if topical therapy is ineffective. 4.. Bone health -no fragility fractures. Stable. 4/24/2021 lumbar T score -2, left hip -1.6, femoral neck -1.6. Right hip -1.7, femoral neck -1.3    9/20/2019 lumbar T score -1.8, left femoral neck -1.7. Reclast 6/22/2021. continue calcium and vitamin D supplementation. Plan to redose Reclast in 2023 as prophylactic therapy. She is aware of myositis emergencies. RTC  6 weeks. Patient indicates understanding and agrees with the management plan. I reviewed patient's history, referral documents and electronic medical records. #######################################################################    Subjective-  Follow-up for polymyositis, inflammatory polyarthritis and osteopenia. Interval changes-  States that she had high-grade fever, URI symptoms, tested positive for COVID infection last week. Multiple other family members also had [place]. Fever broke mid last week, then she started having cough, now cough is persistent, associated with a lot of phlegm/mucus. She is coughing constantly, and that is hurting her rib cage. She also reports exertional shortness of breath.   She has not checked pulse oximeter at home, although has 1 at home. Her ADLs are normal.  She continues to have rash in the scalp, noticed dry spot on her index fingers. No other dermatomyositis rash. Denies muscle weakness other than myalgia from COVID infection. Overall feels tired, achy, exhausted from COVID infection. Prior to that, she is very active physically, used to exercise regularly. She is currently taking CellCept 1500 mg a day, prednisone 2.5 mg a day and had rituximab in July. All other review of systems are negative. No family history of autoimmune diseases    Current Outpatient Medications   Medication Sig Dispense Refill    clobetasol (TEMOVATE) 0.05 % external solution Apply to itchy areas of scalp for 2 weeks or until improved. 50 mL 2    predniSONE (DELTASONE) 2.5 MG tablet Take 1 tablet by mouth in the morning. 90 tablet 0    mycophenolate (CELLCEPT) 500 MG tablet TAKE 3 TABLETS BY MOUTH  TWICE DAILY 540 tablet 0    sulfamethoxazole-trimethoprim (BACTRIM DS) 800-160 MG per tablet Take 1 tab three times a week 36 tablet 2    levothyroxine (SYNTHROID) 100 MCG tablet TAKE 1 TABLET BY MOUTH  DAILY 90 tablet 3    ibuprofen (ADVIL;MOTRIN) 600 MG tablet Take 1 tablet by mouth every 6 hours as needed for Pain 60 tablet 1     No current facility-administered medications for this visit. Allergies   Allergen Reactions    Wasp Venom Other (See Comments)       PHYSICAL EXAM:    Vitals:    /78   Ht 5' (1.524 m)   Wt 145 lb (65.8 kg)   LMP 09/12/2013   BMI 28.32 kg/m²   General appearance/ Psychiatric: well nourished, and well groomed, normal judgement, alert, appears stated age and cooperative. MKS: No appreciable tender, swollen, inflamed joints in upper or lower extremities. Normal range of motion in peripheral joints in upper and lower extremities. Normal gait and muscle strength in upper and lower extremities. Chest-scattered basilar crackles bilaterally. Patient did not have baseline crackles in her lungs.   S1-S2 regular no added sounds. Scalp-erythema and scaly rash diffuse in his scalp mainly in her hairline anterior and posterior. Dry spot on the index finger bilaterally. DATA:   Lab Results   Component Value Date    WBC 6.5 07/23/2022    HGB 13.6 07/23/2022    HCT 41.3 07/23/2022    MCV 90.3 07/23/2022     07/23/2022         Chemistry        Component Value Date/Time     06/02/2022 0821    K 4.4 06/02/2022 0821     06/02/2022 0821    CO2 23 06/02/2022 0821    BUN 11 06/02/2022 0821    CREATININE 0.5 (L) 08/01/2022 0808        Component Value Date/Time    CALCIUM 9.2 06/02/2022 0821    ALKPHOS 69 06/02/2022 0821    AST 13 (L) 08/01/2022 0808    ALT 7 (L) 08/01/2022 0808    BILITOT <0.2 06/02/2022 0821          No results found for: [facility]  Lab Results   Component Value Date    SEDRATE 20 03/12/2022     Lab Results   Component Value Date    CRP 6.4 (H) 08/01/2022     Lab Results   Component Value Date    MICHELLE POSITIVE (A) 06/12/2019    SEDRATE 20 03/12/2022     Lab Results   Component Value Date    CKTOTAL 44 08/01/2022     Lab Results   Component Value Date    TSH 2.40 07/26/2018     Lab Results   Component Value Date    VITD25 31.3 07/23/2022         Radiology Review:     CT chest 5/2021-lower lobe predominant reticular linear opacities with honeycombing and traction bronchiectasis consistent with UIP. Total time 36 minutes- reviewing medical records & pre charting on the same day of visit,  history taking, exam, explaining medical diagnosis, management plan, ordering labs, filling medications, communicating findings with other providers and medical documentation in EHR.

## 2022-11-01 NOTE — RESULT ENCOUNTER NOTE
Discussed with patient she is not having any symptoms other than a cough at this time overall feeling well no further treatment indicated we will continue to monitor if symptoms do get worse or not continue to improve can get further evaluate at that time.

## 2022-11-02 LAB — ALDOLASE: 4.8 U/L (ref 1.2–7.6)

## 2022-11-04 RX ORDER — MYCOPHENOLATE MOFETIL 500 MG/1
TABLET ORAL
Qty: 540 TABLET | Refills: 3 | OUTPATIENT
Start: 2022-11-04

## 2022-11-15 ENCOUNTER — OFFICE VISIT (OUTPATIENT)
Dept: DERMATOLOGY | Age: 57
End: 2022-11-15
Payer: COMMERCIAL

## 2022-11-15 DIAGNOSIS — L20.89 OTHER ATOPIC DERMATITIS: Primary | ICD-10-CM

## 2022-11-15 PROCEDURE — 3017F COLORECTAL CA SCREEN DOC REV: CPT | Performed by: INTERNAL MEDICINE

## 2022-11-15 PROCEDURE — 1036F TOBACCO NON-USER: CPT | Performed by: INTERNAL MEDICINE

## 2022-11-15 PROCEDURE — G8427 DOCREV CUR MEDS BY ELIG CLIN: HCPCS | Performed by: INTERNAL MEDICINE

## 2022-11-15 PROCEDURE — 99214 OFFICE O/P EST MOD 30 MIN: CPT | Performed by: INTERNAL MEDICINE

## 2022-11-15 PROCEDURE — G8484 FLU IMMUNIZE NO ADMIN: HCPCS | Performed by: INTERNAL MEDICINE

## 2022-11-15 PROCEDURE — G8419 CALC BMI OUT NRM PARAM NOF/U: HCPCS | Performed by: INTERNAL MEDICINE

## 2022-11-15 RX ORDER — PREDNISONE 2.5 MG
2.5 TABLET ORAL DAILY
Qty: 90 TABLET | Refills: 0 | Status: SHIPPED | OUTPATIENT
Start: 2022-11-15

## 2022-11-15 NOTE — PROGRESS NOTES
[facility]  Yessica Olivera MD  [phone]    Date of Visit: 11/15/2022  LV: 9/2022    PMH: dermatomyositis/antisynthetase syndrome     Kelsie Padilla is a 62 y.o. female who presents for follow up of scalp rash. Chief Complaint:   Chief Complaint   Patient presents with    Follow-up     Follow up eczema scalp       History of Present Illness:    Concern:  Rash on scalp with biopsy c/w atopic derm vs. ICD vs. Nummular dermatitis  Location: Worse in scalp  Duration:  Many years; pt thinks she's had some kind of scaly rash on scalp since childhood, but not sure if it's the same exact rash now  Symptoms: Very itchy with flares  Previous treatments:    -Prednisone  -Otezla x 1 months--stopped 2/2 diarrhea, headaches and insurance not approving   -Rituximab + IVIG for dermatomyositis/ polymyositis--helped with scalp symptoms/rash, but now it's returning. Felt IVIG portion helped most  Current treatments:   -Clobetasol ointment BID PRN--held it for 2 weeks prior to biopsy  -Cellcept 1500mg BID for dermatomyositis  -Rituximab q6 months (due in Dec or Jan upcoming)  -Prednisone 2.5mg daily   -Switched shampoo to one from my list; recently bleached/colored hair   Effect of current treatment: Improved from prior, but still active/itchy mostly on scalp   Other history:  Seen by Dr. Rm Puente initially in 2020 for photoexposed eruption on chest, arms, back, thighs + itchy scalp rash. Scalp rash at the time was refractory to topical CS  Since 2020 Also seeing Dr. Priscilla Geller for her antisynthetase syndrome/polymyositis treated previously with prednisone and imuran-->switched to cellcept, Aza, pred  3/2020: Both biopsies from hand and hip showed no evidence of VID. Showed eosinophils on hands and evidence of \"dermatitis\". 6/2020: Saw Dr. Regan Hence again for severe burning, itching flaking of scalp despite using clobetasol ointment.  She favored all skin involvement including scalp was DM despite biopsy findings not showing VID. She tried to start Carlene Border as it has been shown to help with DM on scalp  7/2020: Started on Carlene Border with nausea, diarrhea, headaches (per pt, she only completed the starter pack and it was never approved by insurance to continue further). Pt deferred starting Plaquenil due to side effects  12/2020: Stopped prednisone and only taking Cellcept at the time   3/2021: Jd Kirk started plaquenil 200mg BID   5/2021: Pt's muscle disease and ILD was progressing so she was started on Rituximab (5/7/2021, and 6/11/2021. 12/7/2021, Dec 12/2021, 7/2022) and IVIG (May 26- 28,  July, Aug, sept, Oct,  Nov 2021) which coincided with great improvement in scalp sx per patient. Pt sent message to Dr. Aron Cleaning in 2021 saying she wasn't interested in Carlene Border since scalp was better on these treatments)  8/2022: Recently had Rituxan in 7/2022, continued on Cellcept 1500mg daily, prednisone 5mg day     *Personal history of skin cancer: None  *Family history of skin cancer: None     Review of Systems:  Gen: Feels well, good sense of health. Skin: No new or changing moles, no history of keloids or hypertrophic scars. Past Medical History, Family History, Surgical History, Medications and Allergies reviewed.     Past Medical History:   Diagnosis Date    Allergic rhinitis     Arthritis     Asthma     Chronic sinusitis     Dermatitis     Hypothyroidism     Polymyositis ([facility] [address] [address]     Senile osteoporosis 6/17/2021     Past Surgical History:   Procedure Laterality Date    CARPAL TUNNEL RELEASE Right 9/11/2019    RIGHT CARPAL TUNNEL RELEASE performed by Mindi Phillips MD at [facility] Left 12/27/2019    LEFT CARPAL TUNNEL RELEASE performed by Mindi Phillips MD at [address] [place] [address]  2012    SKIN BIOPSY      TONSILLECTOMY      WISDOM TOOTH EXTRACTION         Allergies   Allergen Reactions    Wasp Venom Other (See Comments)     Outpatient Medications Marked as Taking for the 11/15/22 encounter (Office Visit) with Ibrahima Ba MD   Medication Sig Dispense Refill    predniSONE (DELTASONE) 2.5 MG tablet TAKE 1 TABLET BY MOUTH IN  THE MORNING 90 tablet 0    clobetasol (TEMOVATE) 0.05 % external solution Apply to itchy areas of scalp for 2 weeks or until improved. 50 mL 2    mycophenolate (CELLCEPT) 500 MG tablet TAKE 3 TABLETS BY MOUTH  TWICE DAILY 540 tablet 0    sulfamethoxazole-trimethoprim (BACTRIM DS) 800-160 MG per tablet Take 1 tab three times a week 36 tablet 2    levothyroxine (SYNTHROID) 100 MCG tablet TAKE 1 TABLET BY MOUTH  DAILY 90 tablet 3    ibuprofen (ADVIL;MOTRIN) 600 MG tablet Take 1 tablet by mouth every 6 hours as needed for Pain 60 tablet 1         Physical Examination   No acute distress. Mood clear/affect appropriate. Alert and oriented. Mucous membranes moist.  Sclera anicteric. Visible skin exam was conducted to include the scalp, face, lips/teeth, lids/conjunctiva, ears, neck, right and left hands and forearms and was normal with the following exceptions:   - Pink plaques with thick white scale on occipital scalp -- less inflamed/thick than prior   - Pink patches with finer scale on bilateral frontotemporal scalp--less inflamed than prior   - Thin pink scaly plaques on L forearm, R hip, upper back > dorsal hands     Assessment and Plan     1. Other atopic dermatitis, scalp/body-- improved, but not controlled  -Patient has eruption on scalp, arms, hands, legs with 3 biopsies in the past showing eosinophils and chronic dermatitis findings and no findings of VID/increased mucin as we would expect with DM.  Although eosinophils can be rarely seen with DM, it is atypical.   -Pt is more improved on scalp after switching to bland shampoo, but she continues to dye/bleach hair which can contribute to ACD/eczema if this is playing a part which I favor that this is more c/w with eczema than dermatomyositis for scalp involvement   -Reviewed next best steps including starting dupilumab to treat as eczema/ACD vs. Discussing MTX with Dr. Priscilla Geller vs. Continue with topicals. Pt interested in dupilumab, but will discuss with Dr. Priscilla Geller. Paperwork for [facility] started today in preparation   -Cont clobetasol solution BID PRN for scalp for now    RTC Jan 2023    Note is transcribed using voice recognition software. Inadvertent computerized transcription errors may be present.     Nelson Manzanares MD

## 2022-11-15 NOTE — PATIENT INSTRUCTIONS
Thank you for visiting [facility] today!  Please follow the instructions below as we discussed in clinic:      We talked about starting methotrexate or Dupixent injections    Continue clobetasol solution

## 2022-11-29 NOTE — PROGRESS NOTES
Discussed patient with Dr. Marta Mendiola and she's on board with trialing dupilumab.  Paperwork faxed to insurance today    Ting Hutchins MD

## 2022-12-01 ENCOUNTER — TELEPHONE (OUTPATIENT)
Dept: RHEUMATOLOGY | Age: 57
End: 2022-12-01

## 2022-12-01 NOTE — TELEPHONE ENCOUNTER
Pt calling to follow up from OV and see if you wanted her to have the infusion this month (rituxan) or wait until February. Pt recently saw Dermatology and is currently waiting on authorization to begin Dupixant medication. Pt was told to follow up after labs were completed and appointment with Derm.  Pt can be reached at [phone]

## 2022-12-02 NOTE — TELEPHONE ENCOUNTER
She is due is Jan 2023. Please call me in the first week of Jan, Insurance will not cover less than 6 month.

## 2022-12-06 NOTE — PROGRESS NOTES
Dupixent denied until pt first fails [facility] or [facility]. Appealed denial with insurance since these aren't feasible options for her main symptomatic involvement in scalp.  Faxed back to insurance today    Estephania Godinez MD

## 2022-12-08 ENCOUNTER — TELEPHONE (OUTPATIENT)
Dept: FAMILY MEDICINE CLINIC | Age: 57
End: 2022-12-08

## 2022-12-08 NOTE — TELEPHONE ENCOUNTER
Talked to patient - Lucy Han filled out the form when she was here in July but didn't sign it. Linda will have the form faxed or emailed tomorrow to have a provider sign.

## 2022-12-08 NOTE — TELEPHONE ENCOUNTER
Pt stated her worked faxed over physical form papers that were not signed at appointment back in July. She wanted them completed and sent to her email [email or web address]. I was unable to locate the forms in [facility] or office.

## 2022-12-13 ENCOUNTER — OFFICE VISIT (OUTPATIENT)
Dept: FAMILY MEDICINE CLINIC | Age: 57
End: 2022-12-13
Payer: COMMERCIAL

## 2022-12-13 VITALS
BODY MASS INDEX: 28.94 KG/M2 | HEIGHT: 60 IN | SYSTOLIC BLOOD PRESSURE: 120 MMHG | TEMPERATURE: 97.1 F | WEIGHT: 147.4 LBS | OXYGEN SATURATION: 97 % | HEART RATE: 78 BPM | DIASTOLIC BLOOD PRESSURE: 80 MMHG

## 2022-12-13 DIAGNOSIS — J01.10 ACUTE NON-RECURRENT FRONTAL SINUSITIS: ICD-10-CM

## 2022-12-13 PROCEDURE — G8427 DOCREV CUR MEDS BY ELIG CLIN: HCPCS | Performed by: NURSE PRACTITIONER

## 2022-12-13 PROCEDURE — G8484 FLU IMMUNIZE NO ADMIN: HCPCS | Performed by: NURSE PRACTITIONER

## 2022-12-13 PROCEDURE — 3017F COLORECTAL CA SCREEN DOC REV: CPT | Performed by: NURSE PRACTITIONER

## 2022-12-13 PROCEDURE — 99213 OFFICE O/P EST LOW 20 MIN: CPT | Performed by: NURSE PRACTITIONER

## 2022-12-13 PROCEDURE — 1036F TOBACCO NON-USER: CPT | Performed by: NURSE PRACTITIONER

## 2022-12-13 PROCEDURE — G8419 CALC BMI OUT NRM PARAM NOF/U: HCPCS | Performed by: NURSE PRACTITIONER

## 2022-12-13 ASSESSMENT — PATIENT HEALTH QUESTIONNAIRE - PHQ9
SUM OF ALL RESPONSES TO PHQ QUESTIONS 1-9: 0
SUM OF ALL RESPONSES TO PHQ9 QUESTIONS 1 & 2: 0
1. LITTLE INTEREST OR PLEASURE IN DOING THINGS: 0
SUM OF ALL RESPONSES TO PHQ QUESTIONS 1-9: 0
2. FEELING DOWN, DEPRESSED OR HOPELESS: 0

## 2022-12-14 PROBLEM — J01.10 ACUTE NON-RECURRENT FRONTAL SINUSITIS: Status: ACTIVE | Noted: 2022-12-14

## 2022-12-14 ASSESSMENT — ENCOUNTER SYMPTOMS
COLOR CHANGE: 0
SHORTNESS OF BREATH: 0
ABDOMINAL PAIN: 0
EYE ITCHING: 0
RHINORRHEA: 0
EYE REDNESS: 0
CHEST TIGHTNESS: 0
CONSTIPATION: 0
SINUS PRESSURE: 0
VOMITING: 0
WHEEZING: 0
SORE THROAT: 0
NAUSEA: 0
DIARRHEA: 0
COUGH: 0
BACK PAIN: 0
BLOOD IN STOOL: 0

## 2022-12-14 NOTE — PROGRESS NOTES
Linda Hays (:  1965) is a 62 y.o. female,Established patient, here for evaluation of the following chief complaint(s):  Asthma (Flare-up /) and Cough (Coughing throughout the day and spitting up phlegm /)      ASSESSMENT/PLAN:  1. Acute non-recurrent frontal sinusitis  Assessment & Plan:   Continue with symptomatic management. Recommend increased water intake as well as saline irrigation, mucolytics, and antihistamines as needed. Advised to call back if no improvement over the next 4-7 days. flonase    No follow-ups on file. SUBJECTIVE/OBJECTIVE:  Patient comes to the office complaining of cough, congestion, drainage that has been present for the last several days. Her cough is productive of yellow sputum. Patient does not have associated fever or chills. Patient denies gastrointestinal symptoms related to Her present condition. symptoms overall are improving. Current Outpatient Medications   Medication Sig Dispense Refill    predniSONE (DELTASONE) 2.5 MG tablet TAKE 1 TABLET BY MOUTH IN  THE MORNING 90 tablet 0    clobetasol (TEMOVATE) 0.05 % external solution Apply to itchy areas of scalp for 2 weeks or until improved. 50 mL 2    mycophenolate (CELLCEPT) 500 MG tablet TAKE 3 TABLETS BY MOUTH  TWICE DAILY 540 tablet 0    sulfamethoxazole-trimethoprim (BACTRIM DS) 800-160 MG per tablet Take 1 tab three times a week 36 tablet 2    levothyroxine (SYNTHROID) 100 MCG tablet TAKE 1 TABLET BY MOUTH  DAILY 90 tablet 3    ibuprofen (ADVIL;MOTRIN) 600 MG tablet Take 1 tablet by mouth every 6 hours as needed for Pain 60 tablet 1     No current facility-administered medications for this visit. Review of Systems   Constitutional:  Negative for chills, fatigue and fever. HENT:  Negative for congestion, ear pain, postnasal drip, rhinorrhea, sinus pressure, sneezing and sore throat. Eyes:  Negative for redness and itching.    Respiratory:  Negative for cough, chest tightness, shortness of breath and wheezing. Cardiovascular:  Negative for chest pain and palpitations. Gastrointestinal:  Negative for abdominal pain, blood in stool, constipation, diarrhea, nausea and vomiting. Endocrine: Negative for cold intolerance and heat intolerance. Genitourinary:  Negative for difficulty urinating, dysuria, flank pain, frequency, hematuria and urgency. Musculoskeletal:  Negative for arthralgias, back pain, joint swelling and myalgias. Skin:  Negative for color change, pallor, rash and wound. Allergic/Immunologic: Positive for environmental allergies. Negative for food allergies. Neurological:  Negative for dizziness, seizures, syncope, weakness, light-headedness, numbness and headaches. Hematological:  Negative for adenopathy. Does not bruise/bleed easily. Psychiatric/Behavioral:  Negative for confusion, sleep disturbance and suicidal ideas. The patient is not nervous/anxious and is not hyperactive. Vitals:    12/13/22 1628   BP: 120/80   Pulse: 78   Temp: 97.1 °F (36.2 °C)   SpO2: 97%   Weight: 147 lb 6.4 oz (66.9 kg)   Height: 5' (1.524 m)       Physical Exam  Constitutional:       Appearance: Normal appearance. She is well-developed. HENT:      Head: Normocephalic and atraumatic. Right Ear: Hearing normal.      Left Ear: Hearing normal.      Nose: No mucosal edema. Right Sinus: No maxillary sinus tenderness or frontal sinus tenderness. Left Sinus: No maxillary sinus tenderness or frontal sinus tenderness. Mouth/Throat: Tonsils: No tonsillar abscesses. Eyes:      Extraocular Movements: Extraocular movements intact. Pupils: Pupils are equal, round, and reactive to light. Cardiovascular:      Rate and Rhythm: Normal rate and regular rhythm. Pulses: Normal pulses. Heart sounds: Normal heart sounds. Pulmonary:      Effort: Pulmonary effort is normal.      Breath sounds: Normal breath sounds.    Lymphadenopathy:      Head:      Right side of head: No submental, submandibular, tonsillar, preauricular, posterior auricular or occipital adenopathy. Left side of head: No submental, submandibular, tonsillar, preauricular, posterior auricular or occipital adenopathy. Skin:     General: Skin is warm and dry. Neurological:      Mental Status: She is alert. Psychiatric:         Mood and Affect: Mood normal.         Behavior: Behavior normal.               An electronic signature was used to authenticate this note.     --REGINA Sol - CNP

## 2022-12-14 NOTE — ASSESSMENT & PLAN NOTE
Continue with symptomatic management. Recommend increased water intake as well as saline irrigation, mucolytics, and antihistamines as needed. Advised to call back if no improvement over the next 4-7 days.    flonase

## 2022-12-19 ENCOUNTER — TELEPHONE (OUTPATIENT)
Dept: DERMATOLOGY | Age: 57
End: 2022-12-19

## 2022-12-19 NOTE — TELEPHONE ENCOUNTER
Called and spoke to patient informed her that it is ok to come into the office for injection training. I explained that patient will need to come to the [facility] office due to Dr. Citlalli Bhardwaj being out of office. Patient expressed understanding and was agreeable.

## 2022-12-19 NOTE — TELEPHONE ENCOUNTER
[phone]. Patient said her Dupixent will be arriving on Thursday 12/22. She said that Eder told her she could come to into the office so a RN/MA can administer the injection for her. I wanted to send a note informing you and I told the patient Eder should be returning her call!     Please advise, thank you!!

## 2022-12-22 ENCOUNTER — TELEPHONE (OUTPATIENT)
Dept: DERMATOLOGY | Age: 57
End: 2022-12-22

## 2022-12-22 ENCOUNTER — NURSE ONLY (OUTPATIENT)
Dept: DERMATOLOGY | Age: 57
End: 2022-12-22

## 2022-12-22 NOTE — PROGRESS NOTES
Patient here today for injection training for [address]. Patient provided their own medication. Staff provided detailed instructions for safe administration, proper disposal, and storage requirements. Medication was successfully administered by staff then and patient's . Patient was educated on signs of symptoms of an allergic reaction. Patient verbalized understanding and had no further questions or concerns.

## 2023-01-03 ENCOUNTER — TELEPHONE (OUTPATIENT)
Dept: DERMATOLOGY | Age: 58
End: 2023-01-03

## 2023-01-05 ENCOUNTER — TELEPHONE (OUTPATIENT)
Dept: RHEUMATOLOGY | Age: 58
End: 2023-01-05

## 2023-01-09 ENCOUNTER — TELEPHONE (OUTPATIENT)
Dept: DERMATOLOGY | Age: 58
End: 2023-01-09

## 2023-01-09 DIAGNOSIS — D89.89 ANTISYNTHETASE SYNDROME (HCC): Primary | ICD-10-CM

## 2023-01-09 DIAGNOSIS — M19.90 INFLAMMATORY ARTHRITIS: ICD-10-CM

## 2023-01-09 NOTE — TELEPHONE ENCOUNTER
Called and spoke to patient informed patient of Dr. Michelle Farnsworth 's recommendations that patient should hold the dupixent until Dr. Michelle Farnsworth can evaluate her in clinic next week. Symptoms she described would be atypical for dupixent, but it is possible. I told her if she has any SOB or other anaphylactic symptoms she should go to ED/seek more immediate care. Patient expressed understanding.  Patient scheduled for 1/16/23 at 2:15 pm.

## 2023-01-09 NOTE — TELEPHONE ENCOUNTER
[phone]. Patient said she gave herself her second Dupixent injection- since then she is experiencing some side effects. Thinks she's experiencing joint & muscle pain. Patient says she has raised bumps under skin, on back of arms & red splotches too. Mostly all on left side of her body, wants to know if this is normal or if she needs to be seen in office about this.     Please advise, thank you!!

## 2023-01-09 NOTE — TELEPHONE ENCOUNTER
Tried calling patient with no answer. LVM that she should hold the dupixent until I can evaluate her in clinic next week Lauren Mcnamara, can we add her on to Monday or Tuesday clinic)? Symptoms she described would be atypical for dupixent, but it is possible. I told her if she has any SOB or other anaphylactic symptoms she should go to ED/seek more immediate care.     Ana Cristina Mark MD

## 2023-01-09 NOTE — TELEPHONE ENCOUNTER
Please complete labs at the earliest convenience.    Could you please obtain a copy of infusion orders from the facility where she gets infusion so that I can sign orders

## 2023-01-16 ENCOUNTER — OFFICE VISIT (OUTPATIENT)
Dept: DERMATOLOGY | Age: 58
End: 2023-01-16
Payer: COMMERCIAL

## 2023-01-16 DIAGNOSIS — R21 RASH AND OTHER NONSPECIFIC SKIN ERUPTION: Primary | ICD-10-CM

## 2023-01-16 DIAGNOSIS — M19.90 INFLAMMATORY ARTHRITIS: ICD-10-CM

## 2023-01-16 LAB
ALBUMIN SERPL-MCNC: 3.7 G/DL (ref 3.4–5)
ALP BLD-CCNC: 87 U/L (ref 40–129)
ALT SERPL-CCNC: 50 U/L (ref 10–40)
AST SERPL-CCNC: 62 U/L (ref 15–37)
BASOPHILS ABSOLUTE: 0 K/UL (ref 0–0.2)
BASOPHILS RELATIVE PERCENT: 0.6 %
BILIRUB SERPL-MCNC: <0.2 MG/DL (ref 0–1)
BILIRUBIN DIRECT: <0.2 MG/DL (ref 0–0.3)
BILIRUBIN, INDIRECT: ABNORMAL MG/DL (ref 0–1)
C-REACTIVE PROTEIN: 32.3 MG/L (ref 0–5.1)
EOSINOPHILS ABSOLUTE: 0.3 K/UL (ref 0–0.6)
EOSINOPHILS RELATIVE PERCENT: 4 %
HCT VFR BLD CALC: 39.6 % (ref 36–48)
HEMOGLOBIN: 12.7 G/DL (ref 12–16)
HEPATITIS B SURFACE ANTIGEN INTERPRETATION: NORMAL
HEPATITIS C ANTIBODY INTERPRETATION: NORMAL
IGG: 857 MG/DL (ref 700–1600)
LYMPHOCYTES ABSOLUTE: 1.1 K/UL (ref 1–5.1)
LYMPHOCYTES RELATIVE PERCENT: 15.1 %
MCH RBC QN AUTO: 29.2 PG (ref 26–34)
MCHC RBC AUTO-ENTMCNC: 32.1 G/DL (ref 31–36)
MCV RBC AUTO: 90.7 FL (ref 80–100)
MONOCYTES ABSOLUTE: 0.7 K/UL (ref 0–1.3)
MONOCYTES RELATIVE PERCENT: 10.3 %
NEUTROPHILS ABSOLUTE: 4.9 K/UL (ref 1.7–7.7)
NEUTROPHILS RELATIVE PERCENT: 70 %
PDW BLD-RTO: 13.6 % (ref 12.4–15.4)
PLATELET # BLD: 367 K/UL (ref 135–450)
PMV BLD AUTO: 8.2 FL (ref 5–10.5)
RBC # BLD: 4.37 M/UL (ref 4–5.2)
SEDIMENTATION RATE, ERYTHROCYTE: 40 MM/HR (ref 0–30)
TOTAL CK: 2492 U/L (ref 26–192)
TOTAL PROTEIN: 6.1 G/DL (ref 6.4–8.2)
WBC # BLD: 7 K/UL (ref 4–11)

## 2023-01-16 PROCEDURE — G8428 CUR MEDS NOT DOCUMENT: HCPCS | Performed by: INTERNAL MEDICINE

## 2023-01-16 PROCEDURE — 3017F COLORECTAL CA SCREEN DOC REV: CPT | Performed by: INTERNAL MEDICINE

## 2023-01-16 PROCEDURE — G8484 FLU IMMUNIZE NO ADMIN: HCPCS | Performed by: INTERNAL MEDICINE

## 2023-01-16 PROCEDURE — 1036F TOBACCO NON-USER: CPT | Performed by: INTERNAL MEDICINE

## 2023-01-16 PROCEDURE — 99214 OFFICE O/P EST MOD 30 MIN: CPT | Performed by: INTERNAL MEDICINE

## 2023-01-16 PROCEDURE — 11104 PUNCH BX SKIN SINGLE LESION: CPT | Performed by: INTERNAL MEDICINE

## 2023-01-16 PROCEDURE — G8419 CALC BMI OUT NRM PARAM NOF/U: HCPCS | Performed by: INTERNAL MEDICINE

## 2023-01-16 NOTE — PROGRESS NOTES
[facility]  Leslie Hadley MD  [phone]    Date of Visit: 1/16/2023  LV: 9/2022    PMH: dermatomyositis/antisynthetase syndrome     Baez Rash is a 62 y.o. female who presents for follow up of dupixent  Chief Complaint:   Chief Complaint   Patient presents with    Follow-up     Dupixent questions and rash check       History of Present Illness:    Concern:  Rash on scalp with biopsy c/w atopic derm vs. ICD vs. Nummular dermatitis  Location: Worse in scalp > arms, back  Duration:  Many years; pt thinks she's had some kind of scaly rash on scalp since childhood, but not sure if it's the same exact rash now  Symptoms: Very itchy with flares  Previous treatments:    -Prednisone  -Otezla x 1 months--stopped 2/2 diarrhea, headaches and insurance not approving   -Rituximab + IVIG for dermatomyositis/ polymyositis--helped with scalp symptoms/rash, but now it's returning. Felt IVIG portion helped most  Current treatments:   -Clobetasol ointment BID PRN--held it for 2 weeks prior to biopsy  -Cellcept 1500mg BID for dermatomyositis  -Rituximab q6 months (due in Dec or Jan upcoming)  -Prednisone 2.5mg daily   -Switched shampoo to one from my list; recently bleached/colored hair   -Dupixent injection x loading dose and 2nd dose   Effect of current treatment: Scalp itch/erythema improved from prior, back rash is clear  SE: Here today due to increasing joint pain in wrists, knees, ankles, hands \"all over\" + new rash that she thinks may have all started 1 week after first Dupixent injection, but is more noticeable after second one. Has held dupixent for now. She denies any other illness or symptoms at this time including SOB, fevers  Other history:  Seen by Dr. Alexander Sharma initially in 2020 for photoexposed eruption on chest, arms, back, thighs + itchy scalp rash.  Scalp rash at the time was refractory to topical CS  Since 2020 Also seeing Dr. Elenita Rosario for her antisynthetase syndrome/polymyositis treated previously with prednisone and imuran-->switched to cellcept, Aza, pred  3/2020: Both biopsies from hand and hip showed no evidence of VID. Showed eosinophils on hands and evidence of \"dermatitis\". 6/2020: Saw Dr. Aliyah Sánchez again for severe burning, itching flaking of scalp despite using clobetasol ointment. She favored all skin involvement including scalp was DM despite biopsy findings not showing VID. She tried to start Allegra Lipoma as it has been shown to help with DM on scalp  7/2020: Started on Allegra Lipoma with nausea, diarrhea, headaches (per pt, she only completed the starter pack and it was never approved by insurance to continue further). Pt deferred starting Plaquenil due to side effects  12/2020: Stopped prednisone and only taking Cellcept at the time   3/2021: Josiah Carrera started plaquenil 200mg BID   5/2021: Pt's muscle disease and ILD was progressing so she was started on Rituximab (5/7/2021, and 6/11/2021. 12/7/2021, Dec 12/2021, 7/2022) and IVIG (May 26- 28,  July, Aug, sept, Oct,  Nov 2021) which coincided with great improvement in scalp sx per patient. Pt sent message to Dr. Aliyah Sánchez in 2021 saying she wasn't interested in Allegra Lipoma since scalp was better on these treatments)  8/2022: Recently had Rituxan in 7/2022, continued on Cellcept 1500mg daily, prednisone 5mg day     *Personal history of skin cancer: None  *Family history of skin cancer: None     Review of Systems:  Gen: Feels well, good sense of health. Skin: No new or changing moles, no history of keloids or hypertrophic scars. Past Medical History, Family History, Surgical History, Medications and Allergies reviewed.     Past Medical History:   Diagnosis Date    Allergic rhinitis     Arthritis     Asthma     Chronic sinusitis     Dermatitis     Hypothyroidism     Polymyositis ([facility] [address]     Senile osteoporosis 6/17/2021     Past Surgical History:   Procedure Laterality Date    CARPAL TUNNEL RELEASE Right 9/11/2019    RIGHT CARPAL TUNNEL RELEASE performed by Opal Osuna MD at [address] Left 12/27/2019    LEFT CARPAL TUNNEL RELEASE performed by Opal Osuna MD at [address]  2012    SKIN BIOPSY      TONSILLECTOMY      WISDOM TOOTH EXTRACTION         Allergies   Allergen Reactions    Wasp Venom Other (See Comments)     Outpatient Medications Marked as Taking for the 1/16/23 encounter (Office Visit) with Jose C Hays MD   Medication Sig Dispense Refill    predniSONE (DELTASONE) 2.5 MG tablet TAKE 1 TABLET BY MOUTH IN  THE MORNING 90 tablet 0    clobetasol (TEMOVATE) 0.05 % external solution Apply to itchy areas of scalp for 2 weeks or until improved. 50 mL 2    mycophenolate (CELLCEPT) 500 MG tablet TAKE 3 TABLETS BY MOUTH  TWICE DAILY 540 tablet 0    sulfamethoxazole-trimethoprim (BACTRIM DS) 800-160 MG per tablet Take 1 tab three times a week 36 tablet 2    levothyroxine (SYNTHROID) 100 MCG tablet TAKE 1 TABLET BY MOUTH  DAILY 90 tablet 3    ibuprofen (ADVIL;MOTRIN) 600 MG tablet Take 1 tablet by mouth every 6 hours as needed for Pain 60 tablet 1         Physical Examination   No acute distress. Mood clear/affect appropriate. Alert and oriented. Mucous membranes moist.  Sclera anicteric. Visible skin exam was conducted to include the scalp, face, lips, lids/conjunctiva, ears, neck, right and left hands and forearms and was normal with the following exceptions:   - Thinner pink scaly patches on occipital scalp, frontal scalp is much improved, less red. Back patch and dorsal hand rash is mostly gone  -Pink indurated painful nodules on bilateral extensor forearms > L shin > R shin/ lower ankle. Assessment and Plan     1.  Other atopic dermatitis, scalp/body-- improved, but not controlled and having possible SE from [employer]   -Patient has eruption on scalp, arms, hands, legs with 3 biopsies in the past showing eosinophils and chronic dermatitis findings and no findings of VID/increased mucin as we would expect with DM. Although eosinophils can be rarely seen with DM, it is atypical.   -Pt having improvement with topicals and now starting dupixent, but she may be having SE of dupixent (joint pain has been more rarely reported with dupixent + see below)  -Recommend:  -Hold dupixent for now  -Cont clobetasol solution BID PRN for scalp for now    2. Rash and other nonspecific skin eruption, arms>legs--not controlled  -Ddx includes erythema nodosum (2 case reports of EN induced by dupixent) vs. Deep urticaria vs. Other panniculitis (lupus panniculitis) vs. Rheumatoid nodules  -Recommend punch bx today to assist with dx  Punch biopsy procedure note  Discussed possible diagnosis; patient agreeable to proceed with the biopsy (writtent consent obtained). We also reviewed the risks of bleeding, scar, and infection. The area(s) to be biopsied on R forearm were marked with a surgical pen. Alcohol was used to cleanse the site. Local anesthesia was acheived with 1% lidocaine with epinephrine. 4mm punch biopsy was performed followed by placement of simple interrupted nylon sutures. The wound(s) were dressed with petrolatum and covered with a bandage. Wound care instructions were reviewed. Specimen (s) sent to pathology. The specimen bottles were appropriately labeled. The patient tolerated the procedure well and there were no immediate complications. Suture removal discussed with patient    RTC pending bx results    Note is transcribed using voice recognition software. Inadvertent computerized transcription errors may be present.     Rosa Morocho MD

## 2023-01-16 NOTE — PATIENT INSTRUCTIONS
Thank you for visiting [facility] today! Please follow the instructions below as we discussed in clinic:      Stop the [address] for now    Biopsy Wound Care Instructions  Cleanse the wound with mild soapy water daily. Gently dry the area. Apply Vaseline or petroleum jelly to the wound using a cotton tipped applicator. Cover with a clean bandage. Repeat this process until the biopsy site is healed. If you had stitches placed, continue treating the site until the stitches are removed. Remember to make an appointment to return to have your stitches removed by our staff. You may shower and bathe as usual.   ** Biopsy results generally take around 7 business days to come back. If you have not heard from us by then, please call the office at [phone] between 8AM and 4PM Monday through Friday.

## 2023-01-17 NOTE — TELEPHONE ENCOUNTER
Rituxan orders signed, but cannot be faxed until patient completes lab. LM advising pt of this information.

## 2023-01-18 LAB — ALDOLASE: 45.8 U/L (ref 1.2–7.6)

## 2023-01-19 LAB
QUANTI TB GOLD PLUS: NEGATIVE
QUANTI TB1 MINUS NIL: 0 IU/ML (ref 0–0.34)
QUANTI TB2 MINUS NIL: 0 IU/ML (ref 0–0.34)
QUANTIFERON MITOGEN: 1.28 IU/ML
QUANTIFERON NIL: 0.07 IU/ML

## 2023-01-24 ENCOUNTER — TELEPHONE (OUTPATIENT)
Dept: DERMATOLOGY | Age: 58
End: 2023-01-24

## 2023-01-24 NOTE — TELEPHONE ENCOUNTER
----- Message from Usha Bishop MD sent at 1/24/2023 10:05 AM EST -----  Hi Vannesa,    This pt is able to  come in on Thursday at 3:45pm. You don't have to call her, we can just add her to schedule    Usha Bishop MD    ___  Spoke with patient today 1/24. Biopsy scanned into chart but showing sarcoidal inflammation and cannot r/o infection. Given pt is on  Rituximab, Cellcept, pred, would want to r/o infection with tissue cx. Pt will return to clinic this week to perform tissue cx for AFB, fungal, bacteria. Other ddx includes sarcoidal drug eruption 2/2 dupixent (not been reported in literature) or incidental new presentation of sarcoid (1 case report of neuro sarcoid in setting of dupixent). Pt due for next rituxan in next few weeks, but will update Dr. Ventura who may assist with ordering sarcoid related labs. Pt denies any other new sx other than newer muscle pain/weakness and elevations in CK /aldolase (unclear if related or if worsening due to being due for Rituxan).

## 2023-01-26 ENCOUNTER — OFFICE VISIT (OUTPATIENT)
Dept: DERMATOLOGY | Age: 58
End: 2023-01-26

## 2023-01-26 DIAGNOSIS — L20.89 OTHER ATOPIC DERMATITIS: ICD-10-CM

## 2023-01-26 DIAGNOSIS — R21 RASH AND OTHER NONSPECIFIC SKIN ERUPTION: Primary | ICD-10-CM

## 2023-01-26 NOTE — PROGRESS NOTES
[facility]  Steve Bui MD  [phone]    Date of Visit: 1/26/2023  LV: 1/16/2023    PMH: dermatomyositis/antisynthetase syndrome     Gerda Boyd is a 62 y.o. female who presents for follow up rash    Chief Complaint:   Chief Complaint   Patient presents with    Follow-up     rash         History of Present Illness:    Concern:  Rash on scalp with biopsy c/w atopic derm vs. ICD vs. Nummular dermatitis  Location: Worse in scalp > arms, back  Duration:  Many years; pt thinks she's had some kind of scaly rash on scalp since childhood, but not sure if it's the same exact rash now  Symptoms: Very itchy with flares  Previous treatments:    -Prednisone  -Otezla x 1 months--stopped 2/2 diarrhea, headaches and insurance not approving   -Rituximab + IVIG for dermatomyositis/ polymyositis--helped with scalp symptoms/rash, but now it's returning. Felt IVIG portion helped most  Current treatments:   -Clobetasol ointment BID PRN--held it for 2 weeks prior to biopsy  -Cellcept 1500mg BID for dermatomyositis  -Rituximab q6 months (due in Dec or Jan upcoming)  -Prednisone 2.5mg daily   -Switched shampoo to one from my list; recently bleached/colored hair   -Dupixent injection x loading dose and 2nd dose   Effect of current treatment: Scalp itch/erythema improved from prior, back rash is clear  SE: Here today due to increasing joint pain in wrists, knees, ankles, hands \"all over\" + new rash that she thinks may have all started 1 week after first Dupixent injection, but is more noticeable after second one. Has held dupixent for now. She denies any other illness or symptoms at this time including SOB, fevers  Other history:  Seen by Dr. Nubia Díaz initially in 2020 for photoexposed eruption on chest, arms, back, thighs + itchy scalp rash.  Scalp rash at the time was refractory to topical CS  Since 2020 Also seeing Dr. Cedric Maher for her antisynthetase syndrome/polymyositis treated previously with prednisone and imuran-->switched to cellcept, Aza, pred  3/2020: Both biopsies from hand and hip showed no evidence of VID. Showed eosinophils on hands and evidence of \"dermatitis\". 6/2020: Saw Dr. Elissa Skiff again for severe burning, itching flaking of scalp despite using clobetasol ointment. She favored all skin involvement including scalp was DM despite biopsy findings not showing VID. She tried to start Olvin [place] as it has been shown to help with DM on scalp  7/2020: Started on Olvin [place] with nausea, diarrhea, headaches (per pt, she only completed the starter pack and it was never approved by insurance to continue further). Pt deferred starting Plaquenil due to side effects  12/2020: Stopped prednisone and only taking Cellcept at the time   3/2021: Karina Aquino started plaquenil 200mg BID   5/2021: Pt's muscle disease and ILD was progressing so she was started on Rituximab (5/7/2021, and 6/11/2021. 12/7/2021, Dec 12/2021, 7/2022) and IVIG (May 26- 28,  July, Aug, sept, Oct,  Nov 2021) which coincided with great improvement in scalp sx per patient. Pt sent message to Dr. 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  Tissue   cultures are recommended as clinically indicated. *Personal history of skin cancer: None  *Family history of skin cancer: None     Review of Systems:  Gen: Feels well, good sense of health. Skin: No new or changing moles, no history of keloids or hypertrophic scars. Past Medical History, Family History, Surgical History, Medications and Allergies reviewed. Past Medical History:   Diagnosis Date    Allergic rhinitis     Arthritis     Asthma     Chronic sinusitis     Dermatitis     Hypothyroidism     Polymyositis ([address]     Senile osteoporosis 6/17/2021     Past Surgical History:   Procedure Laterality Date    CARPAL TUNNEL RELEASE Right 9/11/2019    RIGHT CARPAL TUNNEL RELEASE performed by Jadyn Jaramillo MD at [address] [facility] Left 12/27/2019    LEFT CARPAL TUNNEL RELEASE performed by Jadyn Jaramillo MD at [address]  2012    SKIN BIOPSY      TONSILLECTOMY      WISDOM TOOTH EXTRACTION         Allergies   Allergen Reactions    Wasp Venom Other (See Comments)     Outpatient Medications Marked as Taking for the 1/26/23 encounter (Office Visit) with Marco Mejía MD   Medication Sig Dispense Refill    predniSONE (DELTASONE) 2.5 MG tablet TAKE 1 TABLET BY MOUTH IN  THE MORNING 90 tablet 0    clobetasol (TEMOVATE) 0.05 % external solution Apply to itchy areas of scalp for 2 weeks or until improved. 50 mL 2    mycophenolate (CELLCEPT) 500 MG tablet TAKE 3 TABLETS BY MOUTH  TWICE DAILY 540 tablet 0    sulfamethoxazole-trimethoprim (BACTRIM DS) 800-160 MG per tablet Take 1 tab three times a week 36 tablet 2    levothyroxine (SYNTHROID) 100 MCG tablet TAKE 1 TABLET BY MOUTH  DAILY 90 tablet 3    ibuprofen (ADVIL;MOTRIN) 600 MG tablet Take 1 tablet by mouth every 6 hours as needed for Pain 60 tablet 1         Physical Examination   No acute distress. Mood clear/affect appropriate. Alert and oriented. Mucous membranes moist.  Sclera anicteric.     Visible skin exam was conducted to include the scalp, face, lips, lids/conjunctiva, ears, neck, right and left hands and forearms and was normal with the following exceptions:   - Thinner pink scaly patches on occipital scalp, frontal scalp is much improved, less red. Back patch and dorsal hand rash is mostly gone  -Pink indurated painful nodules on bilateral extensor forearms > L shin > R shin/ lower ankle. Forearm ones are less erythematous and firm, but bilateral LE are still active. No other new areas     Assessment and Plan     1. Other atopic dermatitis, scalp/body-- improved, but not controlled and having possible SE from [address]   -Patient has eruption on scalp, arms, hands, legs with 3 biopsies in the past showing eosinophils and chronic dermatitis findings and no findings of VID/increased mucin as we would expect with DM. Although eosinophils can be rarely seen with DM, it is atypical.   -Pt having improvement with topicals and dupixent, but she may be having SE of dupixent (joint pain has been more rarely reported with dupixent + see below)  -Recommend:  -Cont hold dupixent for now  -Cont clobetasol solution BID PRN for scalp for now    2. Rash and other nonspecific skin eruption, arms>legs--not controlled  -Prior H&E biopsy showing sarcoidal-type inflammation c/w \"sarcoidosis, granulomatous drug reaction or infection\"  -Improving off of the dupixent   -Discussed with pt that given her regimen of immunosuppressive medications (Rituximab, pred, Cellcept), we will need to r/o infection with tissue culture today  -Would rec holding Rituxan until we rule out infection   -Denies fevers, chills, other new sx  -DM labs show elevation in liver enzymes (unclear if related or 2/2 being due for Rituxan)  -Messaged Dr. Rogelio Douglass via Epic to update    Punch biopsy procedure note  Discussed possible diagnosis; patient agreeable to proceed with the biopsy (writtent consent obtained). We also reviewed the risks of bleeding, scar, and infection.     The area(s) to be biopsied on L forearm were marked with a surgical pen. Alcohol was used to cleanse the site. Local anesthesia was acheived with 1% lidocaine with epinephrine. 4mm punch biopsy was performed followed by placement of simple interrupted nylon sutures. The wound(s) were dressed with petrolatum and covered with a bandage. Wound care instructions were reviewed. Specimen (s) sent to pathology. The specimen bottles were appropriately labeled. The patient tolerated the procedure well and there were no immediate complications. Suture removal discussed with patient    RTC pending bx results    Note is transcribed using voice recognition software. Inadvertent computerized transcription errors may be present.     Dominic Gagnon MD

## 2023-01-26 NOTE — PATIENT INSTRUCTIONS
Thank you for visiting [facility] today! Please follow the instructions below as we discussed in clinic:      Biopsy Wound Care Instructions  Cleanse the wound with mild soapy water daily. Gently dry the area. Apply Vaseline or petroleum jelly to the wound using a cotton tipped applicator. Cover with a clean bandage. Repeat this process until the biopsy site is healed. If you had stitches placed, continue treating the site until the stitches are removed. Remember to make an appointment to return to have your stitches removed by our staff. You may shower and bathe as usual.   ** Biopsy results generally take around 7 business days to come back. If you have not heard from us by then, please call the office at [phone] between 8AM and 4PM Monday through Friday.

## 2023-01-27 ENCOUNTER — TELEPHONE (OUTPATIENT)
Dept: DERMATOLOGY | Age: 58
End: 2023-01-27

## 2023-01-27 LAB
AFB SMEAR: NORMAL
FUNGUS STAIN: NORMAL

## 2023-01-27 NOTE — TELEPHONE ENCOUNTER
Pt called in stated she has a severe ear ache not sure if what she was seen for has something to do with her ear.    Please advise   Thanks   C/b [phone]

## 2023-01-27 NOTE — TELEPHONE ENCOUNTER
Called pt. She's having ear pain this morning without changes to hearing. Also always having phlegm she spits up each morning. At this point, don't think ear pain is related to work up of rash that we are doing. Would take NSAIDs or tylenol to see if improves sx and notify PCP to see if she can be evaluate for possible ear infection next week.  If any acute changes or hearing loss, should go to JENNI Denise MD

## 2023-01-29 LAB
ANAEROBIC CULTURE: NORMAL
GRAM STAIN RESULT: NORMAL
WOUND/ABSCESS: NORMAL

## 2023-01-30 ENCOUNTER — OFFICE VISIT (OUTPATIENT)
Dept: RHEUMATOLOGY | Age: 58
End: 2023-01-30
Payer: COMMERCIAL

## 2023-01-30 ENCOUNTER — OFFICE VISIT (OUTPATIENT)
Dept: FAMILY MEDICINE CLINIC | Age: 58
End: 2023-01-30
Payer: COMMERCIAL

## 2023-01-30 VITALS
TEMPERATURE: 98.3 F | SYSTOLIC BLOOD PRESSURE: 126 MMHG | HEART RATE: 78 BPM | WEIGHT: 147 LBS | HEIGHT: 60 IN | BODY MASS INDEX: 28.86 KG/M2 | OXYGEN SATURATION: 97 % | DIASTOLIC BLOOD PRESSURE: 78 MMHG

## 2023-01-30 VITALS
HEIGHT: 60 IN | DIASTOLIC BLOOD PRESSURE: 78 MMHG | BODY MASS INDEX: 28.86 KG/M2 | WEIGHT: 147 LBS | SYSTOLIC BLOOD PRESSURE: 126 MMHG

## 2023-01-30 DIAGNOSIS — R22.9 SUBCUTANEOUS NODULES: ICD-10-CM

## 2023-01-30 DIAGNOSIS — M19.90 INFLAMMATORY ARTHRITIS: Primary | ICD-10-CM

## 2023-01-30 DIAGNOSIS — J84.9 ILD (INTERSTITIAL LUNG DISEASE) (HCC): ICD-10-CM

## 2023-01-30 DIAGNOSIS — D89.89 ANTISYNTHETASE SYNDROME (HCC): ICD-10-CM

## 2023-01-30 DIAGNOSIS — H69.81 ACUTE DYSFUNCTION OF RIGHT EUSTACHIAN TUBE: ICD-10-CM

## 2023-01-30 DIAGNOSIS — Z79.899 HIGH RISK MEDICATION USE: ICD-10-CM

## 2023-01-30 DIAGNOSIS — M85.80 STEROID-INDUCED OSTEOPENIA: ICD-10-CM

## 2023-01-30 DIAGNOSIS — T38.0X5A STEROID-INDUCED OSTEOPENIA: ICD-10-CM

## 2023-01-30 PROBLEM — H69.91 ACUTE DYSFUNCTION OF RIGHT EUSTACHIAN TUBE: Status: ACTIVE | Noted: 2023-01-30

## 2023-01-30 PROBLEM — J96.91 RESPIRATORY FAILURE WITH HYPOXIA (HCC): Status: RESOLVED | Noted: 2021-05-06 | Resolved: 2023-01-30

## 2023-01-30 PROCEDURE — G8427 DOCREV CUR MEDS BY ELIG CLIN: HCPCS | Performed by: NURSE PRACTITIONER

## 2023-01-30 PROCEDURE — 3017F COLORECTAL CA SCREEN DOC REV: CPT | Performed by: INTERNAL MEDICINE

## 2023-01-30 PROCEDURE — G8419 CALC BMI OUT NRM PARAM NOF/U: HCPCS | Performed by: INTERNAL MEDICINE

## 2023-01-30 PROCEDURE — 1036F TOBACCO NON-USER: CPT | Performed by: NURSE PRACTITIONER

## 2023-01-30 PROCEDURE — G8419 CALC BMI OUT NRM PARAM NOF/U: HCPCS | Performed by: NURSE PRACTITIONER

## 2023-01-30 PROCEDURE — G8427 DOCREV CUR MEDS BY ELIG CLIN: HCPCS | Performed by: INTERNAL MEDICINE

## 2023-01-30 PROCEDURE — 99214 OFFICE O/P EST MOD 30 MIN: CPT | Performed by: NURSE PRACTITIONER

## 2023-01-30 PROCEDURE — 99215 OFFICE O/P EST HI 40 MIN: CPT | Performed by: INTERNAL MEDICINE

## 2023-01-30 PROCEDURE — 3017F COLORECTAL CA SCREEN DOC REV: CPT | Performed by: NURSE PRACTITIONER

## 2023-01-30 PROCEDURE — 1036F TOBACCO NON-USER: CPT | Performed by: INTERNAL MEDICINE

## 2023-01-30 PROCEDURE — G8484 FLU IMMUNIZE NO ADMIN: HCPCS | Performed by: INTERNAL MEDICINE

## 2023-01-30 PROCEDURE — G8484 FLU IMMUNIZE NO ADMIN: HCPCS | Performed by: NURSE PRACTITIONER

## 2023-01-30 RX ORDER — PREDNISONE 10 MG/1
TABLET ORAL
Qty: 35 TABLET | Refills: 0 | Status: SHIPPED | OUTPATIENT
Start: 2023-01-30

## 2023-01-30 SDOH — ECONOMIC STABILITY: FOOD INSECURITY: WITHIN THE PAST 12 MONTHS, THE FOOD YOU BOUGHT JUST DIDN'T LAST AND YOU DIDN'T HAVE MONEY TO GET MORE.: NEVER TRUE

## 2023-01-30 SDOH — ECONOMIC STABILITY: FOOD INSECURITY: WITHIN THE PAST 12 MONTHS, YOU WORRIED THAT YOUR FOOD WOULD RUN OUT BEFORE YOU GOT MONEY TO BUY MORE.: NEVER TRUE

## 2023-01-30 ASSESSMENT — ENCOUNTER SYMPTOMS
NAUSEA: 0
BLOOD IN STOOL: 0
SINUS PRESSURE: 0
COUGH: 0
WHEEZING: 0
DIARRHEA: 0
BACK PAIN: 0
COLOR CHANGE: 0
SORE THROAT: 0
EYE REDNESS: 0
VOMITING: 0
CONSTIPATION: 0
CHEST TIGHTNESS: 0
EYE ITCHING: 0
ABDOMINAL PAIN: 0
RHINORRHEA: 0
SHORTNESS OF BREATH: 0

## 2023-01-30 ASSESSMENT — PATIENT HEALTH QUESTIONNAIRE - PHQ9
SUM OF ALL RESPONSES TO PHQ QUESTIONS 1-9: 0
DEPRESSION UNABLE TO ASSESS: FUNCTIONAL CAPACITY MOTIVATION LIMITS ACCURACY
1. LITTLE INTEREST OR PLEASURE IN DOING THINGS: 0
SUM OF ALL RESPONSES TO PHQ QUESTIONS 1-9: 0
2. FEELING DOWN, DEPRESSED OR HOPELESS: 0
SUM OF ALL RESPONSES TO PHQ QUESTIONS 1-9: 0
SUM OF ALL RESPONSES TO PHQ QUESTIONS 1-9: 0
SUM OF ALL RESPONSES TO PHQ9 QUESTIONS 1 & 2: 0

## 2023-01-30 ASSESSMENT — SOCIAL DETERMINANTS OF HEALTH (SDOH): HOW HARD IS IT FOR YOU TO PAY FOR THE VERY BASICS LIKE FOOD, HOUSING, MEDICAL CARE, AND HEATING?: NOT HARD AT ALL

## 2023-01-30 NOTE — PROGRESS NOTES
[address] [id number] [phone] (R) [phone] (F)    Note is transcribed using voice recognition software. Inadvertent computerized transcription errors may be present. Patient identification: Kelsie Hays,: ,84 y.o. Sex: female     A/P  Linda was seen today for follow-up. Diagnoses and all orders for this visit:    Inflammatory arthritis    Antisynthetase syndrome ([address]    High risk medication use    Steroid-induced osteopenia    Subcutaneous nodules  -     Angiotensin Converting Enzyme; Future    Other orders  -     predniSONE (DELTASONE) 10 MG tablet; 2 tab po daily x 7 days. 1.5 tab po daily x 7 days. 1 tab po daily x 7 days. 1/2 tab po daily 7 days and stop. 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 A/P from Today's visit-    New onset of erythema nodosum like subcu nodules on extensor surfaces of both forearms as well as legs. Began after Dupixent injection in 2022. Nodules are getting smaller, she is no longer taking Dupixent. Biopsy showed granulomatous inflammation similar to the one seen in sarcoidosis. Patient does not have any other clinical manifestations of sarcoidosis. Chest x-ray no hilar adenopathy. And the subcu nodules began after Dupixent injection, unclear if this is drug reaction. Unlikely to be infectious etiology. TB QuantiFERON negative, cultures are pending.     I  had phone conversation with Dr. Nga Mclean about the possible etiology of her symptoms and further treatment plan. 1.  Polymyositis-anti-Deidra syndrome with  ILD and inflammatory polyarthritis. Worsening muscle weakness in upper, lower extremities as well as inflammatory arthritis. Objectively CK is high, inflammatory markers elevated, and has active inflammatory arthritis. The patient complains of weakness in her lower extremities while getting up from squatting position. Active myositis and inflammatory arthritis. Prednisone taper. Advised patient to call infusion center to schedule rituximab for the week of February 10 to 14. Biopsy culture for fungal infection and atypical mycobacterial infections should be back by then. Last rituximab July 2022. Continue current dose of CellCept 1500 mg daily. PFT from February 2022-normal.    Current/previous therapy-  Started rituximab 5/2021. Has been getting every 6 monthly as RA protocol. IV IG- May 26- 28,  July, Aug, sept, Oct,  Nov 2021 at [facility]. PFT:  4/9/21- PFT-FVC 60% predicted, TLC 79% predicted, DLCO 54% predicted. 8/6/21     FVC 75% pred, TLC 80% predicted, DLCO 72% pred  2/3/2022-normal PFT. 6-minute walk-desaturated to 90% but remained asymptomatic.    2.-Inflammatory arthritis -high disease activity. 3.  Scalp rash-dermatology note reviewed-likely DM rash. She is trying topical clobetasol, IVIG versus Otezla as planned if topical therapy is ineffective. 4.. Bone health -no fragility fractures. Stable. 4/24/2021 lumbar T score -2, left hip -1.6, femoral neck -1.6. Right hip -1.7, femoral neck -1.3    9/20/2019 lumbar T score -1.8, left femoral neck -1.7. Reclast 6/22/2021. continue calcium and vitamin D supplementation. Plan to redose Reclast in 2023 as prophylactic therapy. She is aware of myositis emergencies. RTC  6 weeks. Patient indicates understanding and agrees with the management plan.   I reviewed patient's history, referral documents and electronic medical records. #######################################################################    Subjective-  Follow-up for polymyositis, inflammatory polyarthritis and osteopenia. Interval changes-states that since last seen she feels miserable. Multiple joints are painful, swollen, stiff. Legs feel weak. Overall very tired. Laboratory markers-elevated inflammatory markers and CK. Patient developed painful, firm subcutaneous nodules in the extensor surfaces of forearm as well as a few in her legs front and back. Began a few days after her Dupixent injection. It was discontinued. Biopsy revealed granulomatous inflammation. She does believe that the size of the nodules is getting better over time. However joint pain, muscle pain, muscle weakness and fatigue is getting worse. She denies any fever, chills, weight loss. She is also suffering from sinus congestion around the same time in December. Possibly viral illness. She continues to have rash in the scalp, noticed dry spot on her index fingers. No other dermatomyositis rash. She is currently taking CellCept 1500 mg a day, prednisone 2.5 mg a day and had rituximab in July 2022. All other review of systems are negative. No family history of autoimmune diseases    Current Outpatient Medications   Medication Sig Dispense Refill    predniSONE (DELTASONE) 10 MG tablet 2 tab po daily x 7 days. 1.5 tab po daily x 7 days. 1 tab po daily x 7 days. 1/2 tab po daily 7 days and stop. (Patient not taking: Reported on 1/30/2023) 35 tablet 0    predniSONE (DELTASONE) 2.5 MG tablet TAKE 1 TABLET BY MOUTH IN  THE MORNING 90 tablet 0    clobetasol (TEMOVATE) 0.05 % external solution Apply to itchy areas of scalp for 2 weeks or until improved.  50 mL 2    mycophenolate (CELLCEPT) 500 MG tablet TAKE 3 TABLETS BY MOUTH  TWICE DAILY 540 tablet 0    sulfamethoxazole-trimethoprim (BACTRIM DS) 800-160 MG per tablet Take 1 tab three times a week 36 tablet 2    levothyroxine (SYNTHROID) 100 MCG tablet TAKE 1 TABLET BY MOUTH  DAILY 90 tablet 3    ibuprofen (ADVIL;MOTRIN) 600 MG tablet Take 1 tablet by mouth every 6 hours as needed for Pain 60 tablet 1     No current facility-administered medications for this visit. Allergies   Allergen Reactions    Wasp Venom Other (See Comments)       PHYSICAL EXAM:    Vitals:    /78   Ht 5' (1.524 m)   Wt 147 lb (66.7 kg)   LMP 09/12/2013   BMI 28.71 kg/m²   General appearance/ Psychiatric: well nourished, and well groomed, normal judgement, alert, appears stated age and cooperative. MKS:  28 joint JOINT COUNT:                               Right                                                  Left   Swell Tender Swell Tender   PIP1           PIP2    x    x   PIP3    x    x   PIP4   x    x   PIP5          MCP1           MCP2  x  x  x  x   MCP3  x  x    x   MCP4    x    x   MCP5           Wrist  xx  x  x  x   Elbow    x    x   Shoulder          Knee             Ankles and MTPs are also tender to pressure. She is able to get up from squatting position but has difficulty because of muscle weakness in her lower extremities. Otherwise no overtly  Upper extremity muscle strength normal.  Neck flexors normal.  Skin-subcutaneous, tender, firm multiple nodules on the extensor surfaces of both forearms, left worse than right, and also in the shin area as well as calf area. No rashes. Chest-clear to auscultation no added sounds. S1-S2 regular no added sounds. Scalp-erythema and scaly rash diffuse in his scalp mainly in her hairline anterior and posterior.       DATA:   Lab Results   Component Value Date    WBC 7.0 01/16/2023    HGB 12.7 01/16/2023    HCT 39.6 01/16/2023    MCV 90.7 01/16/2023     01/16/2023         Chemistry        Component Value Date/Time     10/31/2022 0818    K 3.5 10/31/2022 0818     10/31/2022 0818    CO2 25 10/31/2022 0818    BUN 11 10/31/2022 0818    CREATININE 0.6 10/31/2022 0818        Component Value Date/Time    CALCIUM 8.8 10/31/2022 0818    ALKPHOS 87 01/16/2023 1550    AST 62 (H) 01/16/2023 1550    ALT 50 (H) 01/16/2023 1550    BILITOT <0.2 01/16/2023 1550          No results found for: [facility]  Lab Results   Component Value Date    SEDRATE 40 (H) 01/16/2023     Lab Results   Component Value Date    CRP 32.3 (H) 01/16/2023     Lab Results   Component Value Date    MICHELLE POSITIVE (A) 06/12/2019    SEDRATE 40 (H) 01/16/2023     Lab Results   Component Value Date    CKTOTAL 2,492 (H) 01/16/2023     Lab Results   Component Value Date    TSH 2.40 07/26/2018     Lab Results   Component Value Date    VITD25 31.3 07/23/2022         Radiology Review:     CT chest 5/2021-lower lobe predominant reticular linear opacities with honeycombing and traction bronchiectasis consistent with UIP. Total time >40 minutes- reviewing medical records & pre charting on the same day of visit,  history taking, exam, explaining medical diagnosis, management plan, ordering labs, filling medications, communicating findings with other providers and medical documentation in EHR.

## 2023-01-30 NOTE — ASSESSMENT & PLAN NOTE
Discussed etiology no acute infection  Antibiotic not indicated, discussed rationale of not giving antibiotics due to, but not limited to, side effects and build up of resistance. Advised if fever develops, symptoms worsen, or fail to improve with symptom management to return to office. Will see improvement likely from increased prednisone that is being prescribed by rheum.

## 2023-01-30 NOTE — PROGRESS NOTES
Linda Hays (:  1965) is a 62 y.o. female,Established patient, here for evaluation of the following chief complaint(s):  Otalgia (Right ear)      ASSESSMENT/PLAN:  1. Acute dysfunction of right eustachian tube  Assessment & Plan:   Discussed etiology no acute infection  Antibiotic not indicated, discussed rationale of not giving antibiotics due to, but not limited to, side effects and build up of resistance. Advised if fever develops, symptoms worsen, or fail to improve with symptom management to return to office. Will see improvement likely from increased prednisone that is being prescribed by rheum. 2. Antisynthetase syndrome ([facility] [address]  Assessment & Plan:   Monitored by specialist- no acute findings meriting change in the plan  3. ILD (interstitial lung disease) ([facility] [address]  Assessment & Plan:   Monitored by specialist- no acute findings meriting change in the plan    No follow-ups on file. SUBJECTIVE/OBJECTIVE:  Patient in the office today with right ear pain and sinus drainage with post nasal drip. She has been taking flonase as prescribed and she is noticing some improvement, but the ear pain in there right ear was especially sore the other day when she was eating. She felt a sharp pain with chewing for about two hours, then completely went away. She has RA and is followed by Rheum. She has not had any fevers. No other shortness of breath or difficulty breathing. Current Outpatient Medications   Medication Sig Dispense Refill    predniSONE (DELTASONE) 2.5 MG tablet TAKE 1 TABLET BY MOUTH IN  THE MORNING 90 tablet 0    clobetasol (TEMOVATE) 0.05 % external solution Apply to itchy areas of scalp for 2 weeks or until improved.  50 mL 2    mycophenolate (CELLCEPT) 500 MG tablet TAKE 3 TABLETS BY MOUTH  TWICE DAILY 540 tablet 0    sulfamethoxazole-trimethoprim (BACTRIM DS) 800-160 MG per tablet Take 1 tab three times a week 36 tablet 2    levothyroxine (SYNTHROID) 100 MCG tablet TAKE 1 TABLET BY MOUTH DAILY 90 tablet 3    ibuprofen (ADVIL;MOTRIN) 600 MG tablet Take 1 tablet by mouth every 6 hours as needed for Pain 60 tablet 1    predniSONE (DELTASONE) 10 MG tablet 2 tab po daily x 7 days. 1.5 tab po daily x 7 days. 1 tab po daily x 7 days. 1/2 tab po daily 7 days and stop. (Patient not taking: Reported on 1/30/2023) 35 tablet 0     No current facility-administered medications for this visit. Review of Systems   Constitutional:  Negative for chills, fatigue and fever. HENT:  Positive for ear pain. Negative for congestion, postnasal drip, rhinorrhea, sinus pressure, sneezing and sore throat. Eyes:  Negative for redness and itching. Respiratory:  Negative for cough, chest tightness, shortness of breath and wheezing. Cardiovascular:  Negative for chest pain and palpitations. Gastrointestinal:  Negative for abdominal pain, blood in stool, constipation, diarrhea, nausea and vomiting. Endocrine: Negative for cold intolerance and heat intolerance. Genitourinary:  Negative for difficulty urinating, dysuria, flank pain, frequency, hematuria and urgency. Musculoskeletal:  Negative for arthralgias, back pain, joint swelling and myalgias. Skin:  Negative for color change, pallor, rash and wound. Allergic/Immunologic: Negative for environmental allergies and food allergies. Neurological:  Negative for dizziness, seizures, syncope, weakness, light-headedness, numbness and headaches. Hematological:  Negative for adenopathy. Does not bruise/bleed easily. Psychiatric/Behavioral:  Negative for confusion, sleep disturbance and suicidal ideas. The patient is not nervous/anxious and is not hyperactive. Vitals:    01/30/23 1225   BP: 126/78   Site: Left Upper Arm   Position: Sitting   Cuff Size: Medium Adult   Pulse: 78   Temp: 98.3 °F (36.8 °C)   SpO2: 97%   Weight: 147 lb (66.7 kg)   Height: 5' (1.524 m)       Physical Exam  Constitutional:       Appearance: Normal appearance.  She is well-developed. HENT:      Head: Normocephalic and atraumatic. Right Ear: Hearing normal.      Left Ear: Hearing normal.      Nose: No mucosal edema. Right Sinus: No maxillary sinus tenderness or frontal sinus tenderness. Left Sinus: No maxillary sinus tenderness or frontal sinus tenderness. Mouth/Throat: Tonsils: No tonsillar abscesses. Eyes:      Extraocular Movements: Extraocular movements intact. Pupils: Pupils are equal, round, and reactive to light. Cardiovascular:      Rate and Rhythm: Normal rate and regular rhythm. Pulses: Normal pulses. Heart sounds: Normal heart sounds. Pulmonary:      Effort: Pulmonary effort is normal.      Breath sounds: Normal breath sounds. Lymphadenopathy:      Head:      Right side of head: No submental, submandibular, tonsillar, preauricular, posterior auricular or occipital adenopathy. Left side of head: No submental, submandibular, tonsillar, preauricular, posterior auricular or occipital adenopathy. Skin:     General: Skin is warm and dry. Neurological:      Mental Status: She is alert. Psychiatric:         Mood and Affect: Mood normal.         Behavior: Behavior normal.               An electronic signature was used to authenticate this note.     --Soledad Mandujano, APRN - CNP

## 2023-01-31 ENCOUNTER — TELEPHONE (OUTPATIENT)
Dept: DERMATOLOGY | Age: 58
End: 2023-01-31

## 2023-01-31 NOTE — TELEPHONE ENCOUNTER
Talked with Dr. Dane Graham about patient. They are ordering blood work for sarcoidosis and have increased pred dosing temporarily while awaiting Rituxan. Tissue cultures are negative to date (done on 1/26/2023). We discussed waiting a few more weeks until AFB/fungal have had time to culture out prior to doing next dose of Rituxan. Overall suspect granulmatous rxn on biopsy likely related to dupixent reaction, although not reported in literature that I see. No systemic symptoms per patient or other signs of infection. Nodules on arms, legs are getting better with only stopping/holding dupixent.     Would stop dupixent and continue to follow cultures as above    Jorden Lezama MD

## 2023-02-02 ENCOUNTER — TELEPHONE (OUTPATIENT)
Dept: RHEUMATOLOGY | Age: 58
End: 2023-02-02

## 2023-02-02 NOTE — TELEPHONE ENCOUNTER
Spoke with patient and advised of all information.  Infusion orders were faxed to [facility]

## 2023-02-02 NOTE — TELEPHONE ENCOUNTER
----- Message from Bonnielee Denver, MD sent at 2/1/2023  5:27 PM EST -----   Please let patient know that I had discussed with dermatology. Okay to schedule rituximab next weekend-February 10/11.

## 2023-02-07 ENCOUNTER — TELEPHONE (OUTPATIENT)
Dept: DERMATOLOGY | Age: 58
End: 2023-02-07

## 2023-02-07 NOTE — TELEPHONE ENCOUNTER
Donte Krause spoke with Laura Brooks informed her that patient had allergic reaction to [address] so Dr. Opal Castro is cancelling prescription at this time

## 2023-02-09 DIAGNOSIS — R21 RASH AND OTHER NONSPECIFIC SKIN ERUPTION: ICD-10-CM

## 2023-02-09 RX ORDER — CLOBETASOL PROPIONATE 0.46 MG/ML
SOLUTION TOPICAL
Qty: 50 ML | Refills: 3 | Status: SHIPPED | OUTPATIENT
Start: 2023-02-09

## 2023-02-09 NOTE — PROGRESS NOTES
Called pt to review that culture results from 1/26 are still pending for AfB/fungal and that these will take 4 weeks until final result. Spoke with Jorge A Smiling prior and both in agreement that her nodules and granulomatous rxn were 2/2 dupixent. Most likely. Have been improving since last visit without new areas since stopping dupixent, increasing oral prednisone, and doing Rituxan. Will cont to f/u cultures. Have notified pharmacy that we will not be doing dupixent any further given likely allergic reaction.  Refilled clobetasol soln to Angela Hanna MD

## 2023-02-22 DIAGNOSIS — E03.9 ACQUIRED HYPOTHYROIDISM: ICD-10-CM

## 2023-02-23 RX ORDER — LEVOTHYROXINE SODIUM 0.1 MG/1
TABLET ORAL
Qty: 90 TABLET | Refills: 3 | Status: SHIPPED | OUTPATIENT
Start: 2023-02-23

## 2023-03-02 DIAGNOSIS — E03.9 ACQUIRED HYPOTHYROIDISM: ICD-10-CM

## 2023-03-02 RX ORDER — PREDNISONE 2.5 MG
2.5 TABLET ORAL DAILY
Qty: 90 TABLET | Refills: 0 | Status: SHIPPED | OUTPATIENT
Start: 2023-03-02

## 2023-03-02 RX ORDER — SULFAMETHOXAZOLE AND TRIMETHOPRIM 800; 160 MG/1; MG/1
TABLET ORAL
Qty: 36 TABLET | Refills: 2 | Status: SHIPPED | OUTPATIENT
Start: 2023-03-02

## 2023-03-02 RX ORDER — LEVOTHYROXINE SODIUM 0.1 MG/1
TABLET ORAL
Qty: 90 TABLET | Refills: 3 | Status: SHIPPED | OUTPATIENT
Start: 2023-03-02

## 2023-04-17 ENCOUNTER — OFFICE VISIT (OUTPATIENT)
Dept: FAMILY MEDICINE CLINIC | Age: 58
End: 2023-04-17
Payer: COMMERCIAL

## 2023-04-17 VITALS
TEMPERATURE: 97.3 F | WEIGHT: 138.8 LBS | OXYGEN SATURATION: 96 % | HEIGHT: 60 IN | SYSTOLIC BLOOD PRESSURE: 126 MMHG | DIASTOLIC BLOOD PRESSURE: 72 MMHG | HEART RATE: 68 BPM | BODY MASS INDEX: 27.25 KG/M2

## 2023-04-17 DIAGNOSIS — M25.511 ARTHRALGIA OF RIGHT SHOULDER REGION: ICD-10-CM

## 2023-04-17 DIAGNOSIS — J84.9 ILD (INTERSTITIAL LUNG DISEASE) (HCC): ICD-10-CM

## 2023-04-17 DIAGNOSIS — D89.89 ANTISYNTHETASE SYNDROME (HCC): ICD-10-CM

## 2023-04-17 DIAGNOSIS — M19.90 INFLAMMATORY ARTHRITIS: ICD-10-CM

## 2023-04-17 PROBLEM — H69.81 ACUTE DYSFUNCTION OF RIGHT EUSTACHIAN TUBE: Status: RESOLVED | Noted: 2023-01-30 | Resolved: 2023-04-17

## 2023-04-17 PROBLEM — H69.91 ACUTE DYSFUNCTION OF RIGHT EUSTACHIAN TUBE: Status: RESOLVED | Noted: 2023-01-30 | Resolved: 2023-04-17

## 2023-04-17 PROBLEM — J01.10 ACUTE NON-RECURRENT FRONTAL SINUSITIS: Status: RESOLVED | Noted: 2022-12-14 | Resolved: 2023-04-17

## 2023-04-17 PROBLEM — U07.1 COVID-19: Status: RESOLVED | Noted: 2022-10-25 | Resolved: 2023-04-17

## 2023-04-17 PROCEDURE — 1036F TOBACCO NON-USER: CPT | Performed by: NURSE PRACTITIONER

## 2023-04-17 PROCEDURE — 99214 OFFICE O/P EST MOD 30 MIN: CPT | Performed by: NURSE PRACTITIONER

## 2023-04-17 PROCEDURE — G8427 DOCREV CUR MEDS BY ELIG CLIN: HCPCS | Performed by: NURSE PRACTITIONER

## 2023-04-17 PROCEDURE — 3017F COLORECTAL CA SCREEN DOC REV: CPT | Performed by: NURSE PRACTITIONER

## 2023-04-17 PROCEDURE — G8419 CALC BMI OUT NRM PARAM NOF/U: HCPCS | Performed by: NURSE PRACTITIONER

## 2023-04-17 SDOH — ECONOMIC STABILITY: FOOD INSECURITY: WITHIN THE PAST 12 MONTHS, YOU WORRIED THAT YOUR FOOD WOULD RUN OUT BEFORE YOU GOT MONEY TO BUY MORE.: NEVER TRUE

## 2023-04-17 SDOH — ECONOMIC STABILITY: FOOD INSECURITY: WITHIN THE PAST 12 MONTHS, THE FOOD YOU BOUGHT JUST DIDN'T LAST AND YOU DIDN'T HAVE MONEY TO GET MORE.: NEVER TRUE

## 2023-04-17 SDOH — ECONOMIC STABILITY: INCOME INSECURITY: HOW HARD IS IT FOR YOU TO PAY FOR THE VERY BASICS LIKE FOOD, HOUSING, MEDICAL CARE, AND HEATING?: NOT HARD AT ALL

## 2023-04-17 SDOH — ECONOMIC STABILITY: HOUSING INSECURITY
IN THE LAST 12 MONTHS, WAS THERE A TIME WHEN YOU DID NOT HAVE A STEADY PLACE TO SLEEP OR SLEPT IN A SHELTER (INCLUDING NOW)?: NO

## 2023-04-17 ASSESSMENT — PATIENT HEALTH QUESTIONNAIRE - PHQ9
SUM OF ALL RESPONSES TO PHQ9 QUESTIONS 1 & 2: 0
1. LITTLE INTEREST OR PLEASURE IN DOING THINGS: 0
SUM OF ALL RESPONSES TO PHQ QUESTIONS 1-9: 0
2. FEELING DOWN, DEPRESSED OR HOPELESS: 0

## 2023-04-17 NOTE — PROGRESS NOTES
submental, submandibular, tonsillar, preauricular, posterior auricular or occipital adenopathy. Skin:     General: Skin is warm and dry. Neurological:      Mental Status: She is alert. Psychiatric:         Mood and Affect: Mood normal.         Behavior: Behavior normal.               An electronic signature was used to authenticate this note.     --REGINA Verdin - CNP

## 2023-04-17 NOTE — ASSESSMENT & PLAN NOTE
After exercise still likely overuse injury with bursitis recommend resting and using anti-inflammatories as needed

## 2023-06-26 ENCOUNTER — OFFICE VISIT (OUTPATIENT)
Dept: DERMATOLOGY | Age: 58
End: 2023-06-26
Payer: COMMERCIAL

## 2023-06-26 DIAGNOSIS — L20.89 OTHER ATOPIC DERMATITIS: Primary | ICD-10-CM

## 2023-06-26 DIAGNOSIS — R21 RASH AND OTHER NONSPECIFIC SKIN ERUPTION: ICD-10-CM

## 2023-06-26 PROCEDURE — 1036F TOBACCO NON-USER: CPT | Performed by: INTERNAL MEDICINE

## 2023-06-26 PROCEDURE — 3017F COLORECTAL CA SCREEN DOC REV: CPT | Performed by: INTERNAL MEDICINE

## 2023-06-26 PROCEDURE — G8419 CALC BMI OUT NRM PARAM NOF/U: HCPCS | Performed by: INTERNAL MEDICINE

## 2023-06-26 PROCEDURE — G8427 DOCREV CUR MEDS BY ELIG CLIN: HCPCS | Performed by: INTERNAL MEDICINE

## 2023-06-26 PROCEDURE — 99213 OFFICE O/P EST LOW 20 MIN: CPT | Performed by: INTERNAL MEDICINE

## 2023-06-26 RX ORDER — CLOBETASOL PROPIONATE 0.46 MG/ML
SOLUTION TOPICAL
Qty: 50 ML | Refills: 6 | Status: SHIPPED | OUTPATIENT
Start: 2023-06-26

## 2023-06-26 RX ORDER — CLOBETASOL PROPIONATE 0.46 MG/ML
SOLUTION TOPICAL
Qty: 50 ML | Refills: 6 | Status: SHIPPED | OUTPATIENT
Start: 2023-06-26 | End: 2023-06-26

## 2023-08-09 ENCOUNTER — TELEPHONE (OUTPATIENT)
Dept: FAMILY MEDICINE CLINIC | Age: 58
End: 2023-08-09

## 2023-08-09 DIAGNOSIS — Z00.00 ROUTINE GENERAL MEDICAL EXAMINATION AT A HEALTH CARE FACILITY: Primary | ICD-10-CM

## 2023-08-09 NOTE — TELEPHONE ENCOUNTER
----- Message from Nimesh Gamez sent at 8/9/2023 11:17 AM EDT -----  Subject: Referral Request    Reason for referral request? Would like to get the labs for her physical   done before her apt on 8/23 would like to have her thyroid checked as   well. Provider patient wants to be referred to(if known):     Provider Phone Number(if known): Additional Information for Provider?   ---------------------------------------------------------------------------  --------------  [address]    [phone];  Do not leave any message, patient will call back for answer  ---------------------------------------------------------------------------  --------------

## 2023-08-10 NOTE — TELEPHONE ENCOUNTER
LMOM relaying information regarding labs. Provided pt with office call back number for any additional questions or concerns.

## 2023-08-12 DIAGNOSIS — Z00.00 ROUTINE GENERAL MEDICAL EXAMINATION AT A HEALTH CARE FACILITY: ICD-10-CM

## 2023-08-12 LAB
ALBUMIN SERPL-MCNC: 4.1 G/DL (ref 3.4–5)
ALBUMIN/GLOB SERPL: 2.1 {RATIO} (ref 1.1–2.2)
ALP SERPL-CCNC: 83 U/L (ref 40–129)
ALT SERPL-CCNC: 16 U/L (ref 10–40)
ANION GAP SERPL CALCULATED.3IONS-SCNC: 9 MMOL/L (ref 3–16)
AST SERPL-CCNC: 22 U/L (ref 15–37)
BASOPHILS # BLD: 0.1 K/UL (ref 0–0.2)
BASOPHILS NFR BLD: 1 %
BILIRUB SERPL-MCNC: 0.3 MG/DL (ref 0–1)
BUN SERPL-MCNC: 13 MG/DL (ref 7–20)
CALCIUM SERPL-MCNC: 9 MG/DL (ref 8.3–10.6)
CHLORIDE SERPL-SCNC: 105 MMOL/L (ref 99–110)
CHOLEST SERPL-MCNC: 167 MG/DL (ref 0–199)
CO2 SERPL-SCNC: 27 MMOL/L (ref 21–32)
CREAT SERPL-MCNC: 0.7 MG/DL (ref 0.6–1.1)
DEPRECATED RDW RBC AUTO: 14.2 % (ref 12.4–15.4)
EOSINOPHIL # BLD: 0.3 K/UL (ref 0–0.6)
EOSINOPHIL NFR BLD: 3.9 %
GFR SERPLBLD CREATININE-BSD FMLA CKD-EPI: >60 ML/MIN/{1.73_M2}
GLUCOSE SERPL-MCNC: 85 MG/DL (ref 70–99)
HCT VFR BLD AUTO: 40.1 % (ref 36–48)
HDLC SERPL-MCNC: 51 MG/DL (ref 40–60)
HGB BLD-MCNC: 13.5 G/DL (ref 12–16)
LDL CHOLESTEROL CALCULATED: 89 MG/DL
LYMPHOCYTES # BLD: 0.9 K/UL (ref 1–5.1)
LYMPHOCYTES NFR BLD: 13.2 %
MCH RBC QN AUTO: 29.9 PG (ref 26–34)
MCHC RBC AUTO-ENTMCNC: 33.5 G/DL (ref 31–36)
MCV RBC AUTO: 89.1 FL (ref 80–100)
MONOCYTES # BLD: 0.7 K/UL (ref 0–1.3)
MONOCYTES NFR BLD: 9.4 %
NEUTROPHILS # BLD: 5.1 K/UL (ref 1.7–7.7)
NEUTROPHILS NFR BLD: 72.5 %
PLATELET # BLD AUTO: 303 K/UL (ref 135–450)
PMV BLD AUTO: 8.5 FL (ref 5–10.5)
POTASSIUM SERPL-SCNC: 4.1 MMOL/L (ref 3.5–5.1)
PROT SERPL-MCNC: 6.1 G/DL (ref 6.4–8.2)
RBC # BLD AUTO: 4.5 M/UL (ref 4–5.2)
SODIUM SERPL-SCNC: 141 MMOL/L (ref 136–145)
TRIGL SERPL-MCNC: 137 MG/DL (ref 0–150)
TSH SERPL DL<=0.005 MIU/L-ACNC: 0.64 UIU/ML (ref 0.27–4.2)
VLDLC SERPL CALC-MCNC: 27 MG/DL
WBC # BLD AUTO: 7 K/UL (ref 4–11)

## 2023-08-23 ENCOUNTER — OFFICE VISIT (OUTPATIENT)
Dept: FAMILY MEDICINE CLINIC | Age: 58
End: 2023-08-23
Payer: COMMERCIAL

## 2023-08-23 VITALS
WEIGHT: 142 LBS | BODY MASS INDEX: 27.73 KG/M2 | TEMPERATURE: 96.9 F | OXYGEN SATURATION: 98 % | SYSTOLIC BLOOD PRESSURE: 118 MMHG | HEART RATE: 72 BPM | DIASTOLIC BLOOD PRESSURE: 68 MMHG

## 2023-08-23 DIAGNOSIS — Z00.00 ENCOUNTER FOR WELL ADULT EXAM WITHOUT ABNORMAL FINDINGS: Primary | ICD-10-CM

## 2023-08-23 DIAGNOSIS — J84.9 ILD (INTERSTITIAL LUNG DISEASE) (HCC): ICD-10-CM

## 2023-08-23 DIAGNOSIS — D89.89 ANTISYNTHETASE SYNDROME (HCC): ICD-10-CM

## 2023-08-23 DIAGNOSIS — Z00.00 ROUTINE GENERAL MEDICAL EXAMINATION AT A HEALTH CARE FACILITY: ICD-10-CM

## 2023-08-23 PROCEDURE — 99396 PREV VISIT EST AGE 40-64: CPT | Performed by: NURSE PRACTITIONER

## 2023-08-23 ASSESSMENT — ENCOUNTER SYMPTOMS
EYE REDNESS: 0
DIARRHEA: 0
VOMITING: 0
SHORTNESS OF BREATH: 0
RHINORRHEA: 0
ABDOMINAL PAIN: 0
BACK PAIN: 0
SORE THROAT: 0
SINUS PRESSURE: 0
EYE ITCHING: 0
BLOOD IN STOOL: 0
WHEEZING: 0
CHEST TIGHTNESS: 0
NAUSEA: 0
CONSTIPATION: 0
COLOR CHANGE: 0
COUGH: 0

## 2023-08-23 NOTE — PROGRESS NOTES
Well Adult Note  Name: Lacey Wills Date: 2023   MRN: [id number] Sex: Female   Age: 62 y.o. Ethnicity: Non- / Non    : 1965 Race: White (non-)      Linda Hays is here for well adult exam.  History:  She is doing well without any issues or concerns. She will be following up with her rheumatologist for an injection soon. She is taking all medications as prescribed without any issues or concerns. She has paperwork to be completed. She has already completed her labs which have been reviewed with her which are stable. Review of Systems   Constitutional:  Negative for chills, fatigue and fever. HENT:  Negative for congestion, ear pain, postnasal drip, rhinorrhea, sinus pressure, sneezing and sore throat. Eyes:  Negative for redness and itching. Respiratory:  Negative for cough, chest tightness, shortness of breath and wheezing. Cardiovascular:  Negative for chest pain and palpitations. Gastrointestinal:  Negative for abdominal pain, blood in stool, constipation, diarrhea, nausea and vomiting. Endocrine: Negative for cold intolerance and heat intolerance. Genitourinary:  Negative for difficulty urinating, dysuria, flank pain, frequency, hematuria and urgency. Musculoskeletal:  Negative for arthralgias, back pain, joint swelling and myalgias. Skin:  Negative for color change, pallor, rash and wound. Allergic/Immunologic: Negative for environmental allergies and food allergies. Neurological:  Negative for dizziness, seizures, syncope, weakness, light-headedness, numbness and headaches. Hematological:  Negative for adenopathy. Does not bruise/bleed easily. Psychiatric/Behavioral:  Negative for confusion, sleep disturbance and suicidal ideas. The patient is not nervous/anxious and is not hyperactive.       Allergies   Allergen Reactions    Dupixent [Dupilumab] Rash     Granulomatous drug reaction     Wasp Venom Other (See Comments)

## 2023-09-15 NOTE — TELEPHONE ENCOUNTER
Dr Mert Meza patient  Jennifer c/b #[phone]  Jennifer from pharm stated  Pt [facility] med has been denied for PA can be appealed.   Please c/b to discuss Pt has walker, cane, and transport chair

## 2023-09-22 PROBLEM — Z00.00 ROUTINE GENERAL MEDICAL EXAMINATION AT A HEALTH CARE FACILITY: Status: RESOLVED | Noted: 2019-08-30 | Resolved: 2023-09-22

## 2023-10-27 ENCOUNTER — TELEPHONE (OUTPATIENT)
Dept: PULMONOLOGY | Age: 58
End: 2023-10-27

## 2023-10-27 DIAGNOSIS — J84.9 ILD (INTERSTITIAL LUNG DISEASE) (HCC): Primary | ICD-10-CM

## 2023-10-27 NOTE — TELEPHONE ENCOUNTER
She states that she is a pt of Dr Carmen Andino and has been here to see Dr Murry in the past(08/23/21). She states that Dr Carmen Andino wants her to get a PFT and can not order it since she is not with [facility] anymore. Linda states that Dr Lisa Bravo has ordered this in the past and she just f/u with Dr Carmen Andino to get results. I told her I did not think we could do that. I made an appt for her on 12/06/23. She had PFT done 8/20/22, states she usually gets the 2 a yr for ILD. Please Advise.

## 2023-11-17 ENCOUNTER — HOSPITAL ENCOUNTER (OUTPATIENT)
Dept: PULMONOLOGY | Age: 58
Discharge: HOME | End: 2023-11-17
Attending: INTERNAL MEDICINE
Payer: COMMERCIAL

## 2023-11-17 DIAGNOSIS — J84.9 ILD (INTERSTITIAL LUNG DISEASE) (HCC): ICD-10-CM

## 2023-11-17 PROCEDURE — 94618 PULMONARY STRESS TESTING: CPT

## 2023-11-17 PROCEDURE — 94010 BREATHING CAPACITY TEST: CPT

## 2023-11-17 PROCEDURE — 94729 DIFFUSING CAPACITY: CPT

## 2023-11-17 PROCEDURE — 94761 N-INVAS EAR/PLS OXIMETRY MLT: CPT

## 2023-11-17 PROCEDURE — 94726 PLETHYSMOGRAPHY LUNG VOLUMES: CPT

## 2023-11-17 PROCEDURE — 94664 DEMO&/EVAL PT USE INHALER: CPT

## 2024-04-11 ENCOUNTER — TELEPHONE (OUTPATIENT)
Dept: FAMILY MEDICINE CLINIC | Age: 59
End: 2024-04-11

## 2024-04-11 DIAGNOSIS — E03.9 ACQUIRED HYPOTHYROIDISM: ICD-10-CM

## 2024-04-11 RX ORDER — LEVOTHYROXINE SODIUM 0.1 MG/1
TABLET ORAL
Qty: 90 TABLET | Refills: 3 | Status: SHIPPED | OUTPATIENT
Start: 2024-04-11

## 2024-04-11 NOTE — TELEPHONE ENCOUNTER
Pt needs a refill AND SHE IS OUT OF MEDICATION. She is asking for a 30 DAY SUPPLY to be sent to [facility] pharmacy [address] [place] [address]. THEN SEND 90 DAY SUPPLY WITH REFILLS TO [facility]      LOV (PHYSICAL) 23    levothyroxine (SYNTHROID) 100 MCG tablet [[id number]]    Order Details  Dose, Route, Frequency: As Directed   Dispense Quantity: 90 tablet Refills: 3    Note to Pharmacy: Requesting 1 year supply         Si tablet daily         Start Date: 23 End Date: --   Written Date: 23 Expiration Date: 24       Associated Diagnoses: Acquired hypothyroidism [E03.9]   Original Order: levothyroxine (SYNTHROID) 100 MCG tablet [[id number]]   Providers    Authorizing Provider: Trinity Allen APRN - CNP NPI: [id number]   Ordering User: Trinity Allen APRN - CNP          Pharmacy    [facility] - [place], [place] - [facility] [address] [phone] - F [phone]  [address] [place] [address]  Phone: [phone]  Fax: [phone]

## 2024-06-24 ENCOUNTER — TELEPHONE (OUTPATIENT)
Dept: FAMILY MEDICINE CLINIC | Age: 59
End: 2024-06-24

## 2024-06-25 ENCOUNTER — TELEPHONE (OUTPATIENT)
Dept: FAMILY MEDICINE CLINIC | Age: 59
End: 2024-06-25

## 2024-06-25 DIAGNOSIS — Z00.00 ROUTINE GENERAL MEDICAL EXAMINATION AT A HEALTH CARE FACILITY: Primary | ICD-10-CM

## 2024-06-25 NOTE — TELEPHONE ENCOUNTER
Patient would like orders put in for bloodwork for physical scheduled on 7/8. Please notify patient when orders are in

## 2024-06-29 DIAGNOSIS — Z00.00 ROUTINE GENERAL MEDICAL EXAMINATION AT A HEALTH CARE FACILITY: ICD-10-CM

## 2024-06-29 LAB
25(OH)D3 SERPL-MCNC: 26 NG/ML
ALBUMIN SERPL-MCNC: 4.2 G/DL (ref 3.4–5)
ALBUMIN/GLOB SERPL: 1.8 {RATIO} (ref 1.1–2.2)
ALP SERPL-CCNC: 101 U/L (ref 40–129)
ALT SERPL-CCNC: 9 U/L (ref 10–40)
ANION GAP SERPL CALCULATED.3IONS-SCNC: 9 MMOL/L (ref 3–16)
AST SERPL-CCNC: 13 U/L (ref 15–37)
BILIRUB SERPL-MCNC: 0.4 MG/DL (ref 0–1)
BUN SERPL-MCNC: 11 MG/DL (ref 7–20)
CALCIUM SERPL-MCNC: 9.3 MG/DL (ref 8.3–10.6)
CHLORIDE SERPL-SCNC: 106 MMOL/L (ref 99–110)
CHOLEST SERPL-MCNC: 155 MG/DL (ref 0–199)
CO2 SERPL-SCNC: 28 MMOL/L (ref 21–32)
CREAT SERPL-MCNC: 0.7 MG/DL (ref 0.6–1.1)
DEPRECATED RDW RBC AUTO: 13.3 % (ref 12.4–15.4)
GFR SERPLBLD CREATININE-BSD FMLA CKD-EPI: >90 ML/MIN/{1.73_M2}
GLUCOSE SERPL-MCNC: 83 MG/DL (ref 70–99)
HCT VFR BLD AUTO: 38.9 % (ref 36–48)
HDLC SERPL-MCNC: 58 MG/DL (ref 40–60)
HGB BLD-MCNC: 13 G/DL (ref 12–16)
LDL CHOLESTEROL: 77 MG/DL
MCH RBC QN AUTO: 30 PG (ref 26–34)
MCHC RBC AUTO-ENTMCNC: 33.4 G/DL (ref 31–36)
MCV RBC AUTO: 90 FL (ref 80–100)
PLATELET # BLD AUTO: 315 K/UL (ref 135–450)
PMV BLD AUTO: 8.9 FL (ref 5–10.5)
POTASSIUM SERPL-SCNC: 4.2 MMOL/L (ref 3.5–5.1)
PROT SERPL-MCNC: 6.6 G/DL (ref 6.4–8.2)
RBC # BLD AUTO: 4.32 M/UL (ref 4–5.2)
SODIUM SERPL-SCNC: 143 MMOL/L (ref 136–145)
TRIGL SERPL-MCNC: 98 MG/DL (ref 0–150)
TSH SERPL DL<=0.005 MIU/L-ACNC: 0.45 UIU/ML (ref 0.27–4.2)
VLDLC SERPL CALC-MCNC: 20 MG/DL
WBC # BLD AUTO: 5.9 K/UL (ref 4–11)

## 2024-07-08 ENCOUNTER — OFFICE VISIT (OUTPATIENT)
Dept: FAMILY MEDICINE CLINIC | Age: 59
End: 2024-07-08
Payer: COMMERCIAL

## 2024-07-08 VITALS
BODY MASS INDEX: 29.17 KG/M2 | WEIGHT: 148.6 LBS | HEIGHT: 60 IN | SYSTOLIC BLOOD PRESSURE: 134 MMHG | HEART RATE: 72 BPM | DIASTOLIC BLOOD PRESSURE: 84 MMHG | OXYGEN SATURATION: 99 % | TEMPERATURE: 97.1 F

## 2024-07-08 DIAGNOSIS — E78.5 HYPERLIPIDEMIA, UNSPECIFIED HYPERLIPIDEMIA TYPE: ICD-10-CM

## 2024-07-08 DIAGNOSIS — E03.9 ACQUIRED HYPOTHYROIDISM: ICD-10-CM

## 2024-07-08 DIAGNOSIS — E03.4 HYPOTHYROIDISM DUE TO ACQUIRED ATROPHY OF THYROID: ICD-10-CM

## 2024-07-08 DIAGNOSIS — K21.9 GASTROESOPHAGEAL REFLUX DISEASE WITHOUT ESOPHAGITIS: ICD-10-CM

## 2024-07-08 DIAGNOSIS — Z00.00 ROUTINE GENERAL MEDICAL EXAMINATION AT A HEALTH CARE FACILITY: Primary | ICD-10-CM

## 2024-07-08 DIAGNOSIS — M19.90 INFLAMMATORY ARTHRITIS: ICD-10-CM

## 2024-07-08 PROCEDURE — 99213 OFFICE O/P EST LOW 20 MIN: CPT | Performed by: NURSE PRACTITIONER

## 2024-07-08 PROCEDURE — 1036F TOBACCO NON-USER: CPT | Performed by: NURSE PRACTITIONER

## 2024-07-08 PROCEDURE — 3017F COLORECTAL CA SCREEN DOC REV: CPT | Performed by: NURSE PRACTITIONER

## 2024-07-08 PROCEDURE — G8427 DOCREV CUR MEDS BY ELIG CLIN: HCPCS | Performed by: NURSE PRACTITIONER

## 2024-07-08 PROCEDURE — 99396 PREV VISIT EST AGE 40-64: CPT | Performed by: NURSE PRACTITIONER

## 2024-07-08 PROCEDURE — G8419 CALC BMI OUT NRM PARAM NOF/U: HCPCS | Performed by: NURSE PRACTITIONER

## 2024-07-08 SDOH — ECONOMIC STABILITY: FOOD INSECURITY: WITHIN THE PAST 12 MONTHS, THE FOOD YOU BOUGHT JUST DIDN'T LAST AND YOU DIDN'T HAVE MONEY TO GET MORE.: NEVER TRUE

## 2024-07-08 SDOH — ECONOMIC STABILITY: FOOD INSECURITY: WITHIN THE PAST 12 MONTHS, YOU WORRIED THAT YOUR FOOD WOULD RUN OUT BEFORE YOU GOT MONEY TO BUY MORE.: NEVER TRUE

## 2024-07-08 SDOH — ECONOMIC STABILITY: INCOME INSECURITY: HOW HARD IS IT FOR YOU TO PAY FOR THE VERY BASICS LIKE FOOD, HOUSING, MEDICAL CARE, AND HEATING?: NOT HARD AT ALL

## 2024-07-08 ASSESSMENT — PATIENT HEALTH QUESTIONNAIRE - PHQ9
1. LITTLE INTEREST OR PLEASURE IN DOING THINGS: NOT AT ALL
1. LITTLE INTEREST OR PLEASURE IN DOING THINGS: NOT AT ALL
2. FEELING DOWN, DEPRESSED OR HOPELESS: NOT AT ALL
SUM OF ALL RESPONSES TO PHQ QUESTIONS 1-9: 0
SUM OF ALL RESPONSES TO PHQ9 QUESTIONS 1 & 2: 0
SUM OF ALL RESPONSES TO PHQ QUESTIONS 1-9: 0
SUM OF ALL RESPONSES TO PHQ9 QUESTIONS 1 & 2: 0
2. FEELING DOWN, DEPRESSED OR HOPELESS: NOT AT ALL
SUM OF ALL RESPONSES TO PHQ QUESTIONS 1-9: 0
SUM OF ALL RESPONSES TO PHQ QUESTIONS 1-9: 0

## 2024-07-11 PROBLEM — Z00.00 ROUTINE GENERAL MEDICAL EXAMINATION AT A HEALTH CARE FACILITY: Status: ACTIVE | Noted: 2024-07-11

## 2024-07-11 PROBLEM — K21.9 GASTROESOPHAGEAL REFLUX DISEASE WITHOUT ESOPHAGITIS: Status: ACTIVE | Noted: 2024-07-11

## 2024-07-11 RX ORDER — LEVOTHYROXINE SODIUM 0.1 MG/1
TABLET ORAL
Qty: 90 TABLET | Refills: 3 | Status: SHIPPED | OUTPATIENT
Start: 2024-07-11

## 2024-07-11 ASSESSMENT — ENCOUNTER SYMPTOMS
SHORTNESS OF BREATH: 0
EYE REDNESS: 0
WHEEZING: 0
SORE THROAT: 0
EYE ITCHING: 0
SINUS PRESSURE: 0
NAUSEA: 0
CONSTIPATION: 0
COLOR CHANGE: 0
BACK PAIN: 0
VOMITING: 0
DIARRHEA: 0
COUGH: 1
RHINORRHEA: 0
BLOOD IN STOOL: 0
CHEST TIGHTNESS: 0
ABDOMINAL PAIN: 0

## 2024-07-11 NOTE — ASSESSMENT & PLAN NOTE
Stable, controlled  No changes  Continue current treatment plan   Lab Results   Component Value Date    CHOL 166 07/29/2017    TRIG 105 07/29/2017    HDL 58 06/29/2024    LDL 77 06/29/2024    VLDL 20 06/29/2024

## 2024-07-11 NOTE — ASSESSMENT & PLAN NOTE
Stable, controlled  No changes  Continue current treatment plan   Lab Results   Component Value Date    TSH 0.45 06/29/2024

## 2024-07-11 NOTE — PROGRESS NOTES
Linda Hays (:  1965) is a 58 y.o. female,Established patient, here for evaluation of the following chief complaint(s):  Annual Exam      ASSESSMENT/PLAN:  1. Routine general medical examination at a health care facility  Assessment & Plan:   Physical exam complete during visit today.  Reviewed preventative care and health maintenance.  Orders placed as documented.    2. Acquired hypothyroidism  -     levothyroxine (SYNTHROID) 100 MCG tablet; 1 tablet daily, Disp-90 tablet, R-3Requesting 1 year supplyNormal  3. Hyperlipidemia, unspecified hyperlipidemia type  Assessment & Plan:   Stable, controlled  No changes  Continue current treatment plan   Lab Results   Component Value Date    CHOL 166 2017    TRIG 105 2017    HDL 58 2024    LDL 77 2024    VLDL 20 2024       4. Hypothyroidism due to acquired atrophy of thyroid  Assessment & Plan:   Stable, controlled  No changes  Continue current treatment plan   Lab Results   Component Value Date    TSH 0.45 2024       5. Inflammatory arthritis  Assessment & Plan:   Monitored by specialist- no acute findings meriting change in the plan  6. Gastroesophageal reflux disease without esophagitis  Assessment & Plan:  The pathophysiology of reflux is discussed.  Anti-reflux measures such as raising the head of the bed, avoiding tight clothing or belts, avoiding eating late at night and not lying down shortly after mealtime and achieving weight loss are discussed. Avoid ASA, NSAID's, caffeine, peppermints, alcohol and tobacco. OTC H2 blockers and/or antacids are often very helpful for PRN use. However, for persisting chronic or daily symptoms, prescription strength H2 blockers or a trial of PPI's are often used. Further recommendations to her: Rx for PPI is written. She should alert me if there are persistent symptoms, dysphagia, weight loss or GI bleeding. FUV is scheduled.         No follow-ups on

## 2024-07-11 NOTE — ASSESSMENT & PLAN NOTE
The pathophysiology of reflux is discussed.  Anti-reflux measures such as raising the head of the bed, avoiding tight clothing or belts, avoiding eating late at night and not lying down shortly after mealtime and achieving weight loss are discussed. Avoid ASA, NSAID's, caffeine, peppermints, alcohol and tobacco. OTC H2 blockers and/or antacids are often very helpful for PRN use. However, for persisting chronic or daily symptoms, prescription strength H2 blockers or a trial of PPI's are often used. Further recommendations to her: Rx for PPI is written. She should alert me if there are persistent symptoms, dysphagia, weight loss or GI bleeding. FUV is scheduled.

## 2024-08-10 PROBLEM — Z00.00 ROUTINE GENERAL MEDICAL EXAMINATION AT A HEALTH CARE FACILITY: Status: RESOLVED | Noted: 2024-07-11 | Resolved: 2024-08-10

## 2025-06-19 DIAGNOSIS — E03.9 ACQUIRED HYPOTHYROIDISM: ICD-10-CM

## 2025-06-19 RX ORDER — LEVOTHYROXINE SODIUM 100 UG/1
TABLET ORAL
Qty: 30 TABLET | Refills: 0 | Status: SHIPPED | OUTPATIENT
Start: 2025-06-19

## 2025-06-19 NOTE — TELEPHONE ENCOUNTER
Low Thyroids, needs blood work done.     She has an appt with [facility] 7/9/25, wants to get blood work done before the appt. Requesting that [facility] has this set up so she can get it done on the weekend.    Also would like a refill 90 days  levothyroxine (SYNTHROID) 100 MCG tablet [[id number]]

## 2025-06-30 ENCOUNTER — TELEPHONE (OUTPATIENT)
Dept: FAMILY MEDICINE CLINIC | Age: 60
End: 2025-06-30

## 2025-06-30 DIAGNOSIS — Z00.00 ROUTINE GENERAL MEDICAL EXAMINATION AT A HEALTH CARE FACILITY: Primary | ICD-10-CM

## 2025-06-30 DIAGNOSIS — E78.5 HYPERLIPIDEMIA, UNSPECIFIED HYPERLIPIDEMIA TYPE: ICD-10-CM

## 2025-06-30 DIAGNOSIS — E03.9 ACQUIRED HYPOTHYROIDISM: ICD-10-CM

## 2025-07-03 DIAGNOSIS — E78.5 HYPERLIPIDEMIA, UNSPECIFIED HYPERLIPIDEMIA TYPE: ICD-10-CM

## 2025-07-03 DIAGNOSIS — E03.9 ACQUIRED HYPOTHYROIDISM: ICD-10-CM

## 2025-07-03 DIAGNOSIS — Z00.00 ROUTINE GENERAL MEDICAL EXAMINATION AT A HEALTH CARE FACILITY: ICD-10-CM

## 2025-07-03 LAB
ALBUMIN SERPL-MCNC: 4.1 G/DL (ref 3.4–5)
ALBUMIN/GLOB SERPL: 1.9 {RATIO} (ref 1.1–2.2)
ALP SERPL-CCNC: 103 U/L (ref 40–129)
ALT SERPL-CCNC: 14 U/L (ref 10–40)
ANION GAP SERPL CALCULATED.3IONS-SCNC: 9 MMOL/L (ref 3–16)
AST SERPL-CCNC: 20 U/L (ref 15–37)
BASOPHILS # BLD: 0 K/UL (ref 0–0.2)
BASOPHILS NFR BLD: 0.7 %
BILIRUB SERPL-MCNC: 0.3 MG/DL (ref 0–1)
BUN SERPL-MCNC: 10 MG/DL (ref 7–20)
CALCIUM SERPL-MCNC: 9.5 MG/DL (ref 8.3–10.6)
CHLORIDE SERPL-SCNC: 104 MMOL/L (ref 99–110)
CHOLEST SERPL-MCNC: 158 MG/DL (ref 0–199)
CO2 SERPL-SCNC: 28 MMOL/L (ref 21–32)
CREAT SERPL-MCNC: 0.7 MG/DL (ref 0.6–1.1)
DEPRECATED RDW RBC AUTO: 13.6 % (ref 12.4–15.4)
EOSINOPHIL # BLD: 0.1 K/UL (ref 0–0.6)
EOSINOPHIL NFR BLD: 1.8 %
GFR SERPLBLD CREATININE-BSD FMLA CKD-EPI: >90 ML/MIN/{1.73_M2}
GLUCOSE SERPL-MCNC: 89 MG/DL (ref 70–99)
HCT VFR BLD AUTO: 39.5 % (ref 36–48)
HDLC SERPL-MCNC: 50 MG/DL (ref 40–60)
HGB BLD-MCNC: 12.9 G/DL (ref 12–16)
LDLC SERPL CALC-MCNC: 86 MG/DL
LYMPHOCYTES # BLD: 1.3 K/UL (ref 1–5.1)
LYMPHOCYTES NFR BLD: 20.6 %
MCH RBC QN AUTO: 29.1 PG (ref 26–34)
MCHC RBC AUTO-ENTMCNC: 32.6 G/DL (ref 31–36)
MCV RBC AUTO: 89.3 FL (ref 80–100)
MONOCYTES # BLD: 0.5 K/UL (ref 0–1.3)
MONOCYTES NFR BLD: 8.8 %
NEUTROPHILS # BLD: 4.2 K/UL (ref 1.7–7.7)
NEUTROPHILS NFR BLD: 68.1 %
PLATELET # BLD AUTO: 335 K/UL (ref 135–450)
PMV BLD AUTO: 8.9 FL (ref 5–10.5)
POTASSIUM SERPL-SCNC: 4.3 MMOL/L (ref 3.5–5.1)
PROT SERPL-MCNC: 6.3 G/DL (ref 6.4–8.2)
RBC # BLD AUTO: 4.42 M/UL (ref 4–5.2)
SODIUM SERPL-SCNC: 141 MMOL/L (ref 136–145)
T3 SERPL-MCNC: 1.21 NG/ML (ref 0.8–2)
T4 FREE SERPL-MCNC: 1.7 NG/DL (ref 0.9–1.8)
TRIGL SERPL-MCNC: 108 MG/DL (ref 0–150)
TSH SERPL DL<=0.005 MIU/L-ACNC: 0.11 UIU/ML (ref 0.27–4.2)
VLDLC SERPL CALC-MCNC: 22 MG/DL
WBC # BLD AUTO: 6.2 K/UL (ref 4–11)

## 2025-07-05 ENCOUNTER — RESULTS FOLLOW-UP (OUTPATIENT)
Dept: FAMILY MEDICINE CLINIC | Age: 60
End: 2025-07-05

## 2025-07-09 ENCOUNTER — OFFICE VISIT (OUTPATIENT)
Dept: FAMILY MEDICINE CLINIC | Age: 60
End: 2025-07-09
Payer: COMMERCIAL

## 2025-07-09 VITALS
TEMPERATURE: 96.9 F | OXYGEN SATURATION: 96 % | HEIGHT: 60 IN | HEART RATE: 73 BPM | SYSTOLIC BLOOD PRESSURE: 124 MMHG | BODY MASS INDEX: 28.66 KG/M2 | RESPIRATION RATE: 16 BRPM | DIASTOLIC BLOOD PRESSURE: 88 MMHG | WEIGHT: 146 LBS

## 2025-07-09 DIAGNOSIS — J84.9 ILD (INTERSTITIAL LUNG DISEASE) (HCC): ICD-10-CM

## 2025-07-09 DIAGNOSIS — L40.9 PSORIASIS: ICD-10-CM

## 2025-07-09 DIAGNOSIS — E03.4 HYPOTHYROIDISM DUE TO ACQUIRED ATROPHY OF THYROID: ICD-10-CM

## 2025-07-09 DIAGNOSIS — E78.5 HYPERLIPIDEMIA, UNSPECIFIED HYPERLIPIDEMIA TYPE: ICD-10-CM

## 2025-07-09 DIAGNOSIS — L20.84 INTRINSIC ECZEMA: ICD-10-CM

## 2025-07-09 DIAGNOSIS — K21.9 GASTROESOPHAGEAL REFLUX DISEASE WITHOUT ESOPHAGITIS: ICD-10-CM

## 2025-07-09 DIAGNOSIS — M19.90 INFLAMMATORY ARTHRITIS: ICD-10-CM

## 2025-07-09 DIAGNOSIS — M81.0 SENILE OSTEOPOROSIS: ICD-10-CM

## 2025-07-09 DIAGNOSIS — M81.0 OSTEOPOROSIS WITHOUT CURRENT PATHOLOGICAL FRACTURE, UNSPECIFIED OSTEOPOROSIS TYPE: ICD-10-CM

## 2025-07-09 DIAGNOSIS — E03.9 ACQUIRED HYPOTHYROIDISM: ICD-10-CM

## 2025-07-09 DIAGNOSIS — Z00.00 ROUTINE GENERAL MEDICAL EXAMINATION AT A HEALTH CARE FACILITY: Primary | ICD-10-CM

## 2025-07-09 PROCEDURE — 99396 PREV VISIT EST AGE 40-64: CPT | Performed by: NURSE PRACTITIONER

## 2025-07-09 RX ORDER — LEVOTHYROXINE SODIUM 100 UG/1
TABLET ORAL
Qty: 90 TABLET | Refills: 1 | Status: SHIPPED | OUTPATIENT
Start: 2025-07-09

## 2025-07-09 SDOH — ECONOMIC STABILITY: FOOD INSECURITY: WITHIN THE PAST 12 MONTHS, THE FOOD YOU BOUGHT JUST DIDN'T LAST AND YOU DIDN'T HAVE MONEY TO GET MORE.: NEVER TRUE

## 2025-07-09 SDOH — ECONOMIC STABILITY: FOOD INSECURITY: WITHIN THE PAST 12 MONTHS, YOU WORRIED THAT YOUR FOOD WOULD RUN OUT BEFORE YOU GOT MONEY TO BUY MORE.: NEVER TRUE

## 2025-07-09 ASSESSMENT — PATIENT HEALTH QUESTIONNAIRE - PHQ9
2. FEELING DOWN, DEPRESSED OR HOPELESS: NOT AT ALL
1. LITTLE INTEREST OR PLEASURE IN DOING THINGS: NOT AT ALL
SUM OF ALL RESPONSES TO PHQ QUESTIONS 1-9: 0
2. FEELING DOWN, DEPRESSED OR HOPELESS: NOT AT ALL
1. LITTLE INTEREST OR PLEASURE IN DOING THINGS: NOT AT ALL
SUM OF ALL RESPONSES TO PHQ QUESTIONS 1-9: 0
SUM OF ALL RESPONSES TO PHQ9 QUESTIONS 1 & 2: 0
SUM OF ALL RESPONSES TO PHQ QUESTIONS 1-9: 0
SUM OF ALL RESPONSES TO PHQ QUESTIONS 1-9: 0

## 2025-07-10 ASSESSMENT — ENCOUNTER SYMPTOMS
COLOR CHANGE: 0
BLOOD IN STOOL: 0
SINUS PRESSURE: 0
CHEST TIGHTNESS: 0
DIARRHEA: 0
SHORTNESS OF BREATH: 0
VOMITING: 0
CONSTIPATION: 0
RHINORRHEA: 0
EYE REDNESS: 0
NAUSEA: 0
EYE ITCHING: 0
WHEEZING: 0
ABDOMINAL PAIN: 0
BACK PAIN: 0
COUGH: 0
SORE THROAT: 0

## 2025-07-10 NOTE — PROGRESS NOTES
Linda Hays (:  1965) is a 59 y.o. female,Established patient, here for evaluation of the following chief complaint(s):  Annual Exam      ASSESSMENT/PLAN:  1. Routine general medical examination at a health care facility  2. Acquired hypothyroidism  -     levothyroxine (SYNTHROID) 100 MCG tablet; 1 tablet daily, Disp-90 tablet, R-1Requesting 1 year supplyNormal  3. ILD (interstitial lung disease) (HCC)  4. Senile osteoporosis  5. Psoriasis  6. Osteoporosis without current pathological fracture, unspecified osteoporosis type  7. Inflammatory arthritis  8. Hypothyroidism due to acquired atrophy of thyroid  9. Hyperlipidemia, unspecified hyperlipidemia type  10. Intrinsic eczema  11. Gastroesophageal reflux disease without esophagitis      Assessment & Plan  1. Hypothyroidism.  - TSH levels are marginally low, but T4 levels remain within the normal range.  - Vitals today: /88, HR 73.  - Current dosage of levothyroxine at 100 mcg will be maintained.    2. Rheumatoid Arthritis.  - Reports intermittent pain in various joints, including the knee, foot, and arm.  - Physical exam findings include pain radiating from the right shoulder.  - Advised to continue current treatment regimen and report any significant changes in symptoms.  - Referral for physical therapy provided; dry needling suggested as a potential treatment option.    3. Neck Pain.  - Experiencing neck pain radiating from the right shoulder, likely due to arthralgia.  - Physical exam findings include stiffness and pain in the neck.  - Discussed previous physical therapy and potential benefits of dry needling.  - Referral for physical therapy will be provided.    4. Cataracts.  - Diagnosed with early-stage cataracts, which are age-related.  - No immediate intervention required.  - Regular eye check-ups recommended.    5. Facial Bumps.  - Developed facial bumps, possibly due to a new retinol night cream.  - No signs of infection (e.g., no pus

## 2025-08-06 ENCOUNTER — TRANSCRIBE ORDERS (OUTPATIENT)
Dept: ADMINISTRATIVE | Age: 60
End: 2025-08-06

## 2025-08-06 DIAGNOSIS — R06.02 SOB (SHORTNESS OF BREATH): Primary | ICD-10-CM

## 2025-08-19 ENCOUNTER — HOSPITAL ENCOUNTER (OUTPATIENT)
Dept: PULMONOLOGY | Age: 60
Discharge: HOME | End: 2025-08-19
Attending: INTERNAL MEDICINE
Payer: COMMERCIAL

## 2025-08-19 DIAGNOSIS — R06.02 SOB (SHORTNESS OF BREATH): ICD-10-CM

## 2025-08-19 PROCEDURE — 94760 N-INVAS EAR/PLS OXIMETRY 1: CPT

## 2025-08-19 PROCEDURE — 94060 EVALUATION OF WHEEZING: CPT

## 2025-08-19 PROCEDURE — 94726 PLETHYSMOGRAPHY LUNG VOLUMES: CPT

## 2025-08-19 PROCEDURE — 94729 DIFFUSING CAPACITY: CPT

## 2025-08-19 PROCEDURE — 94664 DEMO&/EVAL PT USE INHALER: CPT

## 2025-08-19 PROCEDURE — 6360000002 HC RX W HCPCS: Performed by: INTERNAL MEDICINE

## 2025-08-19 RX ORDER — ALBUTEROL SULFATE 0.83 MG/ML
2.5 SOLUTION RESPIRATORY (INHALATION) ONCE
Status: COMPLETED | OUTPATIENT
Start: 2025-08-19 | End: 2025-08-19

## 2025-08-19 RX ADMIN — ALBUTEROL SULFATE 2.5 MG: 2.5 SOLUTION RESPIRATORY (INHALATION) at 16:45

## 2025-08-21 PROBLEM — R06.02 SOB (SHORTNESS OF BREATH): Status: ACTIVE | Noted: 2025-08-21

## 2025-08-21 LAB
DLCO %PRED: NORMAL
DLCO PRED: NORMAL
DLCO/VA %PRED: NORMAL
DLCO/VA PRED: NORMAL
DLCO/VA: NORMAL
DLCO: NORMAL
EXPIRATORY TIME-POST: NORMAL
EXPIRATORY TIME: NORMAL
FEF 25-75 %CHNG: NORMAL
FEF 25-75 POST %PRED: NORMAL
FEF 25-75% %PRED-PRE: NORMAL
FEF 25-75% PRED: NORMAL
FEF 25-75-POST: NORMAL
FEF 25-75-PRE: NORMAL
FEV1 %PRED-POST: NORMAL
FEV1 %PRED-PRE: NORMAL
FEV1 PRED: NORMAL
FEV1-POST: NORMAL
FEV1-PRE: NORMAL
FEV1/FVC %PRED-POST: NORMAL
FEV1/FVC %PRED-PRE: NORMAL
FEV1/FVC PRED: NORMAL
FEV1/FVC-POST: NORMAL
FEV1/FVC-PRE: NORMAL
FVC %PRED-POST: NORMAL
FVC %PRED-PRE: NORMAL
FVC PRED: NORMAL
FVC-POST: NORMAL
FVC-PRE: NORMAL
GAW %PRED: NORMAL
GAW PRED: NORMAL
GAW: NORMAL
IC PRE %PRED: NORMAL
IC PRED: NORMAL
IC: NORMAL
MEP: NORMAL
MIP: NORMAL
MVV %PRED-PRE: NORMAL
MVV PRED: NORMAL
MVV-PRE: NORMAL
PEF %PRED-POST: NORMAL
PEF %PRED-PRE: NORMAL
PEF PRED: NORMAL
PEF%CHNG: NORMAL
PEF-POST: NORMAL
PEF-PRE: NORMAL
RAW %PRED: NORMAL
RAW PRED: NORMAL
RAW: NORMAL
RV PRE %PRED: NORMAL
RV PRED: NORMAL
RV: NORMAL
SVC %PRED: NORMAL
SVC PRED: NORMAL
SVC: NORMAL
TLC PRE %PRED: NORMAL
TLC PRED: NORMAL
TLC: NORMAL
VA %PRED: NORMAL
VA PRED: NORMAL
VA: NORMAL
VTG %PRED: NORMAL
VTG PRED: NORMAL
VTG: NORMAL

## (undated) DEVICE — NEEDLE HYPO 25GA L1.5IN BLU POLYPR HUB S STL REG BVL STR

## (undated) DEVICE — MATERIAL PD W2XL4YD ST COT CAST SPLNT NONADHESIVE SPEC

## (undated) DEVICE — SYRINGE, LUER LOCK, 10ML: Brand: MEDLINE

## (undated) DEVICE — COMFO-TEX ELASTIC BANDAGE LATEX FREE, 3INX5YD: Brand: COMFO-TEX™

## (undated) DEVICE — ZIMMER® STERILE DISPOSABLE TOURNIQUET CUFF WITH PLC, DUAL PORT, SINGLE BLADDER, 18 IN. (46 CM)

## (undated) DEVICE — CHLORAPREP 26ML ORANGE

## (undated) DEVICE — SET GRAV VENT NVENT CK VLV 3 NDL FREE PRT 10 GTT

## (undated) DEVICE — GLOVE,SURG,SENSICARE,ALOE,LF,PF,7: Brand: MEDLINE

## (undated) DEVICE — SET ADMIN PRIMING 7ML L30IN 7.35LB 20 GTT 2ND RLER CLMP

## (undated) DEVICE — CORD ES L12FT BPLR FRCP

## (undated) DEVICE — Device

## (undated) DEVICE — 3M™ TEGADERM™ TRANSPARENT FILM DRESSING FRAME STYLE, 1624W, 2-3/8 IN X 2-3/4 IN (6 CM X 7 CM), 100/CT 4CT/CASE: Brand: 3M™ TEGADERM™

## (undated) DEVICE — PADDING CAST W4INXL4YD NONSTERILE COT RAYON MICROPLEATED

## (undated) DEVICE — SOLUTION IV 1000ML LAC RINGERS PH 6.5 INJ USP VIAFLX PLAS

## (undated) DEVICE — GLOVE SURG SZ 75 L12IN FNGR THK94MIL STD WHT LTX FREE

## (undated) DEVICE — STANDARD HYPODERMIC NEEDLE,POLYPROPYLENE HUB: Brand: MONOJECT

## (undated) DEVICE — CATHETER IV 20GA L1.25IN PNK FEP SFTY STR HUB RADPQ DISP

## (undated) DEVICE — SOLUTION IV IRRIG 500ML 0.9% SODIUM CHL 2F7123

## (undated) DEVICE — SUTURE ETHLN SZ 4-0 L18IN NONABSORBABLE BLK L19MM PS-2 3/8 1667H

## (undated) DEVICE — ABDOMINAL PAD: Brand: DERMACEA

## (undated) DEVICE — GOWN,SIRUS,NON REINFRCD,LARGE,SET IN SL: Brand: MEDLINE